# Patient Record
Sex: FEMALE | Race: BLACK OR AFRICAN AMERICAN | NOT HISPANIC OR LATINO | URBAN - METROPOLITAN AREA
[De-identification: names, ages, dates, MRNs, and addresses within clinical notes are randomized per-mention and may not be internally consistent; named-entity substitution may affect disease eponyms.]

---

## 2023-08-16 ENCOUNTER — EMERGENCY (EMERGENCY)
Facility: HOSPITAL | Age: 38
LOS: 0 days | Discharge: HOME | End: 2023-08-16
Attending: STUDENT IN AN ORGANIZED HEALTH CARE EDUCATION/TRAINING PROGRAM
Payer: COMMERCIAL

## 2023-08-16 VITALS
RESPIRATION RATE: 19 BRPM | SYSTOLIC BLOOD PRESSURE: 132 MMHG | DIASTOLIC BLOOD PRESSURE: 68 MMHG | HEART RATE: 55 BPM | TEMPERATURE: 98 F | OXYGEN SATURATION: 100 %

## 2023-08-16 VITALS
SYSTOLIC BLOOD PRESSURE: 136 MMHG | DIASTOLIC BLOOD PRESSURE: 76 MMHG | WEIGHT: 130.07 LBS | RESPIRATION RATE: 17 BRPM | OXYGEN SATURATION: 100 % | HEART RATE: 97 BPM | HEIGHT: 63 IN | TEMPERATURE: 100 F

## 2023-08-16 DIAGNOSIS — R10.9 UNSPECIFIED ABDOMINAL PAIN: ICD-10-CM

## 2023-08-16 DIAGNOSIS — F17.200 NICOTINE DEPENDENCE, UNSPECIFIED, UNCOMPLICATED: ICD-10-CM

## 2023-08-16 DIAGNOSIS — F32.A DEPRESSION, UNSPECIFIED: ICD-10-CM

## 2023-08-16 DIAGNOSIS — M54.9 DORSALGIA, UNSPECIFIED: ICD-10-CM

## 2023-08-16 DIAGNOSIS — R42 DIZZINESS AND GIDDINESS: ICD-10-CM

## 2023-08-16 DIAGNOSIS — R23.2 FLUSHING: ICD-10-CM

## 2023-08-16 DIAGNOSIS — R94.31 ABNORMAL ELECTROCARDIOGRAM [ECG] [EKG]: ICD-10-CM

## 2023-08-16 DIAGNOSIS — Z20.822 CONTACT WITH AND (SUSPECTED) EXPOSURE TO COVID-19: ICD-10-CM

## 2023-08-16 DIAGNOSIS — F29 UNSPECIFIED PSYCHOSIS NOT DUE TO A SUBSTANCE OR KNOWN PHYSIOLOGICAL CONDITION: ICD-10-CM

## 2023-08-16 LAB
ALBUMIN SERPL ELPH-MCNC: 3.5 G/DL — SIGNIFICANT CHANGE UP (ref 3.3–5)
ALP SERPL-CCNC: 57 U/L — SIGNIFICANT CHANGE UP (ref 40–120)
ALT FLD-CCNC: 25 U/L — SIGNIFICANT CHANGE UP (ref 12–78)
AMPHET UR-MCNC: NEGATIVE — SIGNIFICANT CHANGE UP
ANION GAP SERPL CALC-SCNC: 5 MMOL/L — SIGNIFICANT CHANGE UP (ref 5–17)
APAP SERPL-MCNC: <2 UG/ML — LOW (ref 10–30)
APPEARANCE UR: CLEAR — SIGNIFICANT CHANGE UP
AST SERPL-CCNC: 26 U/L — SIGNIFICANT CHANGE UP (ref 15–37)
BARBITURATES UR SCN-MCNC: NEGATIVE — SIGNIFICANT CHANGE UP
BASOPHILS # BLD AUTO: 0.05 K/UL — SIGNIFICANT CHANGE UP (ref 0–0.2)
BASOPHILS NFR BLD AUTO: 0.7 % — SIGNIFICANT CHANGE UP (ref 0–2)
BENZODIAZ UR-MCNC: NEGATIVE — SIGNIFICANT CHANGE UP
BILIRUB SERPL-MCNC: 0.5 MG/DL — SIGNIFICANT CHANGE UP (ref 0.2–1.2)
BILIRUB UR-MCNC: NEGATIVE — SIGNIFICANT CHANGE UP
BUN SERPL-MCNC: 7 MG/DL — SIGNIFICANT CHANGE UP (ref 7–23)
CALCIUM SERPL-MCNC: 8.3 MG/DL — LOW (ref 8.5–10.1)
CHLORIDE SERPL-SCNC: 109 MMOL/L — HIGH (ref 96–108)
CO2 SERPL-SCNC: 27 MMOL/L — SIGNIFICANT CHANGE UP (ref 22–31)
COCAINE METAB.OTHER UR-MCNC: NEGATIVE — SIGNIFICANT CHANGE UP
COLOR SPEC: YELLOW — SIGNIFICANT CHANGE UP
CREAT SERPL-MCNC: 0.71 MG/DL — SIGNIFICANT CHANGE UP (ref 0.5–1.3)
DIFF PNL FLD: NEGATIVE — SIGNIFICANT CHANGE UP
EGFR: 110 ML/MIN/1.73M2 — SIGNIFICANT CHANGE UP
EOSINOPHIL # BLD AUTO: 0.06 K/UL — SIGNIFICANT CHANGE UP (ref 0–0.5)
EOSINOPHIL NFR BLD AUTO: 0.9 % — SIGNIFICANT CHANGE UP (ref 0–6)
ETHANOL SERPL-MCNC: <10 MG/DL — SIGNIFICANT CHANGE UP (ref 0–10)
FLUAV AG NPH QL: SIGNIFICANT CHANGE UP
FLUBV AG NPH QL: SIGNIFICANT CHANGE UP
GLUCOSE SERPL-MCNC: 84 MG/DL — SIGNIFICANT CHANGE UP (ref 70–99)
GLUCOSE UR QL: NEGATIVE MG/DL — SIGNIFICANT CHANGE UP
HCG SERPL-ACNC: <1 MIU/ML — SIGNIFICANT CHANGE UP
HCT VFR BLD CALC: 27.6 % — LOW (ref 34.5–45)
HGB BLD-MCNC: 7.8 G/DL — LOW (ref 11.5–15.5)
IMM GRANULOCYTES NFR BLD AUTO: 0.3 % — SIGNIFICANT CHANGE UP (ref 0–0.9)
KETONES UR-MCNC: NEGATIVE — SIGNIFICANT CHANGE UP
LEUKOCYTE ESTERASE UR-ACNC: NEGATIVE — SIGNIFICANT CHANGE UP
LYMPHOCYTES # BLD AUTO: 1.64 K/UL — SIGNIFICANT CHANGE UP (ref 1–3.3)
LYMPHOCYTES # BLD AUTO: 23.9 % — SIGNIFICANT CHANGE UP (ref 13–44)
MCHC RBC-ENTMCNC: 17.2 PG — LOW (ref 27–34)
MCHC RBC-ENTMCNC: 28.3 G/DL — LOW (ref 32–36)
MCV RBC AUTO: 60.9 FL — LOW (ref 80–100)
METHADONE UR-MCNC: NEGATIVE — SIGNIFICANT CHANGE UP
MONOCYTES # BLD AUTO: 0.56 K/UL — SIGNIFICANT CHANGE UP (ref 0–0.9)
MONOCYTES NFR BLD AUTO: 8.2 % — SIGNIFICANT CHANGE UP (ref 2–14)
NEUTROPHILS # BLD AUTO: 4.52 K/UL — SIGNIFICANT CHANGE UP (ref 1.8–7.4)
NEUTROPHILS NFR BLD AUTO: 66 % — SIGNIFICANT CHANGE UP (ref 43–77)
NITRITE UR-MCNC: NEGATIVE — SIGNIFICANT CHANGE UP
NRBC # BLD: 0 /100 WBCS — SIGNIFICANT CHANGE UP (ref 0–0)
OPIATES UR-MCNC: NEGATIVE — SIGNIFICANT CHANGE UP
PCP SPEC-MCNC: SIGNIFICANT CHANGE UP
PCP UR-MCNC: NEGATIVE — SIGNIFICANT CHANGE UP
PH UR: 6 — SIGNIFICANT CHANGE UP (ref 5–8)
PLATELET # BLD AUTO: 242 K/UL — SIGNIFICANT CHANGE UP (ref 150–400)
POTASSIUM SERPL-MCNC: 3.8 MMOL/L — SIGNIFICANT CHANGE UP (ref 3.5–5.3)
POTASSIUM SERPL-SCNC: 3.8 MMOL/L — SIGNIFICANT CHANGE UP (ref 3.5–5.3)
PROT SERPL-MCNC: 8 GM/DL — SIGNIFICANT CHANGE UP (ref 6–8.3)
PROT UR-MCNC: NEGATIVE MG/DL — SIGNIFICANT CHANGE UP
RBC # BLD: 4.53 M/UL — SIGNIFICANT CHANGE UP (ref 3.8–5.2)
RBC # FLD: 21.1 % — HIGH (ref 10.3–14.5)
SALICYLATES SERPL-MCNC: <1.7 MG/DL — LOW (ref 2.8–20)
SARS-COV-2 RNA SPEC QL NAA+PROBE: SIGNIFICANT CHANGE UP
SODIUM SERPL-SCNC: 141 MMOL/L — SIGNIFICANT CHANGE UP (ref 135–145)
SP GR SPEC: 1.01 — SIGNIFICANT CHANGE UP (ref 1.01–1.02)
THC UR QL: NEGATIVE — SIGNIFICANT CHANGE UP
TSH SERPL-MCNC: 0.02 UIU/ML — LOW (ref 0.36–3.74)
UROBILINOGEN FLD QL: NEGATIVE MG/DL — SIGNIFICANT CHANGE UP
WBC # BLD: 6.85 K/UL — SIGNIFICANT CHANGE UP (ref 3.8–10.5)
WBC # FLD AUTO: 6.85 K/UL — SIGNIFICANT CHANGE UP (ref 3.8–10.5)

## 2023-08-16 PROCEDURE — 99285 EMERGENCY DEPT VISIT HI MDM: CPT

## 2023-08-16 PROCEDURE — 93010 ELECTROCARDIOGRAM REPORT: CPT

## 2023-08-16 PROCEDURE — 90792 PSYCH DIAG EVAL W/MED SRVCS: CPT | Mod: 95,GC

## 2023-08-16 NOTE — ED ADULT NURSE NOTE - NSHOSCREENINGQ1_ED_ALL_ED
[FreeTextEntry1] : Patient is a 28 year old female who presents with sinus pressure, sinus pain, nasal congestion, and foul odor in her left nare. Physical examination wnl, except green PND is seen \par Nasal endoscopy significant for left middle turbinate erythema, no other abnormalities but clinical history is highly consistent with chronic sinusitis, possible allergic fungal. \par \par Plan\par - rx for CT no contrast-- will call with results\par - d/c Ciprofloxacin\par - c/w [facility] sinus rinse with only saline\par - f/u TBD on CT scan results, possible abx/steroids etc vs surgery  No

## 2023-08-16 NOTE — ED PROVIDER NOTE - OBJECTIVE STATEMENT
38F w/o significant PMH presents for lightheadedness & emotional distress. Pt is in police custody. Pt tearful. Pt endorses occasional flushed sensation at night, occasional abd pain, occasional back pain. Denies F/C, h/a, vision change, CP, SOB, cough, N/V/D, UTI sx. Pt denies SI or HI, but state she feels very depressed, pt states she will often have sex to make herself feel less alone, pt states she is w/o friends or family, & has no place to live. Denies HI. Pt denies AH / VH, but states she will often stare at things that fascinate her.     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

## 2023-08-16 NOTE — ED PROVIDER NOTE - NSFOLLOWUPINSTRUCTIONS_ED_ALL_ED_FT
Rest, drink plenty of fluids  Advance activity as tolerated  Continue all previously prescribed medications as directed  Follow up with your PMD - bring copies of your results  Return to the ER for thoughts of self harm or harming others, hallucinations, chest pain, difficulty breathing, or other new or concerning symptoms   Follow up with your doctor for abnormal red blood cells and thyroid function

## 2023-08-16 NOTE — ED BEHAVIORAL HEALTH ASSESSMENT NOTE - LEGAL HISTORY
States has been in custodial once prior for "throwing away clothes" States has been in nursing home once prior for "throwing away clothes", currently under arrest and has open MD charge for [place] Mis:intent Damage Proprty per Lesleys

## 2023-08-16 NOTE — ED BEHAVIORAL HEALTH ASSESSMENT NOTE - NSBHMSEINSIGHT_PSY_A_CORE
Bactrim Counseling:  I discussed with the patient the risks of sulfa antibiotics including but not limited to GI upset, allergic reaction, drug rash, diarrhea, dizziness, photosensitivity, and yeast infections.  Rarely, more serious reactions can occur including but not limited to aplastic anemia, agranulocytosis, methemoglobinemia, blood dyscrasias, liver or kidney failure, lung infiltrates or desquamative/blistering drug rashes. Poor Fair

## 2023-08-16 NOTE — ED BEHAVIORAL HEALTH ASSESSMENT NOTE - NSCURPASTPSYDX_PSY_ALL_CORE
Psychotic disorder/Alcohol/Substance Use disorders mild paranoia/Mood disorder/Cluster B Personality disorder/traits

## 2023-08-16 NOTE — ED ADULT NURSE NOTE - NSFALLUNIVINTERV_ED_ALL_ED
Bed/Stretcher in lowest position, wheels locked, appropriate side rails in place/Call bell, personal items and telephone in reach/Instruct patient to call for assistance before getting out of bed/chair/stretcher/Non-slip footwear applied when patient is off stretcher/[place] to call system/Physically safe environment - no spills, clutter or unnecessary equipment/Purposeful proactive rounding/Room/bathroom lighting operational, light cord in reach

## 2023-08-16 NOTE — ED BEHAVIORAL HEALTH ASSESSMENT NOTE - DETAILS
unknown if true States recently at [facility] ED Aware see hpi Patient denies Unavailable No SI referent aware

## 2023-08-16 NOTE — ED ADULT TRIAGE NOTE - CHIEF COMPLAINT QUOTE
Patient under Police custody, came with a shackle an hand cuffs, came here for medical and psyche eval. patient reports she's feeling lightheadedness and also she needs emotional help.  denies any SI/HI

## 2023-08-16 NOTE — ED BEHAVIORAL HEALTH ASSESSMENT NOTE - SUMMARY
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    On eval patient calm, cooperative, linear but concrete TP, vague historian but TC + for possible paranoia, not appearing overtly intoxication, no IP/RIS. Patient stating she does not know why she is under arrest, denies any recent violence, any access to firearm. Denying AVH, any SI/HI. States she has chronic history of anxiety, sense of being followed in community and being in danger. States past psych care at [facility], but no MD or meds in many months. Vague about why she has been hospitalized in the past. At this time, DDx primary psychotic do, less likely substance induced psychosis given negative Utox. Patient denying SI/HI, denying AVH, with demonstrated ability to care for self at minimum level in community, not meeting criteria for involuntary psychiatric admission at this time. Will d/c to [facility] police custody, will provide referrals for local shelters and outpatient psychiatric resources. 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    On evaluation patient is not acutely manic, depressed or psychotic. She has some paranoid ideation, but she is not preoccupied by these thoughts. She reported she has some anxiety, and was evasive at times, but she impressed as psychiatrically stable. While in the ED she was calm without evidence of aggression or self harm behavior. Her thought process was largely linear. She denied history of suicide attempts. She denied suicidal ideation, intent, and plan. She denied homicidal ideation, intent, and plan and she denied access to weapons. She denies auditory/visual hallucinations and she was not responsive to internal stimuli. Pt would benefit from therapy and outpatient psychiatric treatment given reports of anxiety, history of psychiatric illness and psychosocial stressors; however she is not an acute risk of harm to self or others due to psychiatric illness and she does not meet legal criteria for involuntary hospitalization on a psychiatric unit at this time. Pt is agreeable to receive outpatient resources as she expressed that she wants to reestablish care at [facility]. Resources were provided to patient for shelter, [facility] crisis center, and [facility] outpatient services.

## 2023-08-16 NOTE — ED PROVIDER NOTE - CLINICAL SUMMARY MEDICAL DECISION MAKING FREE TEXT BOX
38F BIB police d/t lightheadedness, request for Psych evaluation. Afebrile, VSS. Pt tearful, but in NAD. Unremarkable PE. Pt w/o family or friend for collateral. Plan for medical clearance, psych evaluation. 38F BIB police d/t lightheadedness, request for Psych evaluation. Afebrile, VSS. Pt tearful, but in NAD. Unremarkable PE. Pt w/o family or friend for collateral. Plan for medical clearance, psych evaluation. Pt care signed out at change of shift, pending Psych recommendations.

## 2023-08-16 NOTE — ED BEHAVIORAL HEALTH ASSESSMENT NOTE - HPI (INCLUDE ILLNESS QUALITY, SEVERITY, DURATION, TIMING, CONTEXT, MODIFYING FACTORS, ASSOCIATED SIGNS AND SYMPTOMS)
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    On eval, patient calm, cooperative in NAD, linear but concrete TP, not appearing intoxicated or IP/RIS, vague historian. Stating she does not known why she is under arrest, that she was in her house when police came in, does not know who called 911. Denies having access to firearm. States she has been "anxious" in past weeks, mainly about sensation about people following her. Vague when asked who these people are, states she "knows their names" but won't say who they are, does not known why they are following her "I just want to be left alone". Denies trying to find and harm or kill them. Denies any SI. Denies AVH or manic sx. States she "used to drink", but states recently 1-2 beers infrequently, denies other substance use. States she has not seen psychiatrist "in awhile", does not know names of medications she used to take. States past care at [facility] "upstairs and in the clinic", that she knows how to access services there. Vague when asked why she has been in the hospital in the past, just referencing her anxiety. Requesting medication for anxiety and insomnia. States she is currently "between places" and receives food stamps, also panhandles for money. States she has family in area, but unable to provide any numbers for collateral. 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    Pt seen and chart reviewed. On evaluation, patient calm, cooperative in NAD, linear but concrete TP. She is not responsive to internal stimuli.  She is cooperative, but she is a vague historian. Stating she does not known why she is under arrest, that she was in someone's house getting her stuff when police came in, but she does not know who called 911. She denies having access to firearm. States she has been "anxious" in past weeks, mainly because she feels people are  following her. Vague when asked who these people are, states she "knows their names" but won't say who they are, does not known why they are following her "I just want to be left alone". Denies homicidal, violent, ideation intent or plan. She denies history of suicide attempts. She denies suicidal ideation, intent, and plan.  Denies AVH or manic sx. States she "used to drink", but states recently 1-2 beers infrequently, denies other substance use. States she has not seen psychiatrist "in awhile", does not know names of medications she used to take. States past care at [facility] "upstairs and in the clinic", that she knows how to access services there. Vague when asked why she has been in the hospital in the past, just referencing her anxiety. She stated that she was agreeable to restart outpatient treatment for medication for sleep and anxiety. Regarding where she lives, she states she is currently "between places" and receives food stamps, also panhandles for money. States she has family in area, but unable to provide any numbers for collateral.

## 2023-08-16 NOTE — ED ADULT NURSE NOTE - OBJECTIVE STATEMENT
Patient under Police custody,  came here for medical and psyche eval. patient reports she's feeling lightheadedness and also she needs emotional help, stating I need a new man and needs to have sex before she dies and feels as though she is dying every day. As per officer pt noted passing out while in custody and acting strange. Pt poor historian stating that sometimes she has allergies to meds and sometimes I don't. 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 denies any SI/HI

## 2023-08-16 NOTE — ED BEHAVIORAL HEALTH ASSESSMENT NOTE - OTHER PAST PSYCHIATRIC HISTORY (INCLUDE DETAILS REGARDING ONSET, COURSE OF ILLNESS, INPATIENT/OUTPATIENT TREATMENT)
Patient vague regarding PPH. Numerous diagnoses per psyckes (Unspecifed/Other Psychotic Disorders | Adjustment Disorder | Schizophrenia | PTSD | Unspecifed/Other Depressive Disorder | Delusional   Disorder | Other Mental Disorders). Per psyckes prescribed paliperidone/clonazepam >1yr ago. Numerous 1 day CPEP, last hospitalization listed for 6 days 3/2022 at [facility].

## 2023-08-16 NOTE — ED BEHAVIORAL HEALTH ASSESSMENT NOTE - RISK ASSESSMENT
Patient denying SI/HI, denying AVH, with demonstrated ability to care for self at minimum level in community, not meeting criteria for involuntary psychiatric admission at this time. Will d/c to [facility] police custody, will provide referrals for local shelters and outpatient psychiatric resources. Pt is at low imminent risk of harm to self or others at this time. She has chronic and some acute risk factors including past IPP, arrests, mild paranoia, currently being under arrest, history of psychiatric illness, not currently in treatment, reporting anxiety. However, patient has many protective factors that mitigate her risk factors. She is help seeking, she denies history of suicide attempts, she denies SI/HI, she is not acutely manic, depressed, or psychotic, she is caring for her ADLS, she is future oriented, she expressed interest in outpatient treatment, and she is currently in police custody which also decreases her risk of imminent violence/self harm as she is in a supervised setting.

## 2023-08-16 NOTE — ED PROVIDER NOTE - IV ALTEPLASE EXCL REL HIDDEN
Seen and examined agree with the above resident note, dxed with resident in details. See additional info as it applies.    Recently seen in the hospital presented to establish outpatient care  She is a group D unknown grade COPD we don't have PFTs on file  Diagnosed about 10 years ago   tobacco user quit right before her hospitalization about 20 days ago    She has close to normal CAT scan of the chest last week, normal coronary angiography, normal 2-D echocardiography with no signs of pulmonary hypertension  She is on triple inhalation therapy and doing well  She did not need supplemental oxygen    Continue inhalation management  Enroll in pulmonary rehabilitation  She has several sleep symptoms from insomnia to excessive daytime sleepiness to snoring, we would proceed with a comprehensive polysomnography  Need baseline pulmonary function study  Smoking counseling  Up-to-date on vaccination  See me after the testing  
show

## 2023-08-16 NOTE — ED BEHAVIORAL HEALTH ASSESSMENT NOTE - DIFFERENTIAL
Primary psychotic do, less likely substance induced psychosis axis 2 pathology, generalized anxiety disorder, primary psychotic disorder, adjustment disorder, primary mood disorder

## 2023-08-16 NOTE — ED BEHAVIORAL HEALTH NOTE - BEHAVIORAL HEALTH NOTE
===================     PRE-HOSPITAL COURSE     ===================     SOURCE: Secondhand documentation and ED RN Gail     DETAILS: Pt BIBEMS under police custody with chief complaint of lightheadedness and depression, seeking psychiatric evaluation.    ============     ED COURSE     ============     SOURCE:  Secondhand documentation and ED RN Gail    ARRIVAL MODE: ED RN reports that pt has been cooperative with all triage processes. Per ED RN, pt's hygiene and grooming are fair.     BELONGINGS: Nothing of note was discovered.     BEHAVIOR: ED RN newly had the pt and did not have too much to report but notes that pt has been calm and cooperative and has remained under behavioral and impulse control. Per ED RN, pt demonstrates a depressed mood with a tearful affect. Pt is currently denying SI/HI but endorses high-risk sexual behavior. Pt arrived in police custody but ED RN has not learned more about the reason for her arrest at this time.     TREATMENT: Pt has not required treatment thus far.     VISITORS: No visitors at bedside.

## 2023-08-16 NOTE — ED BEHAVIORAL HEALTH ASSESSMENT NOTE - REFERRAL / APPOINTMENT DETAILS
Provide outpatient information for clinic at [facility] Pt provided with resources for outpatient tx at [facility], shelter information, and crisis clinic information

## 2023-08-16 NOTE — ED PROVIDER NOTE - CARE PROVIDER_API CALL
Grayson Neville  Internal Medicine  [address] [place] [id number]  Phone: [phone]  Fax: [phone]  Follow Up Time:

## 2023-08-16 NOTE — ED BEHAVIORAL HEALTH ASSESSMENT NOTE - NSBHATTESTBILLING_PSY_A_CORE
99222-Initial OBS or IP - moderate complexity OR 55-74 mins 10532-[id number] diagnostic evaluation with medical services

## 2023-08-16 NOTE — ED BEHAVIORAL HEALTH ASSESSMENT NOTE - DESCRIPTION
On initial assessment HY 97, Temp 99.7, HgB 7.8. TSH .023, Utox wnl. 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 Patient cooperative in ED, no PRN needed for behavior. 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 States she takes ASA QD. States currently undomiciled, receives foodstamps Pt under arrest though calm and in good behavioral control     T(C): 36.9 (08-16-23 @ 23:49)  T(F): 98.4 (08-16-23 @ 23:49), Max: 99.7 (08-16-23 @ 13:27)  HR: 55 (08-16-23 @ 23:49) (55 - 97)  BP: 132/68 (08-16-23 @ 23:49) (124/69 - 145/74)  RR:  (16 - 19)  SpO2:  (97% - 100%)  Wt(kg): -- States currently unmichele receives SNAP benefits

## 2023-08-16 NOTE — ED ADULT NURSE REASSESSMENT NOTE - NS ED NURSE REASSESS COMMENT FT1
Pt resting in bed, PD still at bedside. PD restraints are in place, sites assessed. Pt has palpable pulses, full sensation in extremities. Telepsych consult completed, awaiting further orders for dispo. Plan of care ongoing.

## 2023-08-16 NOTE — ED PROVIDER NOTE - PROGRESS NOTE DETAILS
Pt presented to Tele Psych. Tucker: Cleared by telepsych.  Informed patient of anemia and abnormal thyroid studies.  No bleeding now.  Well appearing now.  Instructed to follow up with pmd on release.

## 2023-08-16 NOTE — ED PROVIDER NOTE - PATIENT PORTAL LINK FT
You can access the FollowMyHealth Patient Portal offered by [facility] by registering at the following website: [email or web address]. By joining [facility]’s FollowMyHealth portal, you will also be able to view your health information using other applications (apps) compatible with our system.

## 2023-08-16 NOTE — ED PROVIDER NOTE - PHYSICAL EXAMINATION
GEN: Awake, alert, interactive, tearful / anxious.   HEAD AND NECK: NC/AT. Airway patent. Neck supple.   EYES:  Clear b/l. EOMI. PERRL.   ENT: Moist mucus membranes.   CARDIAC: Regular rate, regular rhythm. No evident pedal edema.    RESP/CHEST: Normal respiratory effort with no use of accessory muscles or retractions. Clear throughout on auscultation.  ABD: Soft, non-distended, non-tender. No rebound, no guarding.   BACK: No midline spinal TTP. No CVAT.   EXTREMITIES: Moving all extremities with no apparent deformities.   SKIN: Warm, dry, intact normal color. No rash.   NEURO: AOx3, CN II-XII grossly intact, no focal deficits.   PSYCH: Tearful, anxious. Pt w/ tangential replies to questions.

## 2023-08-17 DIAGNOSIS — F43.20 ADJUSTMENT DISORDER, UNSPECIFIED: ICD-10-CM

## 2023-08-17 LAB
T3 SERPL-MCNC: 152 NG/DL — SIGNIFICANT CHANGE UP (ref 80–200)
T4 AB SER-ACNC: 9.2 UG/DL — SIGNIFICANT CHANGE UP (ref 4.6–12)
T4 FREE SERPL-MCNC: 1.9 NG/DL — HIGH (ref 0.9–1.8)

## 2024-01-31 NOTE — ED BEHAVIORAL HEALTH ASSESSMENT NOTE - NS ED BHA SUICIDALITY PRESENT CURRENT PASSIVE IDEATION
----- Message from Isatu Ceja sent at 1/31/2024  2:37 PM CST -----  Contact: KEON URIARTE [[id number]]  ..Type:  Patient Requesting Call    Who Called: KEON URIARTE [[id number]]  Does the patient know what this is regarding?: questions regarding A1C retest  Would the patient rather a call back or a response via [facility]? call  Best Call Back Number: .[phone] (home)   Additional Information:            No

## 2024-04-03 NOTE — ED ADULT NURSE NOTE - AS PAIN REST
[FreeTextEntry8] : New patient Acute care visit Has 6-8 bug bites over body after working in the backyard.  Areas are itchy, red, flat. She did not see any ticks. Her bedroom is connected to a greenhouse and she has house cats.  Denies fever, chills, body aches, rash 0 (no pain/absence of nonverbal indicators of pain)

## 2024-07-02 ENCOUNTER — INPATIENT (INPATIENT)
Facility: HOSPITAL | Age: 39
LOS: 9 days | Discharge: PSYCHIATRIC OR SUBSTANCE USE FACILITY | End: 2024-07-12
Attending: HOSPITALIST | Admitting: HOSPITALIST
Payer: MEDICAID

## 2024-07-02 VITALS
WEIGHT: 177.91 LBS | TEMPERATURE: 98 F | RESPIRATION RATE: 16 BRPM | SYSTOLIC BLOOD PRESSURE: 107 MMHG | HEART RATE: 70 BPM | DIASTOLIC BLOOD PRESSURE: 61 MMHG | HEIGHT: 65 IN | OXYGEN SATURATION: 100 %

## 2024-07-02 DIAGNOSIS — F43.10 POST-TRAUMATIC STRESS DISORDER, UNSPECIFIED: ICD-10-CM

## 2024-07-02 DIAGNOSIS — F32.A DEPRESSION, UNSPECIFIED: ICD-10-CM

## 2024-07-02 DIAGNOSIS — Z79.899 OTHER LONG TERM (CURRENT) DRUG THERAPY: ICD-10-CM

## 2024-07-02 DIAGNOSIS — R79.89 OTHER SPECIFIED ABNORMAL FINDINGS OF BLOOD CHEMISTRY: ICD-10-CM

## 2024-07-02 LAB
ADD ON TEST-SPECIMEN IN LAB: SIGNIFICANT CHANGE UP
ALBUMIN SERPL ELPH-MCNC: 3.7 G/DL — SIGNIFICANT CHANGE UP (ref 3.3–5)
ALP SERPL-CCNC: 91 U/L — SIGNIFICANT CHANGE UP (ref 40–120)
ALT FLD-CCNC: 22 U/L — SIGNIFICANT CHANGE UP (ref 4–33)
ANION GAP SERPL CALC-SCNC: 12 MMOL/L — SIGNIFICANT CHANGE UP (ref 7–14)
ANISOCYTOSIS BLD QL: SIGNIFICANT CHANGE UP
APAP SERPL-MCNC: <10 UG/ML — LOW (ref 15–25)
AST SERPL-CCNC: 30 U/L — SIGNIFICANT CHANGE UP (ref 4–32)
BASOPHILS # BLD AUTO: 0 K/UL — SIGNIFICANT CHANGE UP (ref 0–0.2)
BASOPHILS NFR BLD AUTO: 0 % — SIGNIFICANT CHANGE UP (ref 0–2)
BILIRUB SERPL-MCNC: 0.6 MG/DL — SIGNIFICANT CHANGE UP (ref 0.2–1.2)
BUN SERPL-MCNC: 7 MG/DL — SIGNIFICANT CHANGE UP (ref 7–23)
CALCIUM SERPL-MCNC: 8.9 MG/DL — SIGNIFICANT CHANGE UP (ref 8.4–10.5)
CHLORIDE SERPL-SCNC: 104 MMOL/L — SIGNIFICANT CHANGE UP (ref 98–107)
CO2 SERPL-SCNC: 19 MMOL/L — LOW (ref 22–31)
CREAT SERPL-MCNC: 0.7 MG/DL — SIGNIFICANT CHANGE UP (ref 0.5–1.3)
DACRYOCYTES BLD QL SMEAR: SLIGHT — SIGNIFICANT CHANGE UP
EGFR: 111 ML/MIN/1.73M2 — SIGNIFICANT CHANGE UP
EOSINOPHIL # BLD AUTO: 0 K/UL — SIGNIFICANT CHANGE UP (ref 0–0.5)
EOSINOPHIL NFR BLD AUTO: 0 % — SIGNIFICANT CHANGE UP (ref 0–6)
ETHANOL SERPL-MCNC: <10 MG/DL — SIGNIFICANT CHANGE UP
GIANT PLATELETS BLD QL SMEAR: PRESENT — SIGNIFICANT CHANGE UP
GLUCOSE SERPL-MCNC: 89 MG/DL — SIGNIFICANT CHANGE UP (ref 70–99)
HCG SERPL-ACNC: <1 MIU/ML — SIGNIFICANT CHANGE UP
HCT VFR BLD CALC: 30 % — LOW (ref 34.5–45)
HGB BLD-MCNC: 8.6 G/DL — LOW (ref 11.5–15.5)
HYPOCHROMIA BLD QL: SIGNIFICANT CHANGE UP
IANC: 7.12 K/UL — SIGNIFICANT CHANGE UP (ref 1.8–7.4)
LYMPHOCYTES # BLD AUTO: 0.82 K/UL — LOW (ref 1–3.3)
LYMPHOCYTES # BLD AUTO: 8.8 % — LOW (ref 13–44)
MANUAL SMEAR VERIFICATION: SIGNIFICANT CHANGE UP
MCHC RBC-ENTMCNC: 16.7 PG — LOW (ref 27–34)
MCHC RBC-ENTMCNC: 28.7 GM/DL — LOW (ref 32–36)
MCV RBC AUTO: 58.1 FL — LOW (ref 80–100)
MICROCYTES BLD QL: SLIGHT — SIGNIFICANT CHANGE UP
MONOCYTES # BLD AUTO: 0.33 K/UL — SIGNIFICANT CHANGE UP (ref 0–0.9)
MONOCYTES NFR BLD AUTO: 3.5 % — SIGNIFICANT CHANGE UP (ref 2–14)
NEUTROPHILS # BLD AUTO: 7.87 K/UL — HIGH (ref 1.8–7.4)
NEUTROPHILS NFR BLD AUTO: 84.2 % — HIGH (ref 43–77)
OVALOCYTES BLD QL SMEAR: SLIGHT — SIGNIFICANT CHANGE UP
PLAT MORPH BLD: NORMAL — SIGNIFICANT CHANGE UP
PLATELET # BLD AUTO: 186 K/UL — SIGNIFICANT CHANGE UP (ref 150–400)
PLATELET COUNT - ESTIMATE: NORMAL — SIGNIFICANT CHANGE UP
POIKILOCYTOSIS BLD QL AUTO: SLIGHT — SIGNIFICANT CHANGE UP
POLYCHROMASIA BLD QL SMEAR: SLIGHT — SIGNIFICANT CHANGE UP
POTASSIUM SERPL-MCNC: 4 MMOL/L — SIGNIFICANT CHANGE UP (ref 3.5–5.3)
POTASSIUM SERPL-SCNC: 4 MMOL/L — SIGNIFICANT CHANGE UP (ref 3.5–5.3)
PROT SERPL-MCNC: 7.5 G/DL — SIGNIFICANT CHANGE UP (ref 6–8.3)
RBC # BLD: 5.16 M/UL — SIGNIFICANT CHANGE UP (ref 3.8–5.2)
RBC # FLD: 22.4 % — HIGH (ref 10.3–14.5)
RBC BLD AUTO: ABNORMAL
SALICYLATES SERPL-MCNC: <0.3 MG/DL — LOW (ref 15–30)
SARS-COV-2 RNA SPEC QL NAA+PROBE: SIGNIFICANT CHANGE UP
SMUDGE CELLS # BLD: PRESENT — SIGNIFICANT CHANGE UP
SODIUM SERPL-SCNC: 135 MMOL/L — SIGNIFICANT CHANGE UP (ref 135–145)
TOXICOLOGY SCREEN, DRUGS OF ABUSE, SERUM RESULT: SIGNIFICANT CHANGE UP
TSH SERPL-MCNC: <0.1 UIU/ML — LOW (ref 0.27–4.2)
VARIANT LYMPHS # BLD: 3.5 % — SIGNIFICANT CHANGE UP (ref 0–6)
WBC # BLD: 9.35 K/UL — SIGNIFICANT CHANGE UP (ref 3.8–10.5)
WBC # FLD AUTO: 9.35 K/UL — SIGNIFICANT CHANGE UP (ref 3.8–10.5)

## 2024-07-02 RX ORDER — NICOTINE POLACRILEX 2 MG/1
1 LOZENGE ORAL DAILY
Refills: 0 | Status: DISCONTINUED | OUTPATIENT
Start: 2024-07-02 | End: 2024-07-12

## 2024-07-02 RX ORDER — RISPERIDONE 0.5 MG/1
1 TABLET ORAL ONCE
Refills: 0 | Status: COMPLETED | OUTPATIENT
Start: 2024-07-02 | End: 2024-07-02

## 2024-07-02 RX ORDER — ACETAMINOPHEN 325 MG
650 TABLET ORAL EVERY 6 HOURS
Refills: 0 | Status: DISCONTINUED | OUTPATIENT
Start: 2024-07-02 | End: 2024-07-12

## 2024-07-02 RX ORDER — ONDANSETRON HYDROCHLORIDE 2 MG/ML
4 INJECTION INTRAMUSCULAR; INTRAVENOUS EVERY 8 HOURS
Refills: 0 | Status: DISCONTINUED | OUTPATIENT
Start: 2024-07-02 | End: 2024-07-12

## 2024-07-02 RX ORDER — ACETAMINOPHEN 325 MG
650 TABLET ORAL ONCE
Refills: 0 | Status: COMPLETED | OUTPATIENT
Start: 2024-07-02 | End: 2024-07-02

## 2024-07-02 RX ORDER — ASPIRIN 325 MG/1
1 TABLET, FILM COATED ORAL
Refills: 0 | DISCHARGE

## 2024-07-02 RX ORDER — MAGNESIUM, ALUMINUM HYDROXIDE 400-400
30 TABLET,CHEWABLE ORAL EVERY 4 HOURS
Refills: 0 | Status: DISCONTINUED | OUTPATIENT
Start: 2024-07-02 | End: 2024-07-12

## 2024-07-02 RX ORDER — ASPIRIN 325 MG/1
81 TABLET, FILM COATED ORAL DAILY
Refills: 0 | Status: DISCONTINUED | OUTPATIENT
Start: 2024-07-02 | End: 2024-07-12

## 2024-07-02 RX ORDER — ENOXAPARIN SODIUM 100 MG/ML
40 INJECTION SUBCUTANEOUS EVERY 24 HOURS
Refills: 0 | Status: DISCONTINUED | OUTPATIENT
Start: 2024-07-03 | End: 2024-07-12

## 2024-07-02 RX ADMIN — RISPERIDONE 1 MILLIGRAM(S): 0.5 TABLET ORAL at 07:25

## 2024-07-02 RX ADMIN — NICOTINE POLACRILEX 1 PATCH: 2 LOZENGE ORAL at 23:08
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
## 2024-07-02 NOTE — PATIENT PROFILE ADULT - FALL HARM RISK - UNIVERSAL INTERVENTIONS
Bed in lowest position, wheels locked, appropriate side rails in place/Call bell, personal items and telephone in reach/Instruct patient to call for assistance before getting out of bed or chair/Non-slip footwear when patient is out of bed/[place] to call system/Physically safe environment - no spills, clutter or unnecessary equipment/Purposeful Proactive Rounding/Room/bathroom lighting operational, light cord in reach

## 2024-07-02 NOTE — ED BEHAVIORAL HEALTH ASSESSMENT NOTE - DESCRIPTION
n/a lives with 2 kids, currently  but does not live with , Calm, cooperative, tired and falling asleep occasionally but arousable. Given Risperdal 1mg po qhs for voices and bc she was stable on invega from 2022.   Vital Signs Last 24 Hrs  T(C): 36.6 (02 Jul 2024 04:33), Max: 36.6 (02 Jul 2024 04:33)  T(F): 97.9 (02 Jul 2024 04:33), Max: 97.9 (02 Jul 2024 04:33)  HR: 70 (02 Jul 2024 04:33) (70 - 70)  BP: 107/61 (02 Jul 2024 04:33) (107/61 - 107/61)  BP(mean): --  RR: 16 (02 Jul 2024 04:33) (16 - 16)  SpO2: 100% (02 Jul 2024 04:33) (100% - 100%)    Parameters below as of 02 Jul 2024 04:33  Patient On (Oxygen Delivery Method): room air

## 2024-07-02 NOTE — ED ADULT TRIAGE NOTE - CHIEF COMPLAINT QUOTE
Pt arrives to ED under arrest with [employer].  pt c/o anxiety and depression.  Pt has history of anxiety and depression.  Pt denies S/I, H/I.  Pt denies hallucinations but feels like she is being "stalked."  Pt denies ETOH or drug use.
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
## 2024-07-02 NOTE — ED BEHAVIORAL HEALTH ASSESSMENT NOTE - NS ED BHA ED COURSE PSYCHIATRIC MEDICATION GIVEN
Discussed results with patient. Patient states understanding. They had no futher questions at this time.   Oral Medication (indication, name, dose, time)

## 2024-07-02 NOTE — ED BEHAVIORAL HEALTH ASSESSMENT NOTE - NS ED BHA PLAN MSU SUBSTANCE ISSUES2 FT
CIWA / COWS not indicated at this time; psychoeducation and brief MI provided re destabilizing effects of cannabis
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
## 2024-07-02 NOTE — ED PROVIDER NOTE - NSICDXNOPASTSURGICALHX_GEN_ALL_ED
[facility] PHYSICAL THERAPY  [facility] [address], [address], [place], [address] [facility] - Phone: [phone]  Fax: [phone]  PROGRESS NOTE  Patient Name: Manpreet Cruz : 1993   Treatment/Medical Diagnosis: Lumbar spine pain [M54.5]   Referral Source: Zahira Zacarias MD     Date of Initial Visit: 12/15 Attended Visits: 10 Missed Visits: 3     SUMMARY OF TREATMENT  PT has consisted of initial evaluation, therapeutic exercises, HEP, manual therapy, patient education, and modalities. CURRENT STATUS  Pt currently reports 7/10 max pain. States his back feels very weak and wants to give out on him. Current FOTO = 54, GROC = +1. Pt has been provided HEP, demonstrates (I) with exercises, however does not report compliance. Minimal change in symptoms to date. Previous Goals:  1. Increase score on FOTO by > or = 22 to demo an increase in functional activity tolerance. 2. Pt will note < or = 2/10 pain with all mobility to improve comfort with ADLs. 3. Pt will demonstrates GROC score of >/= 4+ to allow increase in functional activity tolerance    Prior Level/Current Level:  1) Prior Level: 41   Current Level: 54   Goal Met? progressing  2) Prior Level: 7/10 max    Current Level: 7/10 max    Goal Met? no  3) Prior Level: na    Current Level: +1    Goal Met? progressing    New Goals to be achieved in __4__  weeks:  1. Increase score on FOTO by > or = 22 to demo an increase in functional activity tolerance. 2. Pt will note < or = 2/10 pain with all mobility to improve comfort with ADLs. 3. Pt will demonstrates GROC score of >/= 4+ to allow increase in functional activity tolerance   RECOMMENDATIONS  Pt making minimal progress toward goals to date. Recommend continuing PT for up to an additional 4 weeks, d/c to HEP if no additional progress is made at that time.   Thank you for this referral.   If you have any questions/comments please contact us directly at [phone]. Thank you for allowing us to assist in the care of your patient. Therapist Signature: Jesika Cedeño, PT, OCS, SCS, CSCS Date: 2/15/2017     Time: 12:25 PM   NOTE TO PHYSICIAN:  PLEASE COMPLETE THE ORDERS BELOW AND FAX TO   [facility]: [phone]  If you are unable to process this request in 24 hours please contact our office: [phone]    ___ I have read the above report and request that my patient continue as recommended.   ___ I have read the above report and request that my patient continue therapy with the following changes/special instructions:_________________________________________________________   ___ I have read the above report and request that my patient be discharged from therapy.      Physician Signature:        Date:       Time: <-- Click to add NO significant Past Surgical History
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
## 2024-07-02 NOTE — ED BEHAVIORAL HEALTH ASSESSMENT NOTE - NS ED BHA PLAN MSU PSYCHIATRIC ISSUES2 FT
defer to primary team patient as pt appears to be presenting more w/ depressive / PTSD rather than psychotic sx ; needs further clarification; see above for agitation recommendations

## 2024-07-02 NOTE — H&P ADULT - PROBLEM SELECTOR PLAN 1
Chronic uncontrolled as presenting with the above  Pt states she is on 2 antipsychotic medications and ASA 81mg daily but does not take any of her medications regularly    consulted: no 1:1 indicated at this time. For agitation please attempt verbal de-escalation and behavioral intervention first. If patient does not respond to above, may give haldol 5 mg po q6h prn, ativan 2 mg po q6h prn if no contraindications. If patient is refusing PO, remains an imminent danger to self or others and If Patient's  qtc <500, can escalate to IM formulation. - orders will be placed once EKG obtained

## 2024-07-02 NOTE — H&P ADULT - NSHPPHYSICALEXAM_GEN_ALL_CORE
PHYSICAL EXAM:  GENERAL: NAD, comfortable at bedside   HEAD:  Atraumatic, Normocephalic  EYES: EOMI, PERRL, conjunctiva and sclera clear  NECK: Supple, No JVD  CHEST/LUNG: Clear to auscultation bilaterally; No wheezes, rales or rhonchi  HEART: Regular rate and rhythm; + murmurs, no rubs, or gallops, (+)S1, S2  ABDOMEN: Soft, Nontender, Nondistended; Normal Bowel sounds   EXTREMITIES:  2+ Peripheral Pulses, No clubbing, cyanosis, or edema  PSYCH: denies SI/HI, endorses intermittent auditory hallucinations   NEUROLOGY: AAOx3, moving all extremities spontaneously   SKIN: No rashes or lesions

## 2024-07-02 NOTE — ED PROVIDER NOTE - PROGRESS NOTE DETAILS
DO Alexys, ED attending: Patient signed out to me pending a.m. reassessment by psychiatry received respite all overnight as she was stating that she may have had some hallucinations.  Patient complaining of some pain in her ribs, pending x-ray to eval for possible rib fractures.  Will order some Tylenol,  RN made aware.

## 2024-07-02 NOTE — ED PROVIDER NOTE - CARE PLAN
1 Principal Discharge DX:	PTSD (post-traumatic stress disorder)  Secondary Diagnosis:	Depression, unspecified depression type
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
## 2024-07-02 NOTE — ED BEHAVIORAL HEALTH ASSESSMENT NOTE - NSBHMSEAFFQUAL_PSY_A_CORE
Anticoagulation Clinic Progress Note    Anticoagulation Summary  As of 2021    INR goal:  2.0-3.0   TTR:  91.4 % (2.8 y)   INR used for dosin.30 (2021)   Warfarin maintenance plan:  4 mg every Tue, Thu; 2 mg all other days   Weekly warfarin total:  18 mg   No change documented:  Eulalia Reaves RPH   Plan last modified:  Eulalia Reaves RPH (2021)   Next INR check:  10/5/2021   Priority:  Maintenance   Target end date:      Indications    Atrial fibrillation with RVR (CMS/HCC) (Resolved) [I48.91]             Anticoagulation Episode Summary     INR check location:      Preferred lab:      Send INR reminders to:  [facility]  POOL    Comments:  [facility]   ([facility] clinic)      Anticoagulation Care Providers     Provider Role Specialty Phone number    Sly Saleh MD Referring Cardiology [phone]          Clinic Interview:  No pertinent clinical findings have been reported.    INR History:  Anticoagulation Monitoring 2021   INR 3.17 2.38 2.30   INR Date 2021   INR Goal 2.0-3.0 2.0-3.0 2.0-3.0   Trend Down Same Same   Last Week Total 20 mg 18 mg 18 mg   Next Week Total 18 mg 18 mg 18 mg   Sun 2 mg 2 mg 2 mg   Mon 2 mg 2 mg 2 mg   Tue 4 mg 4 mg 4 mg   Wed 2 mg 2 mg 2 mg   Thu 4 mg 4 mg 4 mg   Fri 2 mg 2 mg 2 mg   Sat 2 mg 2 mg 2 mg   Visit Report - - -   Some recent data might be hidden       Plan:  1. INR is therapeutic today- see above in Anticoagulation Summary.    Arelis FLORES Alex to continue their warfarin regimen- see above in Anticoagulation Summary.  2. Follow up in 2 weeks  3. They have been instructed to call if any changes in medications, doses, concerns, etc. Patient expresses understanding and has no further questions at this time.    Eulalia Reaves RPH   Depressed/Anxious
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
## 2024-07-02 NOTE — ED PROVIDER NOTE - PHYSICAL EXAMINATION
Const: Well-nourished, Well-developed, appearing stated age.  Eyes:  symmetrical lids.  HEENT: Head NCAT, no lesions. Atraumatic external nose and ears.   Neck: Symmetric, trachea midline.   CVS: +S1/S2, Radial and DP pulses 2+ b/l.   RESP: Unlabored respiratory effort.  GI: Nontender/Nondistended.   MSK: Normocephalic/Atraumatic.   Skin: Warm, dry and intact.   Neuro: Fluent Speech. Moving all extremities.   Psych: Awake, Alert, & Oriented (AAO) x3. +guarded affect.

## 2024-07-02 NOTE — H&P ADULT - NSHPREVIEWOFSYSTEMS_GEN_ALL_CORE
REVIEW OF SYSTEMS:    CONSTITUTIONAL: No weakness, fevers or chills  EYES/ENT: No visual changes;  No dysphagia; No sore throat; No rhinorrhea; No sinus pain/pressure  NECK: No pain or stiffness  RESPIRATORY: No cough, wheezing, hemoptysis; No shortness of breath  CARDIOVASCULAR: No chest pain or palpitations; No lower extremity edema  GASTROINTESTINAL: No abdominal or epigastric pain. No nausea, vomiting, or hematemesis; No diarrhea or constipation. No melena or hematochezia.  GENITOURINARY: No dysuria, frequency or hematuria  NEUROLOGICAL: No numbness or weakness  PSYCH: + auditory hallucinations,   MSK: ambulates without assistance + left axillary rib pain   SKIN: No itching, burning, rashes, or lesions   All other review of systems is negative unless indicated above.
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
## 2024-07-02 NOTE — H&P ADULT - NSHPLABSRESULTS_GEN_ALL_CORE
8.6    9.35  )-----------( 186      ( 02 Jul 2024 06:13 )             30.0     135  |  104  |  7   ----------------------------<  89     07-02  4.0   |  19<L>  |  0.70    Ca    8.9      02 Jul 2024 06:13    TPro  7.5  /  Alb  3.7  /  TBili  0.6  /  DBili  x   /  AST  30  /  ALT  22  /  AlkPhos  91  07-02    CXR interpreted by radiology: The lungs are clear.  The heart is borderline enlarged but there are no effusions or congestion to suggest CHF.  No pneumothorax.

## 2024-07-02 NOTE — ED BEHAVIORAL HEALTH ASSESSMENT NOTE - OTHER PAST PSYCHIATRIC HISTORY (INCLUDE DETAILS REGARDING ONSET, COURSE OF ILLNESS, INPATIENT/OUTPATIENT TREATMENT)
As per sarah - pt with multiple CPEP visits to [facility] - last one being 09/19/22  Had been in tx in their clinic in 2022
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
## 2024-07-03 ENCOUNTER — TRANSCRIPTION ENCOUNTER (OUTPATIENT)
Age: 39
End: 2024-07-03

## 2024-07-03 DIAGNOSIS — R94.6 ABNORMAL RESULTS OF THYROID FUNCTION STUDIES: ICD-10-CM

## 2024-07-03 DIAGNOSIS — F29 UNSPECIFIED PSYCHOSIS NOT DUE TO A SUBSTANCE OR KNOWN PHYSIOLOGICAL CONDITION: ICD-10-CM

## 2024-07-03 DIAGNOSIS — R63.4 ABNORMAL WEIGHT LOSS: ICD-10-CM

## 2024-07-03 DIAGNOSIS — E05.90 THYROTOXICOSIS, UNSPECIFIED WITHOUT THYROTOXIC CRISIS OR STORM: ICD-10-CM

## 2024-07-03 LAB
A1C WITH ESTIMATED AVERAGE GLUCOSE RESULT: 5 % — SIGNIFICANT CHANGE UP (ref 4–5.6)
ADD ON TEST-SPECIMEN IN LAB: SIGNIFICANT CHANGE UP
ANION GAP SERPL CALC-SCNC: 12 MMOL/L — SIGNIFICANT CHANGE UP (ref 7–14)
APPEARANCE UR: CLEAR — SIGNIFICANT CHANGE UP
BACTERIA # UR AUTO: NEGATIVE /HPF — SIGNIFICANT CHANGE UP
BASOPHILS # BLD AUTO: 0.05 K/UL — SIGNIFICANT CHANGE UP (ref 0–0.2)
BASOPHILS NFR BLD AUTO: 1 % — SIGNIFICANT CHANGE UP (ref 0–2)
BILIRUB UR-MCNC: NEGATIVE — SIGNIFICANT CHANGE UP
BUN SERPL-MCNC: 15 MG/DL — SIGNIFICANT CHANGE UP (ref 7–23)
CALCIUM SERPL-MCNC: 8.9 MG/DL — SIGNIFICANT CHANGE UP (ref 8.4–10.5)
CAST: 0 /LPF — SIGNIFICANT CHANGE UP (ref 0–4)
CHLORIDE SERPL-SCNC: 108 MMOL/L — HIGH (ref 98–107)
CHOLEST SERPL-MCNC: 113 MG/DL — SIGNIFICANT CHANGE UP
CO2 SERPL-SCNC: 20 MMOL/L — LOW (ref 22–31)
COLOR SPEC: YELLOW — SIGNIFICANT CHANGE UP
CREAT SERPL-MCNC: 0.62 MG/DL — SIGNIFICANT CHANGE UP (ref 0.5–1.3)
DIFF PNL FLD: NEGATIVE — SIGNIFICANT CHANGE UP
EGFR: 115 ML/MIN/1.73M2 — SIGNIFICANT CHANGE UP
EOSINOPHIL # BLD AUTO: 0.07 K/UL — SIGNIFICANT CHANGE UP (ref 0–0.5)
EOSINOPHIL NFR BLD AUTO: 1.4 % — SIGNIFICANT CHANGE UP (ref 0–6)
ESTIMATED AVERAGE GLUCOSE: 97 — SIGNIFICANT CHANGE UP
FERRITIN SERPL-MCNC: 10 NG/ML — LOW (ref 15–150)
FOLATE SERPL-MCNC: 8.5 NG/ML — SIGNIFICANT CHANGE UP (ref 3.1–17.5)
GLUCOSE SERPL-MCNC: 90 MG/DL — SIGNIFICANT CHANGE UP (ref 70–99)
GLUCOSE UR QL: NEGATIVE MG/DL — SIGNIFICANT CHANGE UP
HCT VFR BLD CALC: 31.2 % — LOW (ref 34.5–45)
HDLC SERPL-MCNC: 51 MG/DL — SIGNIFICANT CHANGE UP
HGB BLD-MCNC: 8.7 G/DL — LOW (ref 11.5–15.5)
IANC: 2.31 K/UL — SIGNIFICANT CHANGE UP (ref 1.8–7.4)
IMM GRANULOCYTES NFR BLD AUTO: 0.2 % — SIGNIFICANT CHANGE UP (ref 0–0.9)
IRON SATN MFR SERPL: 21 UG/DL — LOW (ref 30–160)
IRON SATN MFR SERPL: 7 % — LOW (ref 14–50)
KETONES UR-MCNC: NEGATIVE MG/DL — SIGNIFICANT CHANGE UP
LEUKOCYTE ESTERASE UR-ACNC: NEGATIVE — SIGNIFICANT CHANGE UP
LIPID PNL WITH DIRECT LDL SERPL: 54 MG/DL — SIGNIFICANT CHANGE UP
LYMPHOCYTES # BLD AUTO: 2.05 K/UL — SIGNIFICANT CHANGE UP (ref 1–3.3)
LYMPHOCYTES # BLD AUTO: 41.2 % — SIGNIFICANT CHANGE UP (ref 13–44)
MAGNESIUM SERPL-MCNC: 1.9 MG/DL — SIGNIFICANT CHANGE UP (ref 1.6–2.6)
MCHC RBC-ENTMCNC: 16.4 PG — LOW (ref 27–34)
MCHC RBC-ENTMCNC: 27.9 GM/DL — LOW (ref 32–36)
MCV RBC AUTO: 59 FL — LOW (ref 80–100)
MONOCYTES # BLD AUTO: 0.49 K/UL — SIGNIFICANT CHANGE UP (ref 0–0.9)
MONOCYTES NFR BLD AUTO: 9.8 % — SIGNIFICANT CHANGE UP (ref 2–14)
NEUTROPHILS # BLD AUTO: 2.31 K/UL — SIGNIFICANT CHANGE UP (ref 1.8–7.4)
NEUTROPHILS NFR BLD AUTO: 46.4 % — SIGNIFICANT CHANGE UP (ref 43–77)
NITRITE UR-MCNC: NEGATIVE — SIGNIFICANT CHANGE UP
NON HDL CHOLESTEROL: 62 MG/DL — SIGNIFICANT CHANGE UP
NRBC # BLD: 0 /100 WBCS — SIGNIFICANT CHANGE UP (ref 0–0)
NRBC # FLD: 0 K/UL — SIGNIFICANT CHANGE UP (ref 0–0)
PH UR: 6.5 — SIGNIFICANT CHANGE UP (ref 5–8)
PHOSPHATE SERPL-MCNC: 4 MG/DL — SIGNIFICANT CHANGE UP (ref 2.5–4.5)
PLATELET # BLD AUTO: 173 K/UL — SIGNIFICANT CHANGE UP (ref 150–400)
POTASSIUM SERPL-MCNC: 4.1 MMOL/L — SIGNIFICANT CHANGE UP (ref 3.5–5.3)
POTASSIUM SERPL-SCNC: 4.1 MMOL/L — SIGNIFICANT CHANGE UP (ref 3.5–5.3)
PROT UR-MCNC: NEGATIVE MG/DL — SIGNIFICANT CHANGE UP
RBC # BLD: 5.29 M/UL — HIGH (ref 3.8–5.2)
RBC # FLD: 22.1 % — HIGH (ref 10.3–14.5)
RBC CASTS # UR COMP ASSIST: 0 /HPF — SIGNIFICANT CHANGE UP (ref 0–4)
SODIUM SERPL-SCNC: 140 MMOL/L — SIGNIFICANT CHANGE UP (ref 135–145)
SP GR SPEC: 1.01 — SIGNIFICANT CHANGE UP (ref 1–1.03)
SQUAMOUS # UR AUTO: 1 /HPF — SIGNIFICANT CHANGE UP (ref 0–5)
T4 FREE SERPL-MCNC: 2.2 NG/DL — HIGH (ref 0.9–1.8)
TIBC SERPL-MCNC: 315 UG/DL — SIGNIFICANT CHANGE UP (ref 220–430)
TRIGL SERPL-MCNC: 40 MG/DL — SIGNIFICANT CHANGE UP
TSH SERPL-MCNC: <0.1 UIU/ML — LOW (ref 0.27–4.2)
UIBC SERPL-MCNC: 294 UG/DL — SIGNIFICANT CHANGE UP (ref 110–370)
UROBILINOGEN FLD QL: 0.2 MG/DL — SIGNIFICANT CHANGE UP (ref 0.2–1)
VIT B12 SERPL-MCNC: 404 PG/ML — SIGNIFICANT CHANGE UP (ref 200–900)
WBC # BLD: 4.98 K/UL — SIGNIFICANT CHANGE UP (ref 3.8–10.5)
WBC # FLD AUTO: 4.98 K/UL — SIGNIFICANT CHANGE UP (ref 3.8–10.5)
WBC UR QL: 0 /HPF — SIGNIFICANT CHANGE UP (ref 0–5)

## 2024-07-03 RX ORDER — OLANZAPINE 2.5 MG/1
5 TABLET, FILM COATED ORAL
Refills: 0 | Status: DISCONTINUED | OUTPATIENT
Start: 2024-07-03 | End: 2024-07-09

## 2024-07-03 RX ORDER — FERROUS SULFATE 325(65) MG
325 TABLET ORAL DAILY
Refills: 0 | Status: DISCONTINUED | OUTPATIENT
Start: 2024-07-03 | End: 2024-07-12

## 2024-07-03 RX ORDER — OLANZAPINE 2.5 MG/1
2.5 TABLET, FILM COATED ORAL EVERY 6 HOURS
Refills: 0 | Status: DISCONTINUED | OUTPATIENT
Start: 2024-07-03 | End: 2024-07-12

## 2024-07-03 RX ADMIN — Medication 325 MILLIGRAM(S): at 12:41

## 2024-07-03 RX ADMIN — NICOTINE POLACRILEX 1 PATCH: 2 LOZENGE ORAL at 07:21

## 2024-07-03 RX ADMIN — OLANZAPINE 5 MILLIGRAM(S): 2.5 TABLET, FILM COATED ORAL at 20:00

## 2024-07-03 RX ADMIN — ASPIRIN 81 MILLIGRAM(S): 325 TABLET, FILM COATED ORAL at 12:40

## 2024-07-03 RX ADMIN — NICOTINE POLACRILEX 1 PATCH: 2 LOZENGE ORAL at 23:15

## 2024-07-03 RX ADMIN — NICOTINE POLACRILEX 1 PATCH: 2 LOZENGE ORAL at 17:21

## 2024-07-03 RX ADMIN — NICOTINE POLACRILEX 1 PATCH: 2 LOZENGE ORAL at 12:39

## 2024-07-03 NOTE — PROGRESS NOTE ADULT - PROBLEM SELECTOR PLAN 1
Chronic uncontrolled as presenting with the above  Pt states she is on 2 antipsychotic medications and ASA 81mg daily but does not take any of her medications regularly    consulted: no 1:1 indicated at this time. For agitation please attempt verbal de-escalation and behavioral intervention first. If patient does not respond to above, may give haldol 5 mg po q6h prn, ativan 2 mg po q6h prn if no contraindications. If patient is refusing PO, remains an imminent danger to self or others and If Patient's  qtc <500, can escalate to IM formulation.

## 2024-07-03 NOTE — DISCHARGE NOTE PROVIDER - NSFOLLOWUPCLINICS_GEN_ALL_ED_FT
[facility]  Endocrinology  [address] [place] [address]  Phone: [phone]  Fax: [phone]

## 2024-07-03 NOTE — DISCHARGE NOTE PROVIDER - DETAILS OF MALNUTRITION DIAGNOSIS/DIAGNOSES
This patient has been assessed with a concern for Malnutrition and was treated during this hospitalization for the following Nutrition diagnosis/diagnoses:     -  07/09/2024: Moderate protein-calorie malnutrition
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
## 2024-07-03 NOTE — DISCHARGE NOTE PROVIDER - NSDCCPTREATMENT_GEN_ALL_CORE_FT
PRINCIPAL PROCEDURE  Procedure: Thyroid US  Findings and Treatment: Right Lobe: 5.0 cm x 1.8 cm x 1.8 cm. Mild heterogeneous parenchyma.  Left Lobe: 4.2 cm x 1.4 cm x 2.0 cm. Mild heterogeneous parenchyma.  A lower pole isoechoic mixed cystic and solid nodule with smooth margin   without calcification measuring 1.0 cm (TR 2).  Isthmus: 0.5 cm.  Cervical Lymph Nodes: No enlarged or abnormal morphology cervical nodes.  IMPRESSION:  Mild heterogeneous thyroid parenchyma. No hyperemia.  A left thyroid TR 2 nodule.  TI-RAD 2: Not suspicious (No FNA)  __________________________________  ACR Thyroid Imaging, Reporting and Data System (TI-RADS): White Paper of   the ACR TI-RADS Committee. J Am An Radiol 2017;14:587-595.  TI-RAD 1: Benign (No FNA)  TI-RAD 2: Not suspicious (No FNA)  TI-RAD 3: Mildly suspicious (FNA if > 2.5 cm, Follow if >1.5 cm)  TI-RAD 4: Moderately suspicious (FNA if > 1.5 cm, Follow if > 1 cm)  TI-RAD 5: Highly suspicious (FNA if > 1 cm, Follow if > 0.5 cm)< from: US Thyroid (07.03.24 @ 15:26) >
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
## 2024-07-03 NOTE — PHARMACOTHERAPY INTERVENTION NOTE - COMMENTS
Medication history is incomplete. Unable to verify patient's medication list. Please call spectra [phone] if you have any questions.

## 2024-07-03 NOTE — DISCHARGE NOTE PROVIDER - CARE PROVIDER_API CALL
Khoa Lopez  Interventional Cardiology  [address] [place] [id number]  Phone: [phone]  Fax: [phone]  Follow Up Time:
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
## 2024-07-03 NOTE — PROGRESS NOTE ADULT - SUBJECTIVE AND OBJECTIVE BOX
Rogerio Howard MD  Division of Hospitalist Medicine  Pager [phone]  Available on Teams    CC: Patient is a 39y old  Female who presents with a chief complaint of MDD, low TSH (02 Jul 2024 21:19)        SUBJECTIVE / INTERVAL HPI: Patient seen and examined at bedside. Pt grossly denies any new complaints.     ROS: negative unless otherwise stated above.    VITAL SIGNS:  Vital Signs Last 24 Hrs  T(C): 36.8 (03 Jul 2024 05:00), Max: 37.1 (02 Jul 2024 12:32)  T(F): 98.2 (03 Jul 2024 05:00), Max: 98.8 (02 Jul 2024 12:32)  HR: 69 (03 Jul 2024 05:00) (63 - 72)  BP: 122/70 (03 Jul 2024 05:00) (106/62 - 122/70)  BP(mean): --  RR: 20 (03 Jul 2024 05:00) (17 - 20)  SpO2: 98% (03 Jul 2024 05:00) (95% - 100%)    Parameters below as of 03 Jul 2024 05:00  Patient On (Oxygen Delivery Method): room air            PHYSICAL EXAM:    General: NAD  HEENT: MMM  Neck: supple  Cardiovascular: +S1/S2; RRR  Respiratory: CTA B/L; no W/R/R  Gastrointestinal: soft, NT/ND  Extremities: WWP; no edema, clubbing or cyanosis  Neurological: awake and alert; communicating and able to follow commands without appreciated focal neurologic deficits    MEDICATIONS:  MEDICATIONS  (STANDING):  aspirin enteric coated 81 milliGRAM(s) Oral daily  enoxaparin Injectable 40 milliGRAM(s) SubCutaneous every 24 hours  ferrous    sulfate 325 milliGRAM(s) Oral daily  nicotine -   7 mG/24Hr(s) Patch 1 Patch Transdermal daily    MEDICATIONS  (PRN):  acetaminophen     Tablet .. 650 milliGRAM(s) Oral every 6 hours PRN Temp greater or equal to 38C (100.4F), Mild Pain (1 - 3)  aluminum hydroxide/magnesium hydroxide/simethicone Suspension 30 milliLiter(s) Oral every 4 hours PRN Dyspepsia  melatonin 3 milliGRAM(s) Oral at bedtime PRN Insomnia  ondansetron Injectable 4 milliGRAM(s) IV Push every 8 hours PRN Nausea and/or Vomiting      ALLERGIES:  Allergies    No Known Allergies    Intolerances        LABS:                        8.7    4.98  )-----------( 173      ( 03 Jul 2024 06:45 )             31.2     07-03    140  |  108<H>  |  15  ----------------------------<  90  4.1   |  20<L>  |  0.62    Ca    8.9      03 Jul 2024 06:45    TPro  7.5  /  Alb  3.7  /  TBili  0.6  /  DBili  x   /  AST  30  /  ALT  22  /  AlkPhos  91  07-02      Urinalysis Basic - ( 03 Jul 2024 06:45 )    Color: x / Appearance: x / SG: x / pH: x  Gluc: 90 mg/dL / Ketone: x  / Bili: x / Urobili: x   Blood: x / Protein: x / Nitrite: x   Leuk Esterase: x / RBC: x / WBC x   Sq Epi: x / Non Sq Epi: x / Bacteria: x      CAPILLARY BLOOD GLUCOSE          RADIOLOGY & ADDITIONAL TESTS: Reviewed.

## 2024-07-03 NOTE — DISCHARGE NOTE PROVIDER - NSDCMRMEDTOKEN_GEN_ALL_CORE_FT
aspirin 81 mg oral capsule: 1 cap(s) orally once a day   acetaminophen 325 mg oral tablet: 2 tab(s) orally every 6 hours As needed Temp greater or equal to 38C (100.4F), Mild Pain (1 - 3)  aspirin 81 mg oral capsule: 1 cap(s) orally once a day  ferrous sulfate 325 mg (65 mg elemental iron) oral tablet: 1 tab(s) orally once a day  melatonin 3 mg oral tablet: 1 tab(s) orally once a day (at bedtime) As needed Insomnia  methIMAzole 5 mg oral tablet: 1 tab(s) orally once a day  Multiple Vitamins oral tablet: 1 tab(s) orally once a day  nicotine 7 mg/24 hr transdermal film, extended release: transdermal once a day  OLANZapine 10 mg oral tablet: 1 tab(s) orally once a day (at bedtime) at 2000

## 2024-07-03 NOTE — DISCHARGE NOTE PROVIDER - NSDCCPCAREPLAN_GEN_ALL_CORE_FT
PRINCIPAL DISCHARGE DIAGNOSIS  Diagnosis: Psychotic disorder  Assessment and Plan of Treatment: You are being transferred to [facility] for further psychiatric care.      SECONDARY DISCHARGE DIAGNOSES  Diagnosis: Graves disease  Assessment and Plan of Treatment: TSH <.1, TSH receptor antibody 2.03 elevated, thyroid stimulating immunoglobulin (TSI) 1.01 elevated  Thyroid US showed heterogenous echotexture and left lower pole 1cm solid/cystic nodule.  This is indicative of Graves disease diagnosis.  Started on methimazole.        PRINCIPAL DISCHARGE DIAGNOSIS  Diagnosis: Psychotic disorder  Assessment and Plan of Treatment: You are being transferred to [facility] for further psychiatric care.      SECONDARY DISCHARGE DIAGNOSES  Diagnosis: Graves disease  Assessment and Plan of Treatment: TSH <.1, TSH receptor antibody 2.03 elevated, thyroid stimulating immunoglobulin (TSI) 1.01 elevated  Thyroid US showed heterogenous echotexture and left lower pole 1cm solid/cystic nodule.  This is indicative of Graves disease diagnosis.  Started on methimazole.       Diagnosis: Aortic stenosis  Assessment and Plan of Treatment: Echo shows moderate to severe AS. Avoid hypotension. Patient to follow up with Cardiology as an outpatient.  Patient reports history of heart surgery at 7 years old at [facility] for VSD repair.     PRINCIPAL DISCHARGE DIAGNOSIS  Diagnosis: Psychotic disorder  Assessment and Plan of Treatment: You are being transferred to [facility] for further psychiatric care.      SECONDARY DISCHARGE DIAGNOSES  Diagnosis: Graves disease  Assessment and Plan of Treatment: TSH <.1, TSH receptor antibody 2.03 elevated, thyroid stimulating immunoglobulin (TSI) 1.01 elevated  Thyroid US showed heterogenous echotexture and left lower pole 1cm solid/cystic nodule.  This is indicative of Graves disease diagnosis.  Started on methimazole.   Repeat TSH <.1, FT4 1.8, Total T3 145, Endo recommends continue current dose methimazole, ***repeat TSH/FT4/Total T3 in 4 weeks***      Diagnosis: Aortic stenosis  Assessment and Plan of Treatment: Echo shows moderate to severe AS. Avoid hypotension. Patient to follow up with Cardiology as an outpatient.  Patient reports history of heart surgery at 7 years old at [facility] for VSD repair.     PRINCIPAL DISCHARGE DIAGNOSIS  Diagnosis: Psychotic disorder  Assessment and Plan of Treatment: You are being transferred to [facility] for further psychiatric care.      SECONDARY DISCHARGE DIAGNOSES  Diagnosis: Graves disease  Assessment and Plan of Treatment: TSH <.1, TSH receptor antibody 2.03 elevated, thyroid stimulating immunoglobulin (TSI) 1.01 elevated  Thyroid US showed heterogenous echotexture and left lower pole 1cm solid/cystic nodule.  This is indicative of Graves disease diagnosis.  Started on methimazole.   Repeat TSH <.1, FT4 1.8, Total T3 145, Endo recommends continue current dose methimazole, ***repeat TSH/FT4/Total T3 in 4 weeks***      Diagnosis: Aortic stenosis  Assessment and Plan of Treatment: Echo shows moderate to severe AS. Avoid hypotension. Patient to follow up with Cardiology as an outpatient.  Patient reports history of heart surgery at 7 years old at [facility] for VSD repair. Continue with ASA as long as can tolerate.    Diagnosis: Iron deficiency anemia  Assessment and Plan of Treatment: Ferritin low at 10. Started on oral iron.   Follow up as outpatient.

## 2024-07-03 NOTE — CONSULT NOTE ADULT - ATTENDING COMMENTS
Dheeraj Ordonez was seen and treated in our emergency department on 8/30/2023. Diagnosis:     Zain Hernandez  may return to work on return date, may return to school on return date. He may return on this date: 08/31/2023         If you have any questions or concerns, please don't hesitate to call.       Liv Figueroa, DO    ______________________________           _______________          _______________  Hospital Representative                              Date                                Time Please note, pt attempted to be seen twice by me, off the unit.  unable to be seen  plan d/w fellow as outlined above  await  thyroid US and labs to help determine next steps in management and treatment   monitor HR

## 2024-07-04 LAB
24R-OH-CALCIDIOL SERPL-MCNC: 24.6 NG/ML — LOW (ref 30–80)
ANION GAP SERPL CALC-SCNC: 13 MMOL/L — SIGNIFICANT CHANGE UP (ref 7–14)
BASOPHILS # BLD AUTO: 0.04 K/UL — SIGNIFICANT CHANGE UP (ref 0–0.2)
BASOPHILS NFR BLD AUTO: 0.7 % — SIGNIFICANT CHANGE UP (ref 0–2)
BUN SERPL-MCNC: 11 MG/DL — SIGNIFICANT CHANGE UP (ref 7–23)
CALCIUM SERPL-MCNC: 9 MG/DL — SIGNIFICANT CHANGE UP (ref 8.4–10.5)
CHLORIDE SERPL-SCNC: 107 MMOL/L — SIGNIFICANT CHANGE UP (ref 98–107)
CO2 SERPL-SCNC: 20 MMOL/L — LOW (ref 22–31)
CREAT SERPL-MCNC: 0.55 MG/DL — SIGNIFICANT CHANGE UP (ref 0.5–1.3)
EGFR: 118 ML/MIN/1.73M2 — SIGNIFICANT CHANGE UP
EOSINOPHIL # BLD AUTO: 0.08 K/UL — SIGNIFICANT CHANGE UP (ref 0–0.5)
EOSINOPHIL NFR BLD AUTO: 1.5 % — SIGNIFICANT CHANGE UP (ref 0–6)
GLUCOSE SERPL-MCNC: 90 MG/DL — SIGNIFICANT CHANGE UP (ref 70–99)
HCT VFR BLD CALC: 30.6 % — LOW (ref 34.5–45)
HGB BLD-MCNC: 8.9 G/DL — LOW (ref 11.5–15.5)
IANC: 2.56 K/UL — SIGNIFICANT CHANGE UP (ref 1.8–7.4)
IMM GRANULOCYTES NFR BLD AUTO: 0.2 % — SIGNIFICANT CHANGE UP (ref 0–0.9)
LYMPHOCYTES # BLD AUTO: 2.16 K/UL — SIGNIFICANT CHANGE UP (ref 1–3.3)
LYMPHOCYTES # BLD AUTO: 40.3 % — SIGNIFICANT CHANGE UP (ref 13–44)
MAGNESIUM SERPL-MCNC: 1.7 MG/DL — SIGNIFICANT CHANGE UP (ref 1.6–2.6)
MCHC RBC-ENTMCNC: 16.8 PG — LOW (ref 27–34)
MCHC RBC-ENTMCNC: 29.1 GM/DL — LOW (ref 32–36)
MCV RBC AUTO: 57.6 FL — LOW (ref 80–100)
MONOCYTES # BLD AUTO: 0.51 K/UL — SIGNIFICANT CHANGE UP (ref 0–0.9)
MONOCYTES NFR BLD AUTO: 9.5 % — SIGNIFICANT CHANGE UP (ref 2–14)
NEUTROPHILS # BLD AUTO: 2.56 K/UL — SIGNIFICANT CHANGE UP (ref 1.8–7.4)
NEUTROPHILS NFR BLD AUTO: 47.8 % — SIGNIFICANT CHANGE UP (ref 43–77)
NRBC # BLD: 0 /100 WBCS — SIGNIFICANT CHANGE UP (ref 0–0)
NRBC # FLD: 0 K/UL — SIGNIFICANT CHANGE UP (ref 0–0)
PHOSPHATE SERPL-MCNC: 3.6 MG/DL — SIGNIFICANT CHANGE UP (ref 2.5–4.5)
PLATELET # BLD AUTO: 187 K/UL — SIGNIFICANT CHANGE UP (ref 150–400)
POTASSIUM SERPL-MCNC: 3.7 MMOL/L — SIGNIFICANT CHANGE UP (ref 3.5–5.3)
POTASSIUM SERPL-SCNC: 3.7 MMOL/L — SIGNIFICANT CHANGE UP (ref 3.5–5.3)
RBC # BLD: 5.31 M/UL — HIGH (ref 3.8–5.2)
RBC # FLD: 22.1 % — HIGH (ref 10.3–14.5)
SODIUM SERPL-SCNC: 140 MMOL/L — SIGNIFICANT CHANGE UP (ref 135–145)
TSH RECEP AB FLD-ACNC: 2.03 IU/L — HIGH
WBC # BLD: 5.36 K/UL — SIGNIFICANT CHANGE UP (ref 3.8–10.5)
WBC # FLD AUTO: 5.36 K/UL — SIGNIFICANT CHANGE UP (ref 3.8–10.5)

## 2024-07-04 RX ADMIN — NICOTINE POLACRILEX 1 PATCH: 2 LOZENGE ORAL at 13:49

## 2024-07-04 RX ADMIN — OLANZAPINE 5 MILLIGRAM(S): 2.5 TABLET, FILM COATED ORAL at 20:25

## 2024-07-04 RX ADMIN — NICOTINE POLACRILEX 1 PATCH: 2 LOZENGE ORAL at 11:18

## 2024-07-04 RX ADMIN — NICOTINE POLACRILEX 1 PATCH: 2 LOZENGE ORAL at 09:57

## 2024-07-04 RX ADMIN — NICOTINE POLACRILEX 1 PATCH: 2 LOZENGE ORAL at 19:45

## 2024-07-04 RX ADMIN — ASPIRIN 81 MILLIGRAM(S): 325 TABLET, FILM COATED ORAL at 12:27

## 2024-07-04 RX ADMIN — Medication 325 MILLIGRAM(S): at 12:27

## 2024-07-04 NOTE — BH CONSULTATION LIAISON PROGRESS NOTE - ATTENDING COMMENTS
agree with above, patient not yet cleared for DC but will reassess tomorrow, generally presents as quite stable in this controlled setting. psych will follow

## 2024-07-04 NOTE — PROGRESS NOTE ADULT - SUBJECTIVE AND OBJECTIVE BOX
Patient is a 39y old  Female who presents with a chief complaint of MDD, low TSH (03 Jul 2024 11:58)      SUBJECTIVE / OVERNIGHT EVENTS: no events - nypd at bedside     ROS:  No HA/DZ  No Vision changes   No CP, SOB  No N/V/D  No Edema  No Rash  NO weakness, numbness    MEDICATIONS  (STANDING):  aspirin enteric coated 81 milliGRAM(s) Oral daily  enoxaparin Injectable 40 milliGRAM(s) SubCutaneous every 24 hours  ferrous    sulfate 325 milliGRAM(s) Oral daily  nicotine -   7 mG/24Hr(s) Patch 1 Patch Transdermal daily  OLANZapine 5 milliGRAM(s) Oral <User Schedule>    MEDICATIONS  (PRN):  acetaminophen     Tablet .. 650 milliGRAM(s) Oral every 6 hours PRN Temp greater or equal to 38C (100.4F), Mild Pain (1 - 3)  aluminum hydroxide/magnesium hydroxide/simethicone Suspension 30 milliLiter(s) Oral every 4 hours PRN Dyspepsia  melatonin 3 milliGRAM(s) Oral at bedtime PRN Insomnia  OLANZapine Injectable 2.5 milliGRAM(s) IntraMuscular every 6 hours PRN severe agitation  ondansetron Injectable 4 milliGRAM(s) IV Push every 8 hours PRN Nausea and/or Vomiting      T(C): 36.6 (07-04-24 @ 05:00)  HR: 81 (07-04-24 @ 05:00)  BP: 127/70 (07-04-24 @ 05:00)  RR: 19 (07-04-24 @ 05:00)  SpO2: 100% (07-04-24 @ 05:00)  CAPILLARY BLOOD GLUCOSE        I&O's Summary      PHYSICAL EXAM:  GENERAL: NAD  NECK: Supple, No JVD  CHEST/LUNG: Clear to auscultation bilaterally  HEART: Regular rate and rhythm; No murmurs, rubs, or gallops, No Edema  ABDOMEN: Soft, Nontender, Nondistended; Bowel sounds present  EXTREMITIES:  2+ Peripheral Pulses, No clubbing, cyanosis      LABS:                        8.9    5.36  )-----------( 187      ( 04 Jul 2024 04:45 )             30.6     07-04    140  |  107  |  11  ----------------------------<  90  3.7   |  20<L>  |  0.55    Ca    9.0      04 Jul 2024 04:45  Phos  3.6     07-04  Mg     1.70     07-04            Urinalysis Basic - ( 04 Jul 2024 04:45 )    Color: x / Appearance: x / SG: x / pH: x  Gluc: 90 mg/dL / Ketone: x  / Bili: x / Urobili: x   Blood: x / Protein: x / Nitrite: x   Leuk Esterase: x / RBC: x / WBC x   Sq Epi: x / Non Sq Epi: x / Bacteria: x            RADIOLOGY & ADDITIONAL TESTS:    Imaging Personally Reviewed:    Consultant(s) Notes Reviewed:      Care Discussed with Consultants/Other Providers:

## 2024-07-04 NOTE — PROGRESS NOTE ADULT - PROBLEM SELECTOR PLAN 2
New  TSH <0.1  fT4 2.2  Endocrinology rec joy - thyroid sono w nodule and mild heterogenous appearance  check tsh rec ab  check tsi

## 2024-07-04 NOTE — PROGRESS NOTE ADULT - PROBLEM SELECTOR PLAN 1
Chronic uncontrolled as presenting with the above  Pt states she is on 2 antipsychotic medications and ASA 81mg daily but does not take any of her medications regularly    consulted: zyperxa per BH

## 2024-07-04 NOTE — BH CONSULTATION LIAISON PROGRESS NOTE - NSBHCONSULTFOLLOWAFTERCARE_PSY_A_CORE FT
[facility] (M-F 9am-7pm): [phone]  [phone] [place] [address], [facility], [place] [address]  Offers crisis psychotherapy and short-term medication management should patient have a delay in seeing outpatient psychiatrist or need to be evaluated by a mental health provider. Walk-in appointments available. If patient's family feels that the patient is at high risk of harm to self or others they should take the patient to the ED or call 911. Please provide patient this information upon discharge. [email or web address]
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
## 2024-07-05 LAB
ANION GAP SERPL CALC-SCNC: 12 MMOL/L — SIGNIFICANT CHANGE UP (ref 7–14)
BASOPHILS # BLD AUTO: 0.05 K/UL — SIGNIFICANT CHANGE UP (ref 0–0.2)
BASOPHILS NFR BLD AUTO: 0.9 % — SIGNIFICANT CHANGE UP (ref 0–2)
BUN SERPL-MCNC: 9 MG/DL — SIGNIFICANT CHANGE UP (ref 7–23)
CALCIUM SERPL-MCNC: 9 MG/DL — SIGNIFICANT CHANGE UP (ref 8.4–10.5)
CHLORIDE SERPL-SCNC: 107 MMOL/L — SIGNIFICANT CHANGE UP (ref 98–107)
CO2 SERPL-SCNC: 22 MMOL/L — SIGNIFICANT CHANGE UP (ref 22–31)
CREAT SERPL-MCNC: 0.58 MG/DL — SIGNIFICANT CHANGE UP (ref 0.5–1.3)
EGFR: 117 ML/MIN/1.73M2 — SIGNIFICANT CHANGE UP
EOSINOPHIL # BLD AUTO: 0.08 K/UL — SIGNIFICANT CHANGE UP (ref 0–0.5)
EOSINOPHIL NFR BLD AUTO: 1.5 % — SIGNIFICANT CHANGE UP (ref 0–6)
GLUCOSE SERPL-MCNC: 82 MG/DL — SIGNIFICANT CHANGE UP (ref 70–99)
HCT VFR BLD CALC: 29 % — LOW (ref 34.5–45)
HGB BLD-MCNC: 8.4 G/DL — LOW (ref 11.5–15.5)
IANC: 2.31 K/UL — SIGNIFICANT CHANGE UP (ref 1.8–7.4)
IMM GRANULOCYTES NFR BLD AUTO: 0 % — SIGNIFICANT CHANGE UP (ref 0–0.9)
LYMPHOCYTES # BLD AUTO: 2.22 K/UL — SIGNIFICANT CHANGE UP (ref 1–3.3)
LYMPHOCYTES # BLD AUTO: 41.5 % — SIGNIFICANT CHANGE UP (ref 13–44)
MAGNESIUM SERPL-MCNC: 1.8 MG/DL — SIGNIFICANT CHANGE UP (ref 1.6–2.6)
MCHC RBC-ENTMCNC: 16.7 PG — LOW (ref 27–34)
MCHC RBC-ENTMCNC: 29 GM/DL — LOW (ref 32–36)
MCV RBC AUTO: 57.5 FL — LOW (ref 80–100)
MONOCYTES # BLD AUTO: 0.69 K/UL — SIGNIFICANT CHANGE UP (ref 0–0.9)
MONOCYTES NFR BLD AUTO: 12.9 % — SIGNIFICANT CHANGE UP (ref 2–14)
NEUTROPHILS # BLD AUTO: 2.31 K/UL — SIGNIFICANT CHANGE UP (ref 1.8–7.4)
NEUTROPHILS NFR BLD AUTO: 43.2 % — SIGNIFICANT CHANGE UP (ref 43–77)
NRBC # BLD: 0 /100 WBCS — SIGNIFICANT CHANGE UP (ref 0–0)
NRBC # FLD: 0 K/UL — SIGNIFICANT CHANGE UP (ref 0–0)
PHOSPHATE SERPL-MCNC: 3.6 MG/DL — SIGNIFICANT CHANGE UP (ref 2.5–4.5)
PLATELET # BLD AUTO: 178 K/UL — SIGNIFICANT CHANGE UP (ref 150–400)
POTASSIUM SERPL-MCNC: 4.1 MMOL/L — SIGNIFICANT CHANGE UP (ref 3.5–5.3)
POTASSIUM SERPL-SCNC: 4.1 MMOL/L — SIGNIFICANT CHANGE UP (ref 3.5–5.3)
RBC # BLD: 5.04 M/UL — SIGNIFICANT CHANGE UP (ref 3.8–5.2)
RBC # FLD: 22.2 % — HIGH (ref 10.3–14.5)
SODIUM SERPL-SCNC: 141 MMOL/L — SIGNIFICANT CHANGE UP (ref 135–145)
WBC # BLD: 5.35 K/UL — SIGNIFICANT CHANGE UP (ref 3.8–10.5)
WBC # FLD AUTO: 5.35 K/UL — SIGNIFICANT CHANGE UP (ref 3.8–10.5)

## 2024-07-05 RX ADMIN — ENOXAPARIN SODIUM 40 MILLIGRAM(S): 100 INJECTION SUBCUTANEOUS at 22:13

## 2024-07-05 RX ADMIN — ASPIRIN 81 MILLIGRAM(S): 325 TABLET, FILM COATED ORAL at 12:37

## 2024-07-05 RX ADMIN — NICOTINE POLACRILEX 1 PATCH: 2 LOZENGE ORAL at 07:20

## 2024-07-05 RX ADMIN — OLANZAPINE 5 MILLIGRAM(S): 2.5 TABLET, FILM COATED ORAL at 22:03

## 2024-07-05 RX ADMIN — Medication 325 MILLIGRAM(S): at 12:37

## 2024-07-05 NOTE — PROGRESS NOTE ADULT - PROBLEM SELECTOR PLAN 2
New  TSH <0.1  fT4 2.2  tsh rec ab 2.03 (elevated)  thyroid sono w nodule (TR 2 - no FNA) and mild heterogenous appearance  Plan:  - Endocrinology rec joy  - f/u tsi
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
## 2024-07-05 NOTE — BH CONSULTATION LIAISON PROGRESS NOTE - NSBHFUPINTERVALHXFT_PSY_A_CORE
Met with the patient. Rather bizarre in her interaction today. At times illogical, mildly labile. States- ' The people in [place] was feeding off me. I was lying on road trying to kill myself, but I did not die, but they '. Irritable, tearful  Denies any si or hi when asked.   Tolerating Zyprexa, with some daytime sleepiness.
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
## 2024-07-05 NOTE — PROGRESS NOTE ADULT - SUBJECTIVE AND OBJECTIVE BOX
Rogerio Howard MD  Division of Hospitalist Medicine  Pager [phone]  Available on Teams    CC: Patient is a 39y old  Female who presents with a chief complaint of MDD, low TSH (04 Jul 2024 08:55)        SUBJECTIVE / INTERVAL HPI: Patient seen and examined at bedside. Pt grossly denies any new complaints.     ROS: negative unless otherwise stated above.    VITAL SIGNS:  Vital Signs Last 24 Hrs  T(C): 36.4 (05 Jul 2024 04:52), Max: 36.9 (04 Jul 2024 13:05)  T(F): 97.6 (05 Jul 2024 04:52), Max: 98.5 (04 Jul 2024 13:05)  HR: 65 (05 Jul 2024 04:52) (65 - 70)  BP: 138/- (05 Jul 2024 11:40) (113/65 - 138/-)  BP(mean): --  RR: 18 (05 Jul 2024 04:52) (18 - 20)  SpO2: 100% (05 Jul 2024 04:52) (100% - 100%)    Parameters below as of 05 Jul 2024 04:52  Patient On (Oxygen Delivery Method): room air            PHYSICAL EXAM:    General: NAD  HEENT: MMM  Neck: supple  Cardiovascular: +S1/S2; RRR  Respiratory: CTA B/L; no W/R/R  Gastrointestinal: soft, NT/ND  Extremities: WWP; no edema, clubbing or cyanosis  Neurological: awake and alert; communicating and able to follow commands without appreciated focal neurologic deficits    MEDICATIONS:  MEDICATIONS  (STANDING):  aspirin enteric coated 81 milliGRAM(s) Oral daily  enoxaparin Injectable 40 milliGRAM(s) SubCutaneous every 24 hours  ferrous    sulfate 325 milliGRAM(s) Oral daily  nicotine -   7 mG/24Hr(s) Patch 1 Patch Transdermal daily  OLANZapine 5 milliGRAM(s) Oral <User Schedule>    MEDICATIONS  (PRN):  acetaminophen     Tablet .. 650 milliGRAM(s) Oral every 6 hours PRN Temp greater or equal to 38C (100.4F), Mild Pain (1 - 3)  aluminum hydroxide/magnesium hydroxide/simethicone Suspension 30 milliLiter(s) Oral every 4 hours PRN Dyspepsia  melatonin 3 milliGRAM(s) Oral at bedtime PRN Insomnia  OLANZapine Injectable 2.5 milliGRAM(s) IntraMuscular every 6 hours PRN severe agitation  ondansetron Injectable 4 milliGRAM(s) IV Push every 8 hours PRN Nausea and/or Vomiting      ALLERGIES:  Allergies    No Known Allergies    Intolerances        LABS:                        8.4    5.35  )-----------( 178      ( 05 Jul 2024 04:45 )             29.0     07-05    141  |  107  |  9   ----------------------------<  82  4.1   |  22  |  0.58    Ca    9.0      05 Jul 2024 04:45  Phos  3.6     07-05  Mg     1.80     07-05        Urinalysis Basic - ( 05 Jul 2024 04:45 )    Color: x / Appearance: x / SG: x / pH: x  Gluc: 82 mg/dL / Ketone: x  / Bili: x / Urobili: x   Blood: x / Protein: x / Nitrite: x   Leuk Esterase: x / RBC: x / WBC x   Sq Epi: x / Non Sq Epi: x / Bacteria: x      CAPILLARY BLOOD GLUCOSE          RADIOLOGY & ADDITIONAL TESTS: Reviewed.
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
## 2024-07-05 NOTE — PROGRESS NOTE ADULT - SUBJECTIVE AND OBJECTIVE BOX
Chief Complaint: Hyperthyroidism    History: [employer] at bedside  Patient eating lunch  denies new complaints    MEDICATIONS  (STANDING):  aspirin enteric coated 81 milliGRAM(s) Oral daily  enoxaparin Injectable 40 milliGRAM(s) SubCutaneous every 24 hours  ferrous    sulfate 325 milliGRAM(s) Oral daily  nicotine -   7 mG/24Hr(s) Patch 1 Patch Transdermal daily  OLANZapine 5 milliGRAM(s) Oral <User Schedule>    MEDICATIONS  (PRN):  acetaminophen     Tablet .. 650 milliGRAM(s) Oral every 6 hours PRN Temp greater or equal to 38C (100.4F), Mild Pain (1 - 3)  aluminum hydroxide/magnesium hydroxide/simethicone Suspension 30 milliLiter(s) Oral every 4 hours PRN Dyspepsia  melatonin 3 milliGRAM(s) Oral at bedtime PRN Insomnia  OLANZapine Injectable 2.5 milliGRAM(s) IntraMuscular every 6 hours PRN severe agitation  ondansetron Injectable 4 milliGRAM(s) IV Push every 8 hours PRN Nausea and/or Vomiting      Allergies    No Known Allergies    Intolerances      Review of Systems:    ALL OTHER SYSTEMS REVIEWED AND NEGATIVE        PHYSICAL EXAM:  VITALS: T(C): 36.8 (07-05-24 @ 11:40)  T(F): 98.3 (07-05-24 @ 11:40), Max: 98.5 (07-04-24 @ 13:05)  HR: 85 (07-05-24 @ 11:40) (65 - 85)  BP: 138/70 (07-05-24 @ 11:40) (113/65 - 138/70)  RR:  (18 - 20)  SpO2:  (100% - 100%)  Wt(kg): --  GENERAL: NAD, well-groomed, well-developed  EYES: No proptosis, no lid lag, anicteric  HEENT:  Atraumatic, Normocephalic, moist mucous membranes  THYROID: diffusely enlarged goiter  RESPIRATORY: nonlabored respirations  PSYCH: Alert and oriented x 3, normal affect, normal mood    CAPILLARY BLOOD GLUCOSE          07-05    141  |  107  |  9   ----------------------------<  82  4.1   |  22  |  0.58    eGFR: 117    Ca    9.0      07-05  Mg     1.80     07-05  Phos  3.6     07-05        A1C with Estimated Average Glucose Result: 5.0 % (07-03-24 @ 06:45)      Thyroid Function Tests:  07-03 @ 06:45 TSH <0.10 FreeT4 2.2 T3 -- Anti TPO -- Anti Thyroglobulin Ab -- TSI --  07-02 @ 06:13 TSH <0.10 FreeT4 -- T3 163 Anti TPO -- Anti Thyroglobulin Ab -- TSI --

## 2024-07-06 LAB — TSI ACT/NOR SER: 1.01 IU/L — HIGH (ref 0–0.55)

## 2024-07-06 RX ADMIN — ASPIRIN 81 MILLIGRAM(S): 325 TABLET, FILM COATED ORAL at 11:58

## 2024-07-06 RX ADMIN — Medication 325 MILLIGRAM(S): at 11:58

## 2024-07-06 RX ADMIN — NICOTINE POLACRILEX 1 PATCH: 2 LOZENGE ORAL at 11:59

## 2024-07-06 RX ADMIN — OLANZAPINE 5 MILLIGRAM(S): 2.5 TABLET, FILM COATED ORAL at 20:58

## 2024-07-06 RX ADMIN — NICOTINE POLACRILEX 1 PATCH: 2 LOZENGE ORAL at 21:52

## 2024-07-06 RX ADMIN — ENOXAPARIN SODIUM 40 MILLIGRAM(S): 100 INJECTION SUBCUTANEOUS at 20:58

## 2024-07-06 NOTE — PROGRESS NOTE ADULT - PROBLEM SELECTOR PLAN 2
New  TSH <0.1  fT4 2.2  tsh rec ab 2.03 (elevated)  thyroid sono w nodule (TR 2 - no FNA) and mild heterogenous appearance  Plan:  - Endocrinology rec joy  - started methimazole 5mg po daily

## 2024-07-06 NOTE — PROGRESS NOTE ADULT - SUBJECTIVE AND OBJECTIVE BOX
[facility] Division of Hospital Medicine  Josh AbadDO  Available via MS Teams  In house pager [phone]    SUBJECTIVE / OVERNIGHT EVENTS:  No acute events overnight. Patient seen and examined at bedside this morning.  This morning acutely distressed, says she is being turned into a monster.    Seen sitting with PCA at bedside, menstrual contents on floor in front of her.    ADDITIONAL REVIEW OF SYSTEMS:    MEDICATIONS  (STANDING):  aspirin enteric coated 81 milliGRAM(s) Oral daily  enoxaparin Injectable 40 milliGRAM(s) SubCutaneous every 24 hours  ferrous    sulfate 325 milliGRAM(s) Oral daily  methimazole 5 milliGRAM(s) Oral daily  nicotine -   7 mG/24Hr(s) Patch 1 Patch Transdermal daily  OLANZapine 5 milliGRAM(s) Oral <User Schedule>    MEDICATIONS  (PRN):  acetaminophen     Tablet .. 650 milliGRAM(s) Oral every 6 hours PRN Temp greater or equal to 38C (100.4F), Mild Pain (1 - 3)  aluminum hydroxide/magnesium hydroxide/simethicone Suspension 30 milliLiter(s) Oral every 4 hours PRN Dyspepsia  melatonin 3 milliGRAM(s) Oral at bedtime PRN Insomnia  OLANZapine Injectable 2.5 milliGRAM(s) IntraMuscular every 6 hours PRN severe agitation  ondansetron Injectable 4 milliGRAM(s) IV Push every 8 hours PRN Nausea and/or Vomiting      I&O's Summary      PHYSICAL EXAM:  Vital Signs Last 24 Hrs  T(C): 37.1 (06 Jul 2024 12:48), Max: 37.1 (06 Jul 2024 12:48)  T(F): 98.7 (06 Jul 2024 12:48), Max: 98.7 (06 Jul 2024 12:48)  HR: 85 (06 Jul 2024 12:48) (60 - 85)  BP: 138/79 (06 Jul 2024 12:48) (123/72 - 138/79)  BP(mean): --  RR: 17 (06 Jul 2024 12:48) (17 - 18)  SpO2: 100% (06 Jul 2024 12:48) (100% - 100%)    Parameters below as of 06 Jul 2024 12:48  Patient On (Oxygen Delivery Method): room air      CONSTITUTIONAL: NAD, well-groomed  EYES: PERRLA; conjunctiva and sclera clear  ENMT: Moist oral mucosa, no pharyngeal injection or exudates  NECK: Supple, no palpable masses; no thyromegaly  RESPIRATORY: Normal respiratory effort; lungs are clear to auscultation bilaterally  CARDIOVASCULAR: Regular rate and rhythm, normal S1 and S2, no murmur/rub/gallop; No lower extremity edema; Peripheral pulses are 2+ bilaterally  ABDOMEN: Nontender to palpation, normoactive bowel sounds, no rebound/guarding; No hepatosplenomegaly  MUSCULOSKELETAL:  no clubbing or cyanosis of digits; no joint swelling or tenderness to palpation  PSYCH: A+O to person, place, and time; affect distressed  NEUROLOGY: decline neurologic exam  SKIN: No rashes; no palpable lesions    LABS:                        8.4    5.35  )-----------( 178      ( 05 Jul 2024 04:45 )             29.0     07-05    141  |  107  |  9   ----------------------------<  82  4.1   |  22  |  0.58    Ca    9.0      05 Jul 2024 04:45  Phos  3.6     07-05  Mg     1.80     07-05            Urinalysis Basic - ( 05 Jul 2024 04:45 )    Color: x / Appearance: x / SG: x / pH: x  Gluc: 82 mg/dL / Ketone: x  / Bili: x / Urobili: x   Blood: x / Protein: x / Nitrite: x   Leuk Esterase: x / RBC: x / WBC x   Sq Epi: x / Non Sq Epi: x / Bacteria: x        COVID-19 PCR: Radhatelawrence (02 Jul 2024 06:13)

## 2024-07-07 RX ADMIN — Medication 325 MILLIGRAM(S): at 12:16

## 2024-07-07 RX ADMIN — NICOTINE POLACRILEX 1 PATCH: 2 LOZENGE ORAL at 12:16

## 2024-07-07 RX ADMIN — NICOTINE POLACRILEX 1 PATCH: 2 LOZENGE ORAL at 12:21

## 2024-07-07 RX ADMIN — OLANZAPINE 5 MILLIGRAM(S): 2.5 TABLET, FILM COATED ORAL at 21:27

## 2024-07-07 RX ADMIN — ASPIRIN 81 MILLIGRAM(S): 325 TABLET, FILM COATED ORAL at 12:16

## 2024-07-07 RX ADMIN — Medication 3 MILLIGRAM(S): at 21:27

## 2024-07-07 RX ADMIN — ENOXAPARIN SODIUM 40 MILLIGRAM(S): 100 INJECTION SUBCUTANEOUS at 21:30

## 2024-07-07 NOTE — PROGRESS NOTE ADULT - SUBJECTIVE AND OBJECTIVE BOX
[facility] Division of Hospital Medicine  Oswaldo Leger MD  Pager [phone]    Patient is a 39y old  Female who presents with a chief complaint of MDD, low TSH (06 Jul 2024 13:13)      SUBJECTIVE / OVERNIGHT EVENTS: no events      MEDICATIONS  (STANDING):  aspirin enteric coated 81 milliGRAM(s) Oral daily  enoxaparin Injectable 40 milliGRAM(s) SubCutaneous every 24 hours  ferrous    sulfate 325 milliGRAM(s) Oral daily  methimazole 5 milliGRAM(s) Oral daily  nicotine -   7 mG/24Hr(s) Patch 1 Patch Transdermal daily  OLANZapine 5 milliGRAM(s) Oral <User Schedule>    MEDICATIONS  (PRN):  acetaminophen     Tablet .. 650 milliGRAM(s) Oral every 6 hours PRN Temp greater or equal to 38C (100.4F), Mild Pain (1 - 3)  aluminum hydroxide/magnesium hydroxide/simethicone Suspension 30 milliLiter(s) Oral every 4 hours PRN Dyspepsia  melatonin 3 milliGRAM(s) Oral at bedtime PRN Insomnia  OLANZapine Injectable 2.5 milliGRAM(s) IntraMuscular every 6 hours PRN severe agitation  ondansetron Injectable 4 milliGRAM(s) IV Push every 8 hours PRN Nausea and/or Vomiting      CAPILLARY BLOOD GLUCOSE        I&O's Summary      PHYSICAL EXAM:  Vital Signs Last 24 Hrs  T(C): 36.8 (07 Jul 2024 04:28), Max: 36.9 (06 Jul 2024 20:28)  T(F): 98.2 (07 Jul 2024 04:28), Max: 98.4 (06 Jul 2024 20:28)  HR: 78 (07 Jul 2024 04:28) (73 - 78)  BP: 126/63 (07 Jul 2024 04:28) (126/63 - 136/70)  BP(mean): --  RR: 18 (07 Jul 2024 04:28) (18 - 18)  SpO2: 100% (07 Jul 2024 04:28) (100% - 100%)    Parameters below as of 07 Jul 2024 04:28  Patient On (Oxygen Delivery Method): room air      CONSTITUTIONAL: NAD  EYES: EOMI, conjunctiva and sclera clear  ENMT: Moist oral mucosa  NECK: Supple  RESPIRATORY: Breathing unlabored, CTAB  CARDIOVASCULAR: S1S2 no MRG  ABDOMEN: Nontender to palpation, normoactive bowel sounds, no rebound/guarding  MUSCULOSKELETAL: no clubbing or cyanosis of digits  NEUROLOGY: No focal deficits   SKIN: No rashes or lesions

## 2024-07-08 DIAGNOSIS — R01.1 CARDIAC MURMUR, UNSPECIFIED: ICD-10-CM

## 2024-07-08 RX ADMIN — ENOXAPARIN SODIUM 40 MILLIGRAM(S): 100 INJECTION SUBCUTANEOUS at 23:01

## 2024-07-08 RX ADMIN — Medication 650 MILLIGRAM(S): at 12:12

## 2024-07-08 RX ADMIN — ASPIRIN 81 MILLIGRAM(S): 325 TABLET, FILM COATED ORAL at 12:13

## 2024-07-08 RX ADMIN — NICOTINE POLACRILEX 1 PATCH: 2 LOZENGE ORAL at 07:42

## 2024-07-08 RX ADMIN — NICOTINE POLACRILEX 1 PATCH: 2 LOZENGE ORAL at 19:41

## 2024-07-08 RX ADMIN — NICOTINE POLACRILEX 1 PATCH: 2 LOZENGE ORAL at 12:17

## 2024-07-08 RX ADMIN — NICOTINE POLACRILEX 1 PATCH: 2 LOZENGE ORAL at 12:14

## 2024-07-08 RX ADMIN — Medication 325 MILLIGRAM(S): at 12:13

## 2024-07-08 RX ADMIN — OLANZAPINE 5 MILLIGRAM(S): 2.5 TABLET, FILM COATED ORAL at 23:00

## 2024-07-08 RX ADMIN — Medication 650 MILLIGRAM(S): at 12:17

## 2024-07-08 RX ADMIN — Medication 3 MILLIGRAM(S): at 23:28

## 2024-07-08 NOTE — PROGRESS NOTE ADULT - PROBLEM SELECTOR PLAN 3
Noted to have loud hear murmur. 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 Sometimes she gets feet swelling but not now. Some ACHARYA.  - check echo
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
## 2024-07-08 NOTE — PROGRESS NOTE ADULT - PROBLEM SELECTOR PLAN 2
New  TSH <0.1  fT4 2.2  tsh rec ab 2.03 (elevated)  thyroid sono w nodule (TR 2 - no FNA) and mild heterogenous appearance  Plan:  - Endocrinology rec joy  - started methimazole 5mg po daily TSH <0.1, FT4 2.2, TSH rec ab 2.03 (elevated)  thyroid sono w nodule (TR 2 - no FNA) and mild heterogenous appearance  appreciate endo input, this is c/w Grave[facility]   - started methimazole 5mg po daily  - consider beta blocker
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
## 2024-07-09 ENCOUNTER — RESULT REVIEW (OUTPATIENT)
Age: 39
End: 2024-07-09

## 2024-07-09 RX ORDER — OLANZAPINE 2.5 MG/1
10 TABLET, FILM COATED ORAL
Refills: 0 | Status: DISCONTINUED | OUTPATIENT
Start: 2024-07-09 | End: 2024-07-12

## 2024-07-09 RX ADMIN — ASPIRIN 81 MILLIGRAM(S): 325 TABLET, FILM COATED ORAL at 11:57

## 2024-07-09 RX ADMIN — NICOTINE POLACRILEX 1 PATCH: 2 LOZENGE ORAL at 08:36

## 2024-07-09 RX ADMIN — NICOTINE POLACRILEX 1 PATCH: 2 LOZENGE ORAL at 11:59

## 2024-07-09 RX ADMIN — OLANZAPINE 10 MILLIGRAM(S): 2.5 TABLET, FILM COATED ORAL at 22:08

## 2024-07-09 RX ADMIN — NICOTINE POLACRILEX 1 PATCH: 2 LOZENGE ORAL at 13:45

## 2024-07-09 RX ADMIN — Medication 325 MILLIGRAM(S): at 11:58

## 2024-07-09 RX ADMIN — NICOTINE POLACRILEX 1 PATCH: 2 LOZENGE ORAL at 22:55

## 2024-07-09 NOTE — DIETITIAN INITIAL EVALUATION ADULT - ADD RECOMMEND
Recommend daily multivitamin supplement  Monitor weights, labs, BM's, skin integrity, p.o. intake.   Please monitor % PO intake on flowsheets  Honor food preferences as able within therapeutic diet order.

## 2024-07-09 NOTE — PROGRESS NOTE ADULT - PROBLEM SELECTOR PLAN 3
Noted to have loud hear murmur. Pt says she had heart surgery at 6yo in [place] and the "hole in my heart came back a little."  Says not seen cards since before COVID. Sometimes she gets feet swelling but not now. Some ACHARYA.  echo - as noted above LVH with mod-sev AS, will ask cards to see in am....

## 2024-07-09 NOTE — BH CONSULTATION LIAISON PROGRESS NOTE - NSBHFUPINTERVALHXFT_PSY_A_CORE
Met with the patient. Remains vague, paranoid. States- ' I wanted to kill myself so that the world will get back at them'. Stops mid sentence by stating- ' I have already said too much. They are listening'. Denies any ah when asked. Denies any SI when asked.   Tolerating zyprexa, does not have daytime sedation as before

## 2024-07-09 NOTE — DIETITIAN NUTRITION RISK NOTIFICATION - ADDITIONAL COMMENTS/DIETITIAN RECOMMENDATIONS
Please see Dietitian Initial Assessment for complete recommendations.  Mariah Patel MS, RD, CDN, CNSC (pg #[phone])
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
## 2024-07-09 NOTE — PROGRESS NOTE ADULT - SUBJECTIVE AND OBJECTIVE BOX
Chief Complaint: Hyperthyroidism/ Graves' disease    History: Patient remains handcuffed  [employer] at bedside  reports mild palpitations and restlessness    MEDICATIONS  (STANDING):  aspirin enteric coated 81 milliGRAM(s) Oral daily  enoxaparin Injectable 40 milliGRAM(s) SubCutaneous every 24 hours  ferrous    sulfate 325 milliGRAM(s) Oral daily  methimazole 5 milliGRAM(s) Oral daily  nicotine -   7 mG/24Hr(s) Patch 1 Patch Transdermal daily  OLANZapine 5 milliGRAM(s) Oral <User Schedule>    MEDICATIONS  (PRN):  acetaminophen     Tablet .. 650 milliGRAM(s) Oral every 6 hours PRN Temp greater or equal to 38C (100.4F), Mild Pain (1 - 3)  aluminum hydroxide/magnesium hydroxide/simethicone Suspension 30 milliLiter(s) Oral every 4 hours PRN Dyspepsia  melatonin 3 milliGRAM(s) Oral at bedtime PRN Insomnia  OLANZapine Injectable 2.5 milliGRAM(s) IntraMuscular every 6 hours PRN severe agitation  ondansetron Injectable 4 milliGRAM(s) IV Push every 8 hours PRN Nausea and/or Vomiting      Allergies    No Known Allergies    Intolerances      Review of Systems:    ALL OTHER SYSTEMS REVIEWED AND NEGATIVE        PHYSICAL EXAM:  VITALS: T(C): 36.8 (07-09-24 @ 11:59)  T(F): 98.3 (07-09-24 @ 11:59), Max: 98.3 (07-08-24 @ 19:44)  HR: 79 (07-09-24 @ 11:59) (67 - 86)  BP: 133/80 (07-09-24 @ 11:59) (118/60 - 133/80)  RR:  (18 - 18)  SpO2:  (100% - 100%)  Wt(kg): --  GENERAL: NAD, well-groomed, well-developed  EYES: No proptosis, no lid lag, anicteric  HEENT:  Atraumatic, Normocephalic, moist mucous membranes  THYROID: + goiter  RESPIRATORY: nonlabored respirations  NEURO: extraocular movements intact, no tremor  PSYCH: Alert and oriented x 3, normal affect, normal mood    CAPILLARY BLOOD GLUCOSE                  A1C with Estimated Average Glucose Result: 5.0 % (07-03-24 @ 06:45)      Thyroid Function Tests:  07-04 @ 04:45 TSH -- FreeT4 -- T3 -- Anti TPO -- Anti Thyroglobulin Ab -- TSI 1.01  07-03 @ 06:45 TSH <0.10 FreeT4 2.2 T3 -- Anti TPO -- Anti Thyroglobulin Ab -- TSI --

## 2024-07-09 NOTE — DIETITIAN INITIAL EVALUATION ADULT - ETIOLOGY
in context of social and environmental circumstances in context of chronic illness/condition (anxiety and depression, hyperthyroidism)

## 2024-07-09 NOTE — DIETITIAN INITIAL EVALUATION ADULT - ORAL INTAKE PTA/DIET HISTORY
Pt reported variable appetite and intake. Stated she was often not able to have regular meals due to not having any money, stated she was not employed due to having occasional aggressive tendency and personal issues however interested in getting a job. Pt reported he is unable to cook and did not have a set residence. Usually had foods from outside. Denied food allergies or chewing or swallowing difficulties. Endorsed concerns for weightloss. reported being around 150-170lbs a few years back, (per endocrinology consult note pt reported 50lb unintentional weight loss x1 year). Unable to specify recent weights . Recent HIE in aug 2023   130 lbs/59 kg current chart weight noted as 80.70 kg (176 lbs; seems highly questionable as pt appears closer to 130lbs) Unable to obtain bedscale wt as pt OOB at this time. will continue to follow.
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
## 2024-07-09 NOTE — DIETITIAN INITIAL EVALUATION ADULT - PERTINENT MEDS FT
MEDICATIONS  (STANDING):  aspirin enteric coated 81 milliGRAM(s) Oral daily  enoxaparin Injectable 40 milliGRAM(s) SubCutaneous every 24 hours  ferrous    sulfate 325 milliGRAM(s) Oral daily  methimazole 5 milliGRAM(s) Oral daily  nicotine -   7 mG/24Hr(s) Patch 1 Patch Transdermal daily  OLANZapine 5 milliGRAM(s) Oral <User Schedule>    MEDICATIONS  (PRN):  acetaminophen     Tablet .. 650 milliGRAM(s) Oral every 6 hours PRN Temp greater or equal to 38C (100.4F), Mild Pain (1 - 3)  aluminum hydroxide/magnesium hydroxide/simethicone Suspension 30 milliLiter(s) Oral every 4 hours PRN Dyspepsia  melatonin 3 milliGRAM(s) Oral at bedtime PRN Insomnia  OLANZapine Injectable 2.5 milliGRAM(s) IntraMuscular every 6 hours PRN severe agitation  ondansetron Injectable 4 milliGRAM(s) IV Push every 8 hours PRN Nausea and/or Vomiting

## 2024-07-09 NOTE — PROGRESS NOTE ADULT - SUBJECTIVE AND OBJECTIVE BOX
[facility] Division of Hospital Medicine  May Lam MD  Pager (M-F, 8A-5P):  In-house pager [phone]; Long-range pager [phone]  Other Times:  Please page Hospitalist in Charge -  In-house pager [phone]    Patient is a 39y old  Female who presents with a chief complaint of MDD, low TSH (08 Jul 2024 15:57)    SUBJECTIVE / OVERNIGHT EVENTS:  No problems reported over night.    ADDITIONAL REVIEW OF SYSTEMS:    MEDICATIONS  (STANDING):  aspirin enteric coated 81 milliGRAM(s) Oral daily  enoxaparin Injectable 40 milliGRAM(s) SubCutaneous every 24 hours  ferrous    sulfate 325 milliGRAM(s) Oral daily  methimazole 5 milliGRAM(s) Oral daily  nicotine -   7 mG/24Hr(s) Patch 1 Patch Transdermal daily  OLANZapine 5 milliGRAM(s) Oral <User Schedule>    MEDICATIONS  (PRN):  acetaminophen     Tablet .. 650 milliGRAM(s) Oral every 6 hours PRN Temp greater or equal to 38C (100.4F), Mild Pain (1 - 3)  aluminum hydroxide/magnesium hydroxide/simethicone Suspension 30 milliLiter(s) Oral every 4 hours PRN Dyspepsia  melatonin 3 milliGRAM(s) Oral at bedtime PRN Insomnia  OLANZapine Injectable 2.5 milliGRAM(s) IntraMuscular every 6 hours PRN severe agitation  ondansetron Injectable 4 milliGRAM(s) IV Push every 8 hours PRN Nausea and/or Vomiting    PHYSICAL EXAM:  Vital Signs Last 24 Hrs  T(C): 36.4 (09 Jul 2024 05:17), Max: 36.8 (08 Jul 2024 19:44)  T(F): 97.5 (09 Jul 2024 05:17), Max: 98.3 (08 Jul 2024 19:44)  HR: 67 (09 Jul 2024 05:17) (67 - 86)  BP: 118/60 (09 Jul 2024 05:17) (118/60 - 135/56)  BP(mean): --  RR: 18 (09 Jul 2024 05:17) (18 - 18)  SpO2: 100% (09 Jul 2024 05:17) (97% - 100%)    Parameters below as of 09 Jul 2024 05:17  Patient On (Oxygen Delivery Method): room air    CONSTITUTIONAL: thin BF, handcuffed to bed railing, standing at bedside, bit restless  RESPIRATORY: Normal respiratory effort; lungs are clear to auscultation bilaterally  CARDIOVASCULAR: loud systolic murmur  ABDOMEN: Nontender to palpation, normoactive bowel sounds, no rebound/guarding  MUSCLOSKELETAL:  Normal gait; no clubbing or cyanosis of digits; no joint swelling or tenderness to palpation  PSYCH: bit restless, oriented [facility], July 6th or 7th, 2024, [place]  NEUROLOGY: nonfocal  SKIN: No rashes; no palpable lesions    LABS:    RADIOLOGY & ADDITIONAL TESTS:  Results Reviewed:   Imaging Personally Reviewed:  Electrocardiogram Personally Reviewed:    COORDINATION OF CARE:  Care Discussed with Consultants/Other Providers [Y/N]: RN, SW, CM, ACP, Psych re overall care   Prior or Outpatient Records Reviewed [Y/N]:   [facility] Division of Hospital Medicine  May Lam MD  Pager (M-F, 8A-5P):  In-house pager [phone]; Long-range pager [phone]  Other Times:  Please page Hospitalist in Charge -  In-house pager [phone]    Patient is a 39y old  Female who presents with a chief complaint of MDD, low TSH (08 Jul 2024 15:57)    SUBJECTIVE / OVERNIGHT EVENTS:  No problems reported over night.  Pt seen sitting at bedside, handcuffed. Doing ok. Some palpitations.   ADDITIONAL REVIEW OF SYSTEMS:    MEDICATIONS  (STANDING):  aspirin enteric coated 81 milliGRAM(s) Oral daily  enoxaparin Injectable 40 milliGRAM(s) SubCutaneous every 24 hours  ferrous    sulfate 325 milliGRAM(s) Oral daily  methimazole 5 milliGRAM(s) Oral daily  nicotine -   7 mG/24Hr(s) Patch 1 Patch Transdermal daily  OLANZapine 5 milliGRAM(s) Oral <User Schedule>    MEDICATIONS  (PRN):  acetaminophen     Tablet .. 650 milliGRAM(s) Oral every 6 hours PRN Temp greater or equal to 38C (100.4F), Mild Pain (1 - 3)  aluminum hydroxide/magnesium hydroxide/simethicone Suspension 30 milliLiter(s) Oral every 4 hours PRN Dyspepsia  melatonin 3 milliGRAM(s) Oral at bedtime PRN Insomnia  OLANZapine Injectable 2.5 milliGRAM(s) IntraMuscular every 6 hours PRN severe agitation  ondansetron Injectable 4 milliGRAM(s) IV Push every 8 hours PRN Nausea and/or Vomiting    PHYSICAL EXAM:  Vital Signs Last 24 Hrs  T(C): 36.4 (09 Jul 2024 05:17), Max: 36.8 (08 Jul 2024 19:44)  T(F): 97.5 (09 Jul 2024 05:17), Max: 98.3 (08 Jul 2024 19:44)  HR: 67 (09 Jul 2024 05:17) (67 - 86)  BP: 118/60 (09 Jul 2024 05:17) (118/60 - 135/56)  BP(mean): --  RR: 18 (09 Jul 2024 05:17) (18 - 18)  SpO2: 100% (09 Jul 2024 05:17) (97% - 100%)    Parameters below as of 09 Jul 2024 05:17  Patient On (Oxygen Delivery Method): room air    CONSTITUTIONAL: thin BF, handcuffed to bed railing, standing at bedside, bit restless  RESPIRATORY: Normal respiratory effort; lungs are clear to auscultation bilaterally  CARDIOVASCULAR: loud systolic murmur  ABDOMEN: Nontender to palpation, normoactive bowel sounds, no rebound/guarding  MUSCLOSKELETAL:  Normal gait; no clubbing or cyanosis of digits; no joint swelling or tenderness to palpation  PSYCH: bit restless  NEUROLOGY: nonfocal  SKIN: No rashes; no palpable lesions    LABS:  RADIOLOGY & ADDITIONAL TESTS:  < from: TTE W or WO Ultrasound Enhancing Agent (07.09.24 @ 16:06) >   1. The left ventricular cavity is normal in size. Left ventricular wall thickness is severely increased. Left ventricular systolic function is hyperdynamic with an ejection fraction visually estimated at >75%. There are no regional wall motion abnormalities seen. Severe left ventricular hypertrophy. There is increased LV mass and concentric hypertrophy.   2. Normal right ventricular cavity size and normal right ventricular systolic function.   3. Structurally normal mitral valve with normal leaflet excursion. There is trace mitral regurgitation.   4. The aortic valve anatomy cannot be determined. Cannot exclude a bicuspid aortic valve. Marked systolic doming of the aortic valve leaflets noted. There is moderate to severeaortic stenosis. The peak transaortic velocity is 4.69 m/s, peak transaortic gradient is 88.0 mmHg and mean transaortic gradient is 48.0 mmHg with an LVOT/aortic valve VTI ratio of 0.38. The aortic valve area is estimated at 0.97 cm² by the continuity equation. There is mild aortic regurgitation.    Results Reviewed:   Imaging Personally Reviewed:  Electrocardiogram Personally Reviewed:    COORDINATION OF CARE:  Care Discussed with Consultants/Other Providers [Y/N]: RN, SW, CM, ACP, Psych re overall care   Prior or Outpatient Records Reviewed [Y/N]:
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
## 2024-07-10 RX ADMIN — OLANZAPINE 10 MILLIGRAM(S): 2.5 TABLET, FILM COATED ORAL at 20:55

## 2024-07-10 RX ADMIN — Medication 1 TABLET(S): at 13:04

## 2024-07-10 RX ADMIN — NICOTINE POLACRILEX 1 PATCH: 2 LOZENGE ORAL at 11:09

## 2024-07-10 RX ADMIN — ENOXAPARIN SODIUM 40 MILLIGRAM(S): 100 INJECTION SUBCUTANEOUS at 00:36

## 2024-07-10 RX ADMIN — ENOXAPARIN SODIUM 40 MILLIGRAM(S): 100 INJECTION SUBCUTANEOUS at 21:14

## 2024-07-10 RX ADMIN — NICOTINE POLACRILEX 1 PATCH: 2 LOZENGE ORAL at 07:09

## 2024-07-10 RX ADMIN — NICOTINE POLACRILEX 1 PATCH: 2 LOZENGE ORAL at 20:42

## 2024-07-10 RX ADMIN — NICOTINE POLACRILEX 1 PATCH: 2 LOZENGE ORAL at 13:04

## 2024-07-10 RX ADMIN — ASPIRIN 81 MILLIGRAM(S): 325 TABLET, FILM COATED ORAL at 13:02

## 2024-07-10 RX ADMIN — Medication 325 MILLIGRAM(S): at 13:02
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
## 2024-07-10 NOTE — PROGRESS NOTE ADULT - PROBLEM SELECTOR PLAN 3
Noted to have loud hear murmur. Pt says she had heart surgery at 8yo in [place] and the "hole in my heart came back a little."  Says not seen cards since before COVID. Sometimes she gets feet swelling but not now. Some ACHARYA.  echo - as noted above LVH with mod-sev AS, will ask cards to see in am.... Noted to have loud hear murmur. Pt says she had heart surgery at 8yo in [place] and the "hole in my heart came back a little."  Says not seen cards since before COVID. Sometimes she gets feet swelling but not now. Some ACHARYA.  echo - as noted above LVH with mod-sev AS, f/u cards input...

## 2024-07-10 NOTE — CONSULT NOTE ADULT - ASSESSMENT
EKG SR RBBB    < from: TTE W or WO Ultrasound Enhancing Agent (07.09.24 @ 16:06) >   1. The left ventricular cavity is normal in size. Left ventricular wall thickness is severely increased. Left ventricular systolic function is hyperdynamic with an ejection fraction visually estimated at >75%. There are no regional wall motion abnormalities seen. Severe left ventricular hypertrophy. There is increased LV mass and concentric hypertrophy.   2. Normal right ventricular cavity size and normal right ventricular systolic function.   3. Structurally normal mitral valve with normal leaflet excursion. There is trace mitral regurgitation.   4. The aortic valve anatomy cannot be determined. Cannot exclude a bicuspid aortic valve. Marked systolic doming of the aortic valve leaflets noted. There is moderate to severeaortic stenosis. The peak transaortic velocity is 4.69 m/s, peak transaortic gradient is 88.0 mmHg and mean transaortic gradient is 48.0 mmHg with an LVOT/aortic valve VTI ratio of 0.38. The aortic valve area is estimated at 0.97 cm² by the continuity equation. There is mild aortic regurgitation.   5. No prior echocardiogram is available for comparison.    < end of copied text >    Assessment and Plan     1) Moderate to Sever AS:   - currently euvolemic on exam , denies CP SOB , monitor for now , ok to use Beta blockers avoid hypotension      2) Graves ds : treatement per endo     3) DVT PPX lovenox
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
## 2024-07-10 NOTE — PROGRESS NOTE ADULT - PROBLEM SELECTOR PLAN 2
TSH <0.1, FT4 2.2, TSH rec ab 2.03 (elevated)  thyroid sono w nodule (TR 2 - no FNA) and mild heterogenous appearance  appreciate endo input, this is c/w Grave's   - started methimazole 5mg po daily  - consider beta blocker

## 2024-07-11 DIAGNOSIS — F32.A DEPRESSION, UNSPECIFIED: ICD-10-CM

## 2024-07-11 LAB
ALBUMIN SERPL ELPH-MCNC: 3.8 G/DL — SIGNIFICANT CHANGE UP (ref 3.3–5)
ALP SERPL-CCNC: 81 U/L — SIGNIFICANT CHANGE UP (ref 40–120)
ALT FLD-CCNC: 30 U/L — SIGNIFICANT CHANGE UP (ref 4–33)
ANION GAP SERPL CALC-SCNC: 14 MMOL/L — SIGNIFICANT CHANGE UP (ref 7–14)
AST SERPL-CCNC: 37 U/L — HIGH (ref 4–32)
BILIRUB SERPL-MCNC: 0.2 MG/DL — SIGNIFICANT CHANGE UP (ref 0.2–1.2)
BUN SERPL-MCNC: 20 MG/DL — SIGNIFICANT CHANGE UP (ref 7–23)
CALCIUM SERPL-MCNC: 9.3 MG/DL — SIGNIFICANT CHANGE UP (ref 8.4–10.5)
CHLORIDE SERPL-SCNC: 102 MMOL/L — SIGNIFICANT CHANGE UP (ref 98–107)
CO2 SERPL-SCNC: 22 MMOL/L — SIGNIFICANT CHANGE UP (ref 22–31)
CREAT SERPL-MCNC: 0.56 MG/DL — SIGNIFICANT CHANGE UP (ref 0.5–1.3)
EGFR: 118 ML/MIN/1.73M2 — SIGNIFICANT CHANGE UP
FLUAV AG NPH QL: SIGNIFICANT CHANGE UP
FLUBV AG NPH QL: SIGNIFICANT CHANGE UP
GLUCOSE SERPL-MCNC: 88 MG/DL — SIGNIFICANT CHANGE UP (ref 70–99)
POTASSIUM SERPL-MCNC: 4.2 MMOL/L — SIGNIFICANT CHANGE UP (ref 3.5–5.3)
POTASSIUM SERPL-SCNC: 4.2 MMOL/L — SIGNIFICANT CHANGE UP (ref 3.5–5.3)
PROT SERPL-MCNC: 7.7 G/DL — SIGNIFICANT CHANGE UP (ref 6–8.3)
RSV RNA NPH QL NAA+NON-PROBE: SIGNIFICANT CHANGE UP
SARS-COV-2 RNA SPEC QL NAA+PROBE: SIGNIFICANT CHANGE UP
SODIUM SERPL-SCNC: 138 MMOL/L — SIGNIFICANT CHANGE UP (ref 135–145)
T3 SERPL-MCNC: 145 NG/DL — SIGNIFICANT CHANGE UP (ref 80–200)
T4 FREE SERPL-MCNC: 1.8 NG/DL — SIGNIFICANT CHANGE UP (ref 0.9–1.8)
TSH SERPL-MCNC: <0.1 UIU/ML — LOW (ref 0.27–4.2)

## 2024-07-11 RX ADMIN — NICOTINE POLACRILEX 1 PATCH: 2 LOZENGE ORAL at 19:08

## 2024-07-11 RX ADMIN — Medication 325 MILLIGRAM(S): at 12:20

## 2024-07-11 RX ADMIN — OLANZAPINE 10 MILLIGRAM(S): 2.5 TABLET, FILM COATED ORAL at 19:55

## 2024-07-11 RX ADMIN — ASPIRIN 81 MILLIGRAM(S): 325 TABLET, FILM COATED ORAL at 12:19

## 2024-07-11 RX ADMIN — ENOXAPARIN SODIUM 40 MILLIGRAM(S): 100 INJECTION SUBCUTANEOUS at 22:30

## 2024-07-11 RX ADMIN — NICOTINE POLACRILEX 1 PATCH: 2 LOZENGE ORAL at 12:20

## 2024-07-11 RX ADMIN — Medication 1 TABLET(S): at 12:19
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
## 2024-07-11 NOTE — PROGRESS NOTE ADULT - SUBJECTIVE AND OBJECTIVE BOX
[place] Division of Hospital Medicine  May Lam MD  Pager (M-F, 8A-5P):  In-house pager [phone]; Long-range pager [phone]  Other Times:  Please page Hospitalist in Charge -  In-house pager [phone]    Patient is a 39y old  Female who presents with a chief complaint of MDD, low TSH (10 Jul 2024 15:57)    SUBJECTIVE / OVERNIGHT EVENTS:  No problems reported over night.    ADDITIONAL REVIEW OF SYSTEMS:    MEDICATIONS  (STANDING):  aspirin enteric coated 81 milliGRAM(s) Oral daily  enoxaparin Injectable 40 milliGRAM(s) SubCutaneous every 24 hours  ferrous    sulfate 325 milliGRAM(s) Oral daily  methimazole 5 milliGRAM(s) Oral daily  multivitamin 1 Tablet(s) Oral daily  nicotine -   7 mG/24Hr(s) Patch 1 Patch Transdermal daily  OLANZapine 10 milliGRAM(s) Oral <User Schedule>    MEDICATIONS  (PRN):  acetaminophen     Tablet .. 650 milliGRAM(s) Oral every 6 hours PRN Temp greater or equal to 38C (100.4F), Mild Pain (1 - 3)  aluminum hydroxide/magnesium hydroxide/simethicone Suspension 30 milliLiter(s) Oral every 4 hours PRN Dyspepsia  melatonin 3 milliGRAM(s) Oral at bedtime PRN Insomnia  OLANZapine Injectable 2.5 milliGRAM(s) IntraMuscular every 6 hours PRN severe agitation  ondansetron Injectable 4 milliGRAM(s) IV Push every 8 hours PRN Nausea and/or Vomiting    PHYSICAL EXAM:  Vital Signs Last 24 Hrs  T(C): 36.6 (11 Jul 2024 04:43), Max: 37.1 (10 Jul 2024 20:08)  T(F): 97.8 (11 Jul 2024 04:43), Max: 98.8 (10 Jul 2024 20:08)  HR: 66 (11 Jul 2024 04:43) (66 - 88)  BP: 135/64 (11 Jul 2024 04:43) (130/62 - 135/64)  BP(mean): --  RR: 18 (11 Jul 2024 04:43) (18 - 18)  SpO2: 100% (11 Jul 2024 04:43) (100% - 100%)    Parameters below as of 11 Jul 2024 04:43  Patient On (Oxygen Delivery Method): room air    CONSTITUTIONAL: thin BF, handcuffed to bed railing, standing at bedside, bit restless  RESPIRATORY: Normal respiratory effort; lungs are clear to auscultation bilaterally  CARDIOVASCULAR: loud systolic murmur  ABDOMEN: Nontender to palpation, normoactive bowel sounds, no rebound/guarding  MUSCLOSKELETAL:  Normal gait; no clubbing or cyanosis of digits; no joint swelling or tenderness to palpation  PSYCH: bit restless  NEUROLOGY: nonfocal  SKIN: No rashes; no palpable lesions    LABS:    RADIOLOGY & ADDITIONAL TESTS:  Results Reviewed:   Imaging Personally Reviewed:  Electrocardiogram Personally Reviewed:    COORDINATION OF CARE:  Care Discussed with Consultants/Other Providers [Y/N]: RN, SW, CM, ACP, Psych re overall care   Prior or Outpatient Records Reviewed [Y/N]:   [facility] Division of Hospital Medicine  May Lam MD  Pager (M-F, 8A-5P):  In-house pager [phone]; Long-range pager [phone]  Other Times:  Please page Hospitalist in Charge -  In-house pager [phone]    Patient is a 39y old  Female who presents with a chief complaint of MDD, low TSH (10 Jul 2024 15:57)    SUBJECTIVE / OVERNIGHT EVENTS:  No problems reported over night.  Pt seen standing at sink, cleaning up. Says she was released. Is very sorry. Hopeful for future.   ADDITIONAL REVIEW OF SYSTEMS:    MEDICATIONS  (STANDING):  aspirin enteric coated 81 milliGRAM(s) Oral daily  enoxaparin Injectable 40 milliGRAM(s) SubCutaneous every 24 hours  ferrous    sulfate 325 milliGRAM(s) Oral daily  methimazole 5 milliGRAM(s) Oral daily  multivitamin 1 Tablet(s) Oral daily  nicotine -   7 mG/24Hr(s) Patch 1 Patch Transdermal daily  OLANZapine 10 milliGRAM(s) Oral <User Schedule>    MEDICATIONS  (PRN):  acetaminophen     Tablet .. 650 milliGRAM(s) Oral every 6 hours PRN Temp greater or equal to 38C (100.4F), Mild Pain (1 - 3)  aluminum hydroxide/magnesium hydroxide/simethicone Suspension 30 milliLiter(s) Oral every 4 hours PRN Dyspepsia  melatonin 3 milliGRAM(s) Oral at bedtime PRN Insomnia  OLANZapine Injectable 2.5 milliGRAM(s) IntraMuscular every 6 hours PRN severe agitation  ondansetron Injectable 4 milliGRAM(s) IV Push every 8 hours PRN Nausea and/or Vomiting    PHYSICAL EXAM:  Vital Signs Last 24 Hrs  T(C): 36.6 (11 Jul 2024 04:43), Max: 37.1 (10 Jul 2024 20:08)  T(F): 97.8 (11 Jul 2024 04:43), Max: 98.8 (10 Jul 2024 20:08)  HR: 66 (11 Jul 2024 04:43) (66 - 88)  BP: 135/64 (11 Jul 2024 04:43) (130/62 - 135/64)  BP(mean): --  RR: 18 (11 Jul 2024 04:43) (18 - 18)  SpO2: 100% (11 Jul 2024 04:43) (100% - 100%)    Parameters below as of 11 Jul 2024 04:43  Patient On (Oxygen Delivery Method): room air    CONSTITUTIONAL: thin BF, standing at sink, calm  RESPIRATORY: Normal respiratory effort; lungs are clear to auscultation bilaterally  CARDIOVASCULAR: loud systolic murmur  ABDOMEN: Nontender to palpation, normoactive bowel sounds, no rebound/guarding  MUSCLOSKELETAL:  Normal gait; no clubbing or cyanosis of digits; no joint swelling or tenderness to palpation  PSYCH: calm  NEUROLOGY: nonfocal  SKIN: No rashes; no palpable lesions    LABS:    RADIOLOGY & ADDITIONAL TESTS:  Results Reviewed:   Imaging Personally Reviewed:  Electrocardiogram Personally Reviewed:    COORDINATION OF CARE:  Care Discussed with Consultants/Other Providers [Y/N]: RN, SW, CM, ACP, Psych re overall care   Prior or Outpatient Records Reviewed [Y/N]:
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
## 2024-07-11 NOTE — PROGRESS NOTE ADULT - SUBJECTIVE AND OBJECTIVE BOX
Khoa Lopez MD  Interventional Cardiology / Endovascular Specialist  [place] Office : [address] [place] [address] [place]. [address]  Tel:   [place] Office : [address] [facility] [place] [address]  Tel: [phone]    Pt is lying in bed in NAD  	  MEDICATIONS:  aspirin enteric coated 81 milliGRAM(s) Oral daily  enoxaparin Injectable 40 milliGRAM(s) SubCutaneous every 24 hours        acetaminophen     Tablet .. 650 milliGRAM(s) Oral every 6 hours PRN  melatonin 3 milliGRAM(s) Oral at bedtime PRN  OLANZapine 10 milliGRAM(s) Oral <User Schedule>  OLANZapine Injectable 2.5 milliGRAM(s) IntraMuscular every 6 hours PRN  ondansetron Injectable 4 milliGRAM(s) IV Push every 8 hours PRN    aluminum hydroxide/magnesium hydroxide/simethicone Suspension 30 milliLiter(s) Oral every 4 hours PRN    methimazole 5 milliGRAM(s) Oral daily    ferrous    sulfate 325 milliGRAM(s) Oral daily  multivitamin 1 Tablet(s) Oral daily      PAST MEDICAL/SURGICAL HISTORY  PAST MEDICAL & SURGICAL HISTORY:  Anxiety and depression      Murmur      No significant past surgical history          SOCIAL HISTORY: Substance Use (street drugs): ( x ) never used  (  ) other:    FAMILY HISTORY:  No pertinent family history in first degree relatives      PHYSICAL EXAM:  T(C): 37.4 (07-11-24 @ 11:40), Max: 37.4 (07-11-24 @ 11:40)  HR: 78 (07-11-24 @ 11:40) (66 - 88)  BP: 146/64 (07-11-24 @ 11:40) (132/69 - 146/64)  RR: 18 (07-11-24 @ 11:40) (18 - 18)  SpO2: 100% (07-11-24 @ 11:40) (100% - 100%)  Wt(kg): --  I&O's Summary        GENERAL: NAD  EYES: conjunctiva and sclera clear  ENMT: No tonsillar erythema, exudates, or enlargement  Cardiovascular: Normal S1 S2, No JVD, systolic murmur ++  Respiratory: Lungs clear to auscultation	  Gastrointestinal:  Soft, Non-tender, + BS	  Extremities: No edema            proBNP:   Lipid Profile:   HgA1c:   TSH:     Consultant(s) Notes Reviewed:  [x ] YES  [ ] NO    Care Discussed with Consultants/Other Providers [ x] YES  [ ] NO    Imaging Personally Reviewed independently:  [x] YES  [ ] NO    All labs, radiologic studies, vitals, orders and medications list reviewed. Patient is seen and examined at bedside. Case discussed with medical team.

## 2024-07-11 NOTE — PROGRESS NOTE ADULT - PROBLEM SELECTOR PLAN 3
Noted to have loud hear murmur. Pt says she had heart surgery at 8yo in [place] and the "hole in my heart came back a little."  Says not seen cards since before COVID. Sometimes she gets feet swelling but not now. Some ACHARYA.  echo - as noted above LVH with mod-sev AS, f/u cards input... Noted to have loud hear murmur. Pt says she had heart surgery at 8yo in [place] and the "hole in my heart came back a little."  Says not seen cards since before COVID. Sometimes she gets feet swelling but not now. Some ACHARYA.  echo - as noted above LVH with mod-sev AS, appreciate cards input, avoid hypotension
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
## 2024-07-11 NOTE — PROGRESS NOTE ADULT - NSPROGADDITIONALINFOA_GEN_ALL_CORE
under arrest, has been fingerprinted, await bedside arraignment.... under arrest, has been fingerprinted, s/p bedside arraignment, ROR. Now await inpt psych bed

## 2024-07-11 NOTE — PROGRESS NOTE ADULT - PROBLEM SELECTOR PLAN 2
TSH <0.1, FT4 2.2, TSH rec ab 2.03 (elevated)  thyroid sono w nodule (TR 2 - no FNA) and mild heterogenous appearance  appreciate endo input, this is c/w Grave's   - started methimazole 5mg po daily  - consider beta blocker TSH <0.1, FT4 2.2, TSH rec ab 2.03 (elevated)  thyroid sono w nodule (TR 2 - no FNA) and mild heterogenous appearance  appreciate endo input, this is c/w Grave's   - started methimazole 5mg po daily  --> f/u repeat TFTs; will hold on starting beta blocker for now

## 2024-07-11 NOTE — PROGRESS NOTE ADULT - SUBJECTIVE AND OBJECTIVE BOX
Chief Complaint: Hyperthyroidism    History: No longer arrested  tolerating po intake  reports did not experience palpitations today thus far    MEDICATIONS  (STANDING):  aspirin enteric coated 81 milliGRAM(s) Oral daily  enoxaparin Injectable 40 milliGRAM(s) SubCutaneous every 24 hours  ferrous    sulfate 325 milliGRAM(s) Oral daily  methimazole 5 milliGRAM(s) Oral daily  multivitamin 1 Tablet(s) Oral daily  nicotine -   7 mG/24Hr(s) Patch 1 Patch Transdermal daily  OLANZapine 10 milliGRAM(s) Oral <User Schedule>    MEDICATIONS  (PRN):  acetaminophen     Tablet .. 650 milliGRAM(s) Oral every 6 hours PRN Temp greater or equal to 38C (100.4F), Mild Pain (1 - 3)  aluminum hydroxide/magnesium hydroxide/simethicone Suspension 30 milliLiter(s) Oral every 4 hours PRN Dyspepsia  melatonin 3 milliGRAM(s) Oral at bedtime PRN Insomnia  OLANZapine Injectable 2.5 milliGRAM(s) IntraMuscular every 6 hours PRN severe agitation  ondansetron Injectable 4 milliGRAM(s) IV Push every 8 hours PRN Nausea and/or Vomiting      Allergies    No Known Allergies    Intolerances      Review of Systems:    ALL OTHER SYSTEMS REVIEWED AND NEGATIVE      PHYSICAL EXAM:  VITALS: T(C): 37.4 (07-11-24 @ 11:40)  T(F): 99.4 (07-11-24 @ 11:40), Max: 99.4 (07-11-24 @ 11:40)  HR: 78 (07-11-24 @ 11:40) (66 - 88)  BP: 146/64 (07-11-24 @ 11:40) (132/69 - 146/64)  RR:  (18 - 18)  SpO2:  (100% - 100%)  Wt(kg): --  GENERAL: NAD, well-groomed, well-developed  EYES: No proptosis, no lid lag, anicteric  HEENT:  Atraumatic, Normocephalic, moist mucous membranes  RESPIRATORY: nonlabored respirations  PSYCH: Alert and oriented x 3, normal affect, normal mood    CAPILLARY BLOOD GLUCOSE                  A1C with Estimated Average Glucose Result: 5.0 % (07-03-24 @ 06:45)      Thyroid Function Tests:  07-04 @ 04:45 TSH -- FreeT4 -- T3 -- Anti TPO -- Anti Thyroglobulin Ab -- TSI 1.01  07-03 @ 06:45 TSH <0.10 FreeT4 2.2 T3 -- Anti TPO -- Anti Thyroglobulin Ab -- TSI --

## 2024-07-11 NOTE — PROGRESS NOTE ADULT - PROBLEM SELECTOR PROBLEM 1
Psychotic disorder Adbry Pregnancy And Lactation Text: It is unknown if this medication will adversely affect pregnancy or breast feeding.

## 2024-07-12 ENCOUNTER — INPATIENT (INPATIENT)
Facility: HOSPITAL | Age: 39
LOS: 26 days | Discharge: HOME | End: 2024-08-08
Attending: PSYCHIATRY & NEUROLOGY | Admitting: PSYCHIATRY & NEUROLOGY
Payer: MEDICAID

## 2024-07-12 ENCOUNTER — TRANSCRIPTION ENCOUNTER (OUTPATIENT)
Age: 39
End: 2024-07-12

## 2024-07-12 VITALS — WEIGHT: 126.1 LBS | RESPIRATION RATE: 18 BRPM | TEMPERATURE: 98 F | HEIGHT: 63 IN

## 2024-07-12 VITALS
HEART RATE: 79 BPM | DIASTOLIC BLOOD PRESSURE: 65 MMHG | SYSTOLIC BLOOD PRESSURE: 124 MMHG | RESPIRATION RATE: 18 BRPM | OXYGEN SATURATION: 100 % | TEMPERATURE: 98 F

## 2024-07-12 DIAGNOSIS — Z87.898 PERSONAL HISTORY OF OTHER SPECIFIED CONDITIONS: ICD-10-CM

## 2024-07-12 DIAGNOSIS — F29 UNSPECIFIED PSYCHOSIS NOT DUE TO A SUBSTANCE OR KNOWN PHYSIOLOGICAL CONDITION: ICD-10-CM

## 2024-07-12 DIAGNOSIS — R01.1 CARDIAC MURMUR, UNSPECIFIED: ICD-10-CM

## 2024-07-12 DIAGNOSIS — F12.10 CANNABIS ABUSE, UNCOMPLICATED: ICD-10-CM

## 2024-07-12 DIAGNOSIS — D50.9 IRON DEFICIENCY ANEMIA, UNSPECIFIED: ICD-10-CM

## 2024-07-12 DIAGNOSIS — D64.9 ANEMIA, UNSPECIFIED: ICD-10-CM

## 2024-07-12 DIAGNOSIS — E05.90 THYROTOXICOSIS, UNSPECIFIED WITHOUT THYROTOXIC CRISIS OR STORM: ICD-10-CM

## 2024-07-12 RX ORDER — NICOTINE POLACRILEX 2 MG/1
0 LOZENGE ORAL
Qty: 0 | Refills: 0 | DISCHARGE
Start: 2024-07-12

## 2024-07-12 RX ORDER — ACETAMINOPHEN 325 MG
2 TABLET ORAL
Qty: 0 | Refills: 0 | DISCHARGE
Start: 2024-07-12

## 2024-07-12 RX ORDER — ENOXAPARIN SODIUM 120 MG/.8ML
40 INJECTION SUBCUTANEOUS
Refills: 0 | Status: DISCONTINUED | OUTPATIENT
Start: 2024-07-12 | End: 2024-07-12

## 2024-07-12 RX ORDER — NICOTINE 21 MG/24HR
1 PATCH, TRANSDERMAL 24 HOURS TRANSDERMAL DAILY
Refills: 0 | Status: DISCONTINUED | OUTPATIENT
Start: 2024-07-12 | End: 2024-08-08

## 2024-07-12 RX ORDER — NICOTINE 21 MG/24HR
2 PATCH, TRANSDERMAL 24 HOURS TRANSDERMAL
Refills: 0 | Status: DISCONTINUED | OUTPATIENT
Start: 2024-07-12 | End: 2024-08-08

## 2024-07-12 RX ORDER — HYDROXYZINE HCL 50 MG/ML
50 VIAL (ML) INTRAMUSCULAR EVERY 6 HOURS
Refills: 0 | Status: DISCONTINUED | OUTPATIENT
Start: 2024-07-12 | End: 2024-08-08

## 2024-07-12 RX ORDER — FERROUS SULFATE 325(65) MG
1 TABLET ORAL
Qty: 0 | Refills: 0 | DISCHARGE
Start: 2024-07-12

## 2024-07-12 RX ORDER — DIPHENHYDRAMINE HCL 25 MG
50 CAPSULE ORAL ONCE
Refills: 0 | Status: DISCONTINUED | OUTPATIENT
Start: 2024-07-12 | End: 2024-08-08

## 2024-07-12 RX ORDER — METHIMAZOLE 10 MG
1 TABLET ORAL
Qty: 0 | Refills: 0 | DISCHARGE
Start: 2024-07-12

## 2024-07-12 RX ORDER — ASPIRIN 500 MG
81 TABLET ORAL DAILY
Refills: 0 | Status: DISCONTINUED | OUTPATIENT
Start: 2024-07-12 | End: 2024-08-08

## 2024-07-12 RX ORDER — ACETAMINOPHEN 500 MG
650 TABLET ORAL EVERY 6 HOURS
Refills: 0 | Status: DISCONTINUED | OUTPATIENT
Start: 2024-07-12 | End: 2024-08-08

## 2024-07-12 RX ORDER — CYANOCOBALAMIN/FOLIC AC/VIT B6 2-2.5-25MG
1 TABLET ORAL DAILY
Refills: 0 | Status: DISCONTINUED | OUTPATIENT
Start: 2024-07-12 | End: 2024-08-08

## 2024-07-12 RX ORDER — OLANZAPINE 2.5 MG/1
1 TABLET, FILM COATED ORAL
Qty: 0 | Refills: 0 | DISCHARGE
Start: 2024-07-12

## 2024-07-12 RX ORDER — NICOTINE POLACRILEX 2 MG/1
2 LOZENGE ORAL
Refills: 0 | Status: DISCONTINUED | OUTPATIENT
Start: 2024-07-12 | End: 2024-07-12

## 2024-07-12 RX ORDER — DIPHENHYDRAMINE HCL 25 MG
50 CAPSULE ORAL EVERY 6 HOURS
Refills: 0 | Status: DISCONTINUED | OUTPATIENT
Start: 2024-07-12 | End: 2024-08-08

## 2024-07-12 RX ORDER — MELATONIN 3 MG
3 TABLET ORAL AT BEDTIME
Refills: 0 | Status: DISCONTINUED | OUTPATIENT
Start: 2024-07-12 | End: 2024-08-08

## 2024-07-12 RX ORDER — FERROUS SULFATE 325(65) MG
325 TABLET ORAL DAILY
Refills: 0 | Status: DISCONTINUED | OUTPATIENT
Start: 2024-07-12 | End: 2024-08-08

## 2024-07-12 RX ADMIN — Medication 2 MILLIGRAM(S): at 17:49

## 2024-07-12 RX ADMIN — Medication 2 MILLIGRAM(S): at 20:54

## 2024-07-12 RX ADMIN — Medication 1 TABLET(S): at 12:24

## 2024-07-12 RX ADMIN — Medication 10 MILLIGRAM(S): at 20:53

## 2024-07-12 RX ADMIN — Medication 3 MILLIGRAM(S): at 20:53

## 2024-07-12 RX ADMIN — Medication 650 MILLIGRAM(S): at 18:38

## 2024-07-12 RX ADMIN — Medication 325 MILLIGRAM(S): at 12:24

## 2024-07-12 RX ADMIN — Medication 1 PATCH: at 17:53

## 2024-07-12 RX ADMIN — ASPIRIN 81 MILLIGRAM(S): 325 TABLET, FILM COATED ORAL at 12:24

## 2024-07-12 RX ADMIN — Medication 650 MILLIGRAM(S): at 17:48

## 2024-07-12 NOTE — BH CONSULTATION LIAISON PROGRESS NOTE - NSBHMSETHTPROC_PSY_A_CORE
Overinclusive/Circumstantial
Perseverative/Illogical
Perseverative/Illogical
Illogical
Perseverative/Illogical
Overinclusive/Perseverative

## 2024-07-12 NOTE — PROGRESS NOTE ADULT - REASON FOR ADMISSION
MDD, low TSH

## 2024-07-12 NOTE — PROGRESS NOTE ADULT - PROBLEM SELECTOR PROBLEM 2
Abnormal thyroid function test
Low TSH level
Abnormal thyroid function test
Low TSH level
Abnormal thyroid function test
Low TSH level

## 2024-07-12 NOTE — BH CONSULTATION LIAISON PROGRESS NOTE - NSBHMSEPERCEPT_PSY_A_CORE
Auditory hallucinations
No abnormalities
Unable to assess
denying any further whispers/No abnormalities
No abnormalities/Other

## 2024-07-12 NOTE — PROGRESS NOTE ADULT - NSPROGADDITIONALINFOA_GEN_ALL_CORE
under arrest, has been fingerprinted, s/p bedside arraignment, ROR. Now await inpt psych bed D/w Hospitalist at [facility] re overall care.  Spent 45 minutes counseling and coordinating discharge care. D/w CM/SW, pt may have been homeless PTA  D/w Hospitalist at [facility] re overall care.  Spent 60 minutes counseling and coordinating discharge care.

## 2024-07-12 NOTE — PROGRESS NOTE ADULT - NUTRITIONAL ASSESSMENT
This patient has been assessed with a concern for Malnutrition and has been determined to have a diagnosis/diagnoses of Moderate protein-calorie malnutrition.    This patient is being managed with:   Diet Regular-  Supplement Feeding Modality:  Oral  Ensure Plus High Protein Cans or Servings Per Day:  1       Frequency:  Three Times a day  Entered: Jul 9 2024  4:37PM  

## 2024-07-12 NOTE — BH PATIENT PROFILE - NSVRISKLVLREC_PSY_ALL_CORE
Podiatric Surgery Plan of Care Note  Fe Jerez      Discussed surgical intervention with patient and patient's wife at bedside, along with the patient's daughter via telephone. All potential risks, benefits, and complications were discussed. No guarantees or promises were made to what the outcomes will be. Also discussed code status. The patient's family expressed they would like to change the patient's current code status from Full code to DNR. Patient's RN contacted the medicine team to discuss code status with the patient and patient family. I discussed with the patient's family that certain elements of the DNR cannot be suspended and are necessary for the success of anesthesia and the proposed surgical procedure. We also discussed limited DNR vs suspended the DNR during the perioperative period. The patient's family wishes to suspend the DNR during the perioperative period. The consent was not signed at this time, however will be signed tomorrow AM (along with the addendum to the surgical consent for patient with a DNR order) prior to surgery with the anesthesiologist, the surgeon, and family present (or via telephone).      Discussed with Dr. Mateusz Leggett DPM.    Simran Chappell DPM  Podiatric Resident, PGY-3  Pager #: [phone] or Perfect Serve Moderate-High (Score 7-10)
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
## 2024-07-12 NOTE — BH CONSULTATION LIAISON PROGRESS NOTE - NSBHMSETHTCONTENT_PSY_A_CORE
Delusions
ruminating on relationship conflicts/Ruminations
Delusions
Delusions/Hopelessness
Delusions
Unremarkable

## 2024-07-12 NOTE — BH CONSULTATION LIAISON PROGRESS NOTE - NSBHPTASSESSDT_PSY_A_CORE
12-Jul-2024 13:44
03-Jul-2024 15:12
09-Jul-2024 14:18
11-Jul-2024 11:57
05-Jul-2024 15:11
04-Jul-2024 10:44

## 2024-07-12 NOTE — BH INPATIENT PSYCHIATRY ASSESSMENT NOTE - CURRENT MEDICATION
MEDICATIONS  (STANDING):  aspirin enteric coated 81 milliGRAM(s) Oral daily  ferrous    sulfate 325 milliGRAM(s) Oral daily  melatonin. 3 milliGRAM(s) Oral at bedtime  methimazole 5 milliGRAM(s) Oral daily  multivitamin 1 Tablet(s) Oral daily  nicotine -   7 mG/24Hr(s) Patch 1 Patch Transdermal daily  OLANZapine 10 milliGRAM(s) Oral at bedtime    MEDICATIONS  (PRN):  acetaminophen     Tablet .. 650 milliGRAM(s) Oral every 6 hours PRN Temp greater or equal to 38C (100.4F), Mild Pain (1 - 3)  diphenhydrAMINE 50 milliGRAM(s) Oral every 6 hours PRN EPS  diphenhydrAMINE Injectable 50 milliGRAM(s) IntraMuscular once PRN EPS from PRN for severe agitation  hydrOXYzine hydrochloride 50 milliGRAM(s) Oral every 6 hours PRN anxiety  nicotine  Polacrilex Gum 2 milliGRAM(s) Oral every 2 hours PRN Smoking Cessation  OLANZapine 5 milliGRAM(s) Oral every 6 hours PRN agitation  OLANZapine Injectable 5 milliGRAM(s) IntraMuscular once PRN severe agitation

## 2024-07-12 NOTE — BH INPATIENT PSYCHIATRY ASSESSMENT NOTE - ATTENDING COMMENTS
Care was discussed and reviewed in the interdisciplinary treatment team.  I, Amanda Rizzo MD, have reviewed and verified the documentation.  I independently performed the documented medical decision making.     Patient was seen and evaluated today with NP, nurse and this writer present during the assessment. Patient did reported having serious marital problems, she has been suicidal recently, using excessive amounts of alcohol and THC. Is unclear if he is following with any provider. She is thong for safety here in the unit and in agreement with taking medications.

## 2024-07-12 NOTE — BH INPATIENT PSYCHIATRY ASSESSMENT NOTE - NSBHATTESTAPPBILLTIME_PSY_A_CORE
I attest my time as SHERIF is greater than 50% of the total combined time spent on qualifying patient care activities. I have reviewed and verified the documentation.

## 2024-07-12 NOTE — PROGRESS NOTE ADULT - SUBJECTIVE AND OBJECTIVE BOX
Chief Complaint: Hyperthyroidism/ Graves disease    History: obtained bed for transfer to [facility]  no acute events   vitals stable  labs repeated    MEDICATIONS  (STANDING):  aspirin enteric coated 81 milliGRAM(s) Oral daily  enoxaparin Injectable 40 milliGRAM(s) SubCutaneous every 24 hours  ferrous    sulfate 325 milliGRAM(s) Oral daily  methimazole 5 milliGRAM(s) Oral daily  multivitamin 1 Tablet(s) Oral daily  nicotine -   7 mG/24Hr(s) Patch 1 Patch Transdermal daily  OLANZapine 10 milliGRAM(s) Oral <User Schedule>    MEDICATIONS  (PRN):  acetaminophen     Tablet .. 650 milliGRAM(s) Oral every 6 hours PRN Temp greater or equal to 38C (100.4F), Mild Pain (1 - 3)  aluminum hydroxide/magnesium hydroxide/simethicone Suspension 30 milliLiter(s) Oral every 4 hours PRN Dyspepsia  melatonin 3 milliGRAM(s) Oral at bedtime PRN Insomnia  OLANZapine Injectable 2.5 milliGRAM(s) IntraMuscular every 6 hours PRN severe agitation  ondansetron Injectable 4 milliGRAM(s) IV Push every 8 hours PRN Nausea and/or Vomiting      Allergies    No Known Allergies    Intolerances      Review of Systems:  ALL OTHER SYSTEMS REVIEWED AND NEGATIVE        PHYSICAL EXAM:  VITALS: T(C): 36.7 (07-12-24 @ 11:42)  T(F): 98.1 (07-12-24 @ 11:42), Max: 98.8 (07-11-24 @ 22:09)  HR: 73 (07-12-24 @ 11:42) (73 - 78)  BP: 145/61 (07-12-24 @ 11:42) (111/51 - 145/61)  RR:  (17 - 18)  SpO2:  (99% - 100%)  Wt(kg): --  GENERAL: NAD, well-groomed, well-developed  EYES: No proptosis, no lid lag, anicteric  HEENT:  Atraumatic, Normocephalic, moist mucous membranes  RESPIRATORY: nonlabored respirations, no wheezing  PSYCH: Alert and oriented    CAPILLARY BLOOD GLUCOSE          07-11    138  |  102  |  20  ----------------------------<  88  4.2   |  22  |  0.56    eGFR: 118    Ca    9.3      07-11    TPro  7.7  /  Alb  3.8  /  TBili  0.2  /  DBili  x   /  AST  37<H>  /  ALT  30  /  AlkPhos  81  07-11      A1C with Estimated Average Glucose Result: 5.0 % (07-03-24 @ 06:45)      Thyroid Function Tests:  07-11 @ 13:54 TSH <0.10 FreeT4 1.8 T3 145 Anti TPO -- Anti Thyroglobulin Ab -- TSI --  07-04 @ 04:45 TSH -- FreeT4 -- T3 -- Anti TPO -- Anti Thyroglobulin Ab -- TSI 1.01

## 2024-07-12 NOTE — BH CONSULTATION LIAISON PROGRESS NOTE - NSBHCHARTREVIEWVS_PSY_A_CORE FT
Vital Signs Last 24 Hrs  T(C): 36.6 (04 Jul 2024 05:00), Max: 36.8 (03 Jul 2024 11:25)  T(F): 97.9 (04 Jul 2024 05:00), Max: 98.3 (03 Jul 2024 11:25)  HR: 81 (04 Jul 2024 05:00) (64 - 81)  BP: 127/70 (04 Jul 2024 05:00) (106/61 - 127/70)  BP(mean): --  RR: 19 (04 Jul 2024 05:00) (18 - 19)  SpO2: 100% (04 Jul 2024 05:00) (100% - 100%)    Parameters below as of 04 Jul 2024 05:00  Patient On (Oxygen Delivery Method): room air    
Vital Signs Last 24 Hrs  T(C): 36.8 (03 Jul 2024 11:25), Max: 36.9 (02 Jul 2024 15:44)  T(F): 98.3 (03 Jul 2024 11:25), Max: 98.4 (02 Jul 2024 15:44)  HR: 75 (03 Jul 2024 11:25) (69 - 75)  BP: 106/61 (03 Jul 2024 11:25) (106/61 - 122/70)  BP(mean): --  RR: 18 (03 Jul 2024 11:25) (17 - 20)  SpO2: 100% (03 Jul 2024 11:25) (98% - 100%)    Parameters below as of 03 Jul 2024 11:25  Patient On (Oxygen Delivery Method): room air    
Vital Signs Last 24 Hrs  T(C): 36.7 (12 Jul 2024 11:42), Max: 37.1 (11 Jul 2024 22:09)  T(F): 98.1 (12 Jul 2024 11:42), Max: 98.8 (11 Jul 2024 22:09)  HR: 73 (12 Jul 2024 11:42) (73 - 78)  BP: 145/61 (12 Jul 2024 11:42) (111/51 - 145/61)  BP(mean): --  RR: 18 (12 Jul 2024 11:42) (17 - 18)  SpO2: 99% (12 Jul 2024 11:42) (99% - 100%)    Parameters below as of 12 Jul 2024 11:42  Patient On (Oxygen Delivery Method): room air    
Vital Signs Last 24 Hrs  T(C): 36.8 (05 Jul 2024 11:40), Max: 36.8 (05 Jul 2024 11:40)  T(F): 98.3 (05 Jul 2024 11:40), Max: 98.3 (05 Jul 2024 11:40)  HR: 85 (05 Jul 2024 11:40) (65 - 85)  BP: 138/70 (05 Jul 2024 11:40) (120/62 - 138/70)  BP(mean): --  RR: 18 (05 Jul 2024 11:40) (18 - 18)  SpO2: 100% (05 Jul 2024 11:40) (100% - 100%)    Parameters below as of 05 Jul 2024 11:40  Patient On (Oxygen Delivery Method): room air    
Vital Signs Last 24 Hrs  T(C): 37.4 (11 Jul 2024 11:40), Max: 37.4 (11 Jul 2024 11:40)  T(F): 99.4 (11 Jul 2024 11:40), Max: 99.4 (11 Jul 2024 11:40)  HR: 78 (11 Jul 2024 11:40) (66 - 88)  BP: 146/64 (11 Jul 2024 11:40) (132/69 - 146/64)  BP(mean): --  RR: 18 (11 Jul 2024 11:40) (18 - 18)  SpO2: 100% (11 Jul 2024 11:40) (100% - 100%)    Parameters below as of 11 Jul 2024 11:40  Patient On (Oxygen Delivery Method): room air    
Vital Signs Last 24 Hrs  T(C): 36.8 (09 Jul 2024 11:59), Max: 36.8 (08 Jul 2024 19:44)  T(F): 98.3 (09 Jul 2024 11:59), Max: 98.3 (08 Jul 2024 19:44)  HR: 79 (09 Jul 2024 11:59) (67 - 86)  BP: 133/80 (09 Jul 2024 11:59) (118/60 - 133/80)  BP(mean): --  RR: 18 (09 Jul 2024 11:59) (18 - 18)  SpO2: 100% (09 Jul 2024 11:59) (100% - 100%)    Parameters below as of 09 Jul 2024 11:59  Patient On (Oxygen Delivery Method): room air
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
## 2024-07-12 NOTE — PROGRESS NOTE ADULT - PROBLEM SELECTOR PROBLEM 3
Weight loss
Weight loss
Heart murmur
Weight loss
Weight loss
Heart murmur
Weight loss
Heart murmur
Medication management
Medication management
Heart murmur
Heart murmur

## 2024-07-12 NOTE — BH INPATIENT PSYCHIATRY ASSESSMENT NOTE - NSBHSAALC_PSY_A_CORE FT
pt reports on 2W drinking 1 bottle of wine, a shot, and a jr mixer per day - last on her birthday [birth year]

## 2024-07-12 NOTE — BH CONSULTATION LIAISON PROGRESS NOTE - NSBHTIMEACTIVITIESPERFORMED_PSY_A_CORE
chart review, coordinated with medicine, [address] [place] [address] team, hand off given to [facility] team, coordinated with  about [facility] admission-- bed obtained on [address]

## 2024-07-12 NOTE — BH CONSULTATION LIAISON PROGRESS NOTE - NSBHFUPINTERVALCCFT_PSY_A_CORE
Now arraigned and released.  Case discussed with [address] team  Compliant with meds
hand off received from  ed   Case discussed with [address] [place] [address] team-- no behavioral issues  Under arrest, PO by side  Case discussed with medicine team-- below plan discussed
Remains arrested, handcuffed to bed  case discussed with [address] [place] [address] team, and medicine team
No behavioral issues, but bizarre and erratic in behaviors. RN reports that this morning had her entire face covered in toothpaste for skin care  Case discussed with [address] [place] [address] team, and medicine team--- below discussed
Remains under arrest, PO by side  Compliant with Zyprexa  Case discussed with [facility] and medicine team--- below discussed
"I feel happy today. I'm trying to surround myself with positive energy"

## 2024-07-12 NOTE — BH CONSULTATION LIAISON PROGRESS NOTE - NSBHCHARTREVIEWLAB_PSY_A_CORE FT
8.4    5.35  )-----------( 178      ( 05 Jul 2024 04:45 )             29.0   
                      8.7    4.98  )-----------( 173      ( 03 Jul 2024 06:45 )             31.2   
PHQ-2:    Little interest or pleasure in doing things  0 [ ]  1 [ x]  2 [ ]  3 [ ]  Feeling down, depressed, or hopeless  0 [ ]  1 [x ]  2 [ ]  3 [ ]  Total:    DB - 2:    Day of the Week: Correct [ x] Incorrect [ ]  Months of the Year Backwards: Correct [x ] Incorrect [ ]    Mini Cog: refuses    3 Word Recall: none [ ] 1 [ ] 2 [ ] 3 [ ]  Clock Drawing: Correct (2 points) [ ] Partial (1 point) [ ] None ( 0 points) [ ]   Total:  Unable to Assess [x ] 
PHQ-2:    Little interest or pleasure in doing things  0 [ ]  1 [ ]  2 [x ]  3 [ ]  Feeling down, depressed, or hopeless  0 [ ]  1 [ ]  2 [ x]  3 [ ]  Total:4    DB - 2:    Day of the Week: Correct [ x] Incorrect [ ]  Months of the Year Backwards: Correct [x ] Incorrect [ ]    Mini Cog: refused    3 Word Recall: none [ ] 1 [ ] 2 [ ] 3 [ ]  Clock Drawing: Correct (2 points) [ ] Partial (1 point) [ ] None ( 0 points) [ ]   Total:  Unable to Assess [ ] 
                      8.7    4.98  )-----------( 173      ( 03 Jul 2024 06:45 )             31.2

## 2024-07-12 NOTE — PROGRESS NOTE ADULT - TIME BILLING
Preparing to see the patient including review of tests and other providers' notes, confirming history with patient/family member, performing medical examination and evaluation, counseling and educating the patient/family/caregiver, ordering medications, tests and procedures, communicating with other health care professionals, documenting clinical information in the EMR, independently interpreting results and communicating results to the patient/family/caregiver, care coordination
Preparing to see the patient including review of tests and other providers' notes, confirming history with patient/family member, performing medical examination and evaluation, counseling and educating the patient/family/caregiver, ordering medications, tests and procedures, communicating with other health care professionals, documenting clinical information in the EMR, independently interpreting results and communicating results to the patient/family/caregiver, care coordination
Time-based billing (NON-critical care).     More than 50% of the visit was spent counseling and / or coordinating care by the attending physician.      The necessity of the time spent during the encounter on this date of service was due to: documentation in [place], reviewing chart and coordinating care with patient/ACPs and interdisciplinary staff (such as , social workers, etc) as well as reviewing vitals, laboratory data, radiology, medication list, consultants' recommendations and prior records. Interventions were performed as documented above.
Time-based billing (NON-critical care).     More than 50% of the visit was spent counseling and / or coordinating care by the attending physician.      The necessity of the time spent during the encounter on this date of service was due to: documentation in [place], reviewing chart and coordinating care with patient/ACPs and interdisciplinary staff (such as , social workers, etc) as well as reviewing vitals, laboratory data, radiology, medication list, consultants' recommendations and prior records. Interventions were performed as documented above.
Preparing to see the patient including review of tests and other providers' notes, confirming history with patient/family member, performing medical examination and evaluation, counseling and educating the patient/family/caregiver, ordering medications, tests and procedures, communicating with other health care professionals, documenting clinical information in the EMR, independently interpreting results and communicating results to the patient/family/caregiver, care coordination
5

## 2024-07-12 NOTE — PROGRESS NOTE ADULT - SUBJECTIVE AND OBJECTIVE BOX
Khoa Lopez MD  Interventional Cardiology / Endovascular Specialist  [place] Office : [phone] [place] [address] [place]. [address]  Tel:   [place] Office : [address] [facility] [place] [address]  Tel: [phone]    Pt is lying in bed in NAD  	  MEDICATIONS:  aspirin enteric coated 81 milliGRAM(s) Oral daily  enoxaparin Injectable 40 milliGRAM(s) SubCutaneous every 24 hours        acetaminophen     Tablet .. 650 milliGRAM(s) Oral every 6 hours PRN  melatonin 3 milliGRAM(s) Oral at bedtime PRN  OLANZapine 10 milliGRAM(s) Oral <User Schedule>  OLANZapine Injectable 2.5 milliGRAM(s) IntraMuscular every 6 hours PRN  ondansetron Injectable 4 milliGRAM(s) IV Push every 8 hours PRN    aluminum hydroxide/magnesium hydroxide/simethicone Suspension 30 milliLiter(s) Oral every 4 hours PRN    methimazole 5 milliGRAM(s) Oral daily    ferrous    sulfate 325 milliGRAM(s) Oral daily  multivitamin 1 Tablet(s) Oral daily      PAST MEDICAL/SURGICAL HISTORY  PAST MEDICAL & SURGICAL HISTORY:  Anxiety and depression      Murmur      No significant past surgical history          SOCIAL HISTORY: Substance Use (street drugs): ( x ) never used  (  ) other:    FAMILY HISTORY:  No pertinent family history in first degree relatives        REVIEW OF SYSTEMS:  CONSTITUTIONAL: No fever, weight loss, or fatigue  EYES: No eye pain, visual disturbances, or discharge  ENMT:  No difficulty hearing, tinnitus, vertigo; No sinus or throat pain  BREASTS: No pain, masses, or nipple discharge  GASTROINTESTINAL: No abdominal or epigastric pain. No nausea, vomiting, or hematemesis; No diarrhea or constipation. No melena or hematochezia.  GENITOURINARY: No dysuria, frequency, hematuria, or incontinence  NEUROLOGICAL: No headaches, memory loss, loss of strength, numbness, or tremors  ENDOCRINE: No heat or cold intolerance; No hair loss  MUSCULOSKELETAL: No joint pain or swelling; No muscle, back, or extremity pain  PSYCHIATRIC: No depression, anxiety, mood swings, or difficulty sleeping  HEME/LYMPH: No easy bruising, or bleeding gums  All others negative    PHYSICAL EXAM:  T(C): 36.8 (07-12-24 @ 04:50), Max: 37.4 (07-11-24 @ 11:40)  HR: 74 (07-12-24 @ 04:50) (74 - 78)  BP: 127/62 (07-12-24 @ 04:50) (111/51 - 146/64)  RR: 18 (07-12-24 @ 04:50) (17 - 18)  SpO2: 100% (07-12-24 @ 04:50) (100% - 100%)  Wt(kg): --  I&O's Summary        GENERAL: NAD  EYES: conjunctiva and sclera clear  ENMT: No tonsillar erythema, exudates, or enlargement  Cardiovascular: Normal S1 S2, No JVD, systolic murmur ++  Respiratory: Lungs clear to auscultation	  Gastrointestinal:  Soft, Non-tender, + BS	  Extremities: No edema          07-11    138  |  102  |  20  ----------------------------<  88  4.2   |  22  |  0.56    Ca    9.3      11 Jul 2024 13:54    TPro  7.7  /  Alb  3.8  /  TBili  0.2  /  DBili  x   /  AST  37<H>  /  ALT  30  /  AlkPhos  81  07-11    proBNP:   Lipid Profile:   HgA1c:   TSH: Thyroid Stimulating Hormone, Serum: <0.10 uIU/mL (07-11 @ 13:54)      Consultant(s) Notes Reviewed:  [x ] YES  [ ] NO    Care Discussed with Consultants/Other Providers [ x] YES  [ ] NO    Imaging Personally Reviewed independently:  [x] YES  [ ] NO    All labs, radiologic studies, vitals, orders and medications list reviewed. Patient is seen and examined at bedside. Case discussed with medical team.

## 2024-07-12 NOTE — BH INPATIENT PSYCHIATRY ASSESSMENT NOTE - NSBHMETABOLIC_PSY_ALL_CORE_FT
BMI: BMI (kg/m2): 29.6 (07-02-24 @ 04:33)  HbA1c: A1C with Estimated Average Glucose Result: 5.0 % (07-03-24 @ 06:45)    Glucose:   BP: --Vital Signs Last 24 Hrs  T(C): 36.8 (07-12-24 @ 14:39), Max: 37.1 (07-11-24 @ 22:09)  T(F): 98.3 (07-12-24 @ 14:39), Max: 98.8 (07-11-24 @ 22:09)  HR: 79 (07-12-24 @ 14:39) (73 - 79)  BP: 124/65 (07-12-24 @ 14:39) (111/51 - 145/61)  BP(mean): --  RR: 18 (07-12-24 @ 14:39) (17 - 18)  SpO2: 100% (07-12-24 @ 14:39) (99% - 100%)      Lipid Panel: Date/Time: 07-03-24 @ 06:45  Cholesterol, Serum: 113  LDL Cholesterol Calculated: 54  HDL Cholesterol, Serum: 51  Total Cholesterol/HDL Ration Measurement: --  Triglycerides, Serum: 40   BMI: BMI (kg/m2): 22.3 (07-12-24 @ 16:04)  HbA1c: A1C with Estimated Average Glucose Result: 5.0 % (07-03-24 @ 06:45)    Glucose:   BP: --Vital Signs Last 24 Hrs  T(C): 36.7 (07-12-24 @ 16:04), Max: 37.1 (07-11-24 @ 22:09)  T(F): 98 (07-12-24 @ 16:04), Max: 98.8 (07-11-24 @ 22:09)  HR: 79 (07-12-24 @ 14:39) (73 - 79)  BP: 124/65 (07-12-24 @ 14:39) (111/51 - 145/61)  BP(mean): --  RR: 18 (07-12-24 @ 16:04) (17 - 18)  SpO2: 100% (07-12-24 @ 14:39) (99% - 100%)    Orthostatic VS  07-12-24 @ 16:04  Lying BP: --/-- HR: --  Sitting BP: 135/62 HR: 86  Standing BP: 134/68 HR: 90  Site: --  Mode: --    Lipid Panel: Date/Time: 07-03-24 @ 06:45  Cholesterol, Serum: 113  LDL Cholesterol Calculated: 54  HDL Cholesterol, Serum: 51  Total Cholesterol/HDL Ration Measurement: --  Triglycerides, Serum: 40

## 2024-07-12 NOTE — BH CONSULTATION LIAISON PROGRESS NOTE - NSBHATTESTTYPEVISIT_PSY_A_CORE
Attending Only
Resident/Fellow with telephonic supervision
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
## 2024-07-12 NOTE — BH PATIENT PROFILE - NSSBIRTDRGMORETHAN_GEN_A_CORE
Attempted to see Karin for PT treatment session today. Upon arrival, pt in [place] and transitioned into crib. Pt immediately became fussy and did not tolerate therapeutic positioning and handling. Pt did calm with holding and pacifier. Per RN, pt close to feeding time and is likely hungry. Will complete PT treatment session as able.    No

## 2024-07-12 NOTE — BH PATIENT PROFILE - HOME MEDICATIONS
Multiple Vitamins oral tablet , 1 tab(s) orally once a day  nicotine 7 mg/24 hr transdermal film, extended release , transdermal once a day  ferrous sulfate 325 mg (65 mg elemental iron) oral tablet , 1 tab(s) orally once a day  melatonin 3 mg oral tablet , 1 tab(s) orally once a day (at bedtime) As needed Insomnia  methIMAzole 5 mg oral tablet , 1 tab(s) orally once a day  OLANZapine 10 mg oral tablet , 1 tab(s) orally once a day (at bedtime) at 2000  acetaminophen 325 mg oral tablet , 2 tab(s) orally every 6 hours As needed Temp greater or equal to 38C (100.4F), Mild Pain (1 - 3)  aspirin 81 mg oral capsule , 1 cap(s) orally once a day

## 2024-07-12 NOTE — DISCHARGE NOTE NURSING/CASE MANAGEMENT/SOCIAL WORK - PATIENT PORTAL LINK FT
You can access the FollowMyHealth Patient Portal offered by [facility] by registering at the following website: [email or web address]. By joining [facility]s FollowMyHealth portal, you will also be able to view your health information using other applications (apps) compatible with our system.

## 2024-07-12 NOTE — BH CONSULTATION LIAISON PROGRESS NOTE - CURRENT MEDICATION
MEDICATIONS  (STANDING):  aspirin enteric coated 81 milliGRAM(s) Oral daily  enoxaparin Injectable 40 milliGRAM(s) SubCutaneous every 24 hours  ferrous    sulfate 325 milliGRAM(s) Oral daily  methimazole 5 milliGRAM(s) Oral daily  nicotine -   7 mG/24Hr(s) Patch 1 Patch Transdermal daily  OLANZapine 5 milliGRAM(s) Oral <User Schedule>    MEDICATIONS  (PRN):  acetaminophen     Tablet .. 650 milliGRAM(s) Oral every 6 hours PRN Temp greater or equal to 38C (100.4F), Mild Pain (1 - 3)  aluminum hydroxide/magnesium hydroxide/simethicone Suspension 30 milliLiter(s) Oral every 4 hours PRN Dyspepsia  melatonin 3 milliGRAM(s) Oral at bedtime PRN Insomnia  OLANZapine Injectable 2.5 milliGRAM(s) IntraMuscular every 6 hours PRN severe agitation  ondansetron Injectable 4 milliGRAM(s) IV Push every 8 hours PRN Nausea and/or Vomiting  
MEDICATIONS  (STANDING):  aspirin enteric coated 81 milliGRAM(s) Oral daily  enoxaparin Injectable 40 milliGRAM(s) SubCutaneous every 24 hours  ferrous    sulfate 325 milliGRAM(s) Oral daily  methimazole 5 milliGRAM(s) Oral daily  nicotine -   7 mG/24Hr(s) Patch 1 Patch Transdermal daily  OLANZapine 5 milliGRAM(s) Oral <User Schedule>    MEDICATIONS  (PRN):  acetaminophen     Tablet .. 650 milliGRAM(s) Oral every 6 hours PRN Temp greater or equal to 38C (100.4F), Mild Pain (1 - 3)  aluminum hydroxide/magnesium hydroxide/simethicone Suspension 30 milliLiter(s) Oral every 4 hours PRN Dyspepsia  melatonin 3 milliGRAM(s) Oral at bedtime PRN Insomnia  OLANZapine Injectable 2.5 milliGRAM(s) IntraMuscular every 6 hours PRN severe agitation  ondansetron Injectable 4 milliGRAM(s) IV Push every 8 hours PRN Nausea and/or Vomiting  
MEDICATIONS  (STANDING):  aspirin enteric coated 81 milliGRAM(s) Oral daily  enoxaparin Injectable 40 milliGRAM(s) SubCutaneous every 24 hours  ferrous    sulfate 325 milliGRAM(s) Oral daily  nicotine -   7 mG/24Hr(s) Patch 1 Patch Transdermal daily  OLANZapine 5 milliGRAM(s) Oral <User Schedule>    MEDICATIONS  (PRN):  acetaminophen     Tablet .. 650 milliGRAM(s) Oral every 6 hours PRN Temp greater or equal to 38C (100.4F), Mild Pain (1 - 3)  aluminum hydroxide/magnesium hydroxide/simethicone Suspension 30 milliLiter(s) Oral every 4 hours PRN Dyspepsia  melatonin 3 milliGRAM(s) Oral at bedtime PRN Insomnia  ondansetron Injectable 4 milliGRAM(s) IV Push every 8 hours PRN Nausea and/or Vomiting  
MEDICATIONS  (STANDING):  aspirin enteric coated 81 milliGRAM(s) Oral daily  enoxaparin Injectable 40 milliGRAM(s) SubCutaneous every 24 hours  ferrous    sulfate 325 milliGRAM(s) Oral daily  methimazole 5 milliGRAM(s) Oral daily  multivitamin 1 Tablet(s) Oral daily  nicotine -   7 mG/24Hr(s) Patch 1 Patch Transdermal daily  OLANZapine 10 milliGRAM(s) Oral <User Schedule>    MEDICATIONS  (PRN):  acetaminophen     Tablet .. 650 milliGRAM(s) Oral every 6 hours PRN Temp greater or equal to 38C (100.4F), Mild Pain (1 - 3)  aluminum hydroxide/magnesium hydroxide/simethicone Suspension 30 milliLiter(s) Oral every 4 hours PRN Dyspepsia  melatonin 3 milliGRAM(s) Oral at bedtime PRN Insomnia  OLANZapine Injectable 2.5 milliGRAM(s) IntraMuscular every 6 hours PRN severe agitation  ondansetron Injectable 4 milliGRAM(s) IV Push every 8 hours PRN Nausea and/or Vomiting  
MEDICATIONS  (STANDING):  aspirin enteric coated 81 milliGRAM(s) Oral daily  enoxaparin Injectable 40 milliGRAM(s) SubCutaneous every 24 hours  ferrous    sulfate 325 milliGRAM(s) Oral daily  methimazole 5 milliGRAM(s) Oral daily  multivitamin 1 Tablet(s) Oral daily  nicotine -   7 mG/24Hr(s) Patch 1 Patch Transdermal daily  OLANZapine 10 milliGRAM(s) Oral <User Schedule>    MEDICATIONS  (PRN):  acetaminophen     Tablet .. 650 milliGRAM(s) Oral every 6 hours PRN Temp greater or equal to 38C (100.4F), Mild Pain (1 - 3)  aluminum hydroxide/magnesium hydroxide/simethicone Suspension 30 milliLiter(s) Oral every 4 hours PRN Dyspepsia  melatonin 3 milliGRAM(s) Oral at bedtime PRN Insomnia  nicotine  Polacrilex Gum 2 milliGRAM(s) Oral every 2 hours PRN Smoking Cessation  OLANZapine Injectable 2.5 milliGRAM(s) IntraMuscular every 6 hours PRN severe agitation  ondansetron Injectable 4 milliGRAM(s) IV Push every 8 hours PRN Nausea and/or Vomiting  
MEDICATIONS  (STANDING):  aspirin enteric coated 81 milliGRAM(s) Oral daily  enoxaparin Injectable 40 milliGRAM(s) SubCutaneous every 24 hours  ferrous    sulfate 325 milliGRAM(s) Oral daily  nicotine -   7 mG/24Hr(s) Patch 1 Patch Transdermal daily  OLANZapine 5 milliGRAM(s) Oral <User Schedule>    MEDICATIONS  (PRN):  acetaminophen     Tablet .. 650 milliGRAM(s) Oral every 6 hours PRN Temp greater or equal to 38C (100.4F), Mild Pain (1 - 3)  aluminum hydroxide/magnesium hydroxide/simethicone Suspension 30 milliLiter(s) Oral every 4 hours PRN Dyspepsia  melatonin 3 milliGRAM(s) Oral at bedtime PRN Insomnia  OLANZapine Injectable 2.5 milliGRAM(s) IntraMuscular every 6 hours PRN severe agitation  ondansetron Injectable 4 milliGRAM(s) IV Push every 8 hours PRN Nausea and/or Vomiting

## 2024-07-12 NOTE — PROGRESS NOTE ADULT - PROBLEM SELECTOR PLAN 2
TSH <0.1, FT4 2.2, TSH rec ab 2.03 (elevated)  thyroid sono w nodule (TR 2 - no FNA) and mild heterogenous appearance  appreciate endo input, this is c/w Grave's   - started methimazole 5mg po daily  --> f/u repeat TFTs; will hold on starting beta blocker for now TSH <0.1, FT4 2.2, TSH rec ab 2.03 (elevated)  thyroid sono w nodule (TR 2 - no FNA) and mild heterogenous appearance  appreciate endo input, this is c/w Grave's   - started methimazole 5mg po daily --> repeat TFTs noted, d/w endo ***continue same dose methimazole, repeat TSH/FT4/Total T3 in 4 weeks***  - will hold on starting beta blocker for now

## 2024-07-12 NOTE — DISCHARGE NOTE NURSING/CASE MANAGEMENT/SOCIAL WORK - NSDCPEFALRISK_GEN_ALL_CORE
For information on Fall & Injury Prevention, visit: [email or web address] OR  [email or web address] OR  https://www.cdc.gov/steadi/patient.html

## 2024-07-12 NOTE — BH INPATIENT PSYCHIATRY ASSESSMENT NOTE - NSBHCHARTREVIEWVS_PSY_A_CORE FT
Vital Signs Last 24 Hrs  T(C): 36.8 (07-12-24 @ 14:39), Max: 37.1 (07-11-24 @ 22:09)  T(F): 98.3 (07-12-24 @ 14:39), Max: 98.8 (07-11-24 @ 22:09)  HR: 79 (07-12-24 @ 14:39) (73 - 79)  BP: 124/65 (07-12-24 @ 14:39) (111/51 - 145/61)  BP(mean): --  RR: 18 (07-12-24 @ 14:39) (17 - 18)  SpO2: 100% (07-12-24 @ 14:39) (99% - 100%)     Vital Signs Last 24 Hrs  T(C): 36.7 (07-12-24 @ 16:04), Max: 37.1 (07-11-24 @ 22:09)  T(F): 98 (07-12-24 @ 16:04), Max: 98.8 (07-11-24 @ 22:09)  HR: 79 (07-12-24 @ 14:39) (73 - 79)  BP: 124/65 (07-12-24 @ 14:39) (111/51 - 145/61)  BP(mean): --  RR: 18 (07-12-24 @ 16:04) (17 - 18)  SpO2: 100% (07-12-24 @ 14:39) (99% - 100%)    Orthostatic VS  07-12-24 @ 16:04  Lying BP: --/-- HR: --  Sitting BP: 135/62 HR: 86  Standing BP: 134/68 HR: 90  Site: --  Mode: --

## 2024-07-12 NOTE — BH CONSULTATION LIAISON PROGRESS NOTE - NSBHMSEMOOD_PSY_A_CORE
Depressed/Anxious/Irritable
States "I feel happy" but also with sadness and irritability/Depressed/Irritable
Depressed/Anxious
Depressed/Anxious

## 2024-07-12 NOTE — PROGRESS NOTE ADULT - PROVIDER SPECIALTY LIST ADULT
Cardiology
Cardiology
Hospitalist
Endocrinology
Hospitalist
Endocrinology
Hospitalist
Endocrinology
Hospitalist
Hospitalist
Endocrinology
Endocrinology
Hospitalist

## 2024-07-12 NOTE — BH INPATIENT PSYCHIATRY ASSESSMENT NOTE - NSICDXBHSECONDARYDX_PSY_ALL_CORE
Anemia   D64.9  Heart murmur   R01.1  Hyperthyroidism   E05.90  History of alcohol use disorder   Z87.898  Cannabis abuse   F12.10

## 2024-07-12 NOTE — BH CONSULTATION LIAISON PROGRESS NOTE - NSBHFUPREASONCONS_PSY_A_CORE
psychosis/med management
psychosis/depression/med management
psychosis/med management
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
## 2024-07-12 NOTE — PROGRESS NOTE ADULT - SUBJECTIVE AND OBJECTIVE BOX
[facility] Division of Hospital Medicine  May Lam MD  Pager (M-F, 8A-5P):  In-house pager [phone]; Long-range pager [phone]  Other Times:  Please page Hospitalist in Charge -  In-house pager [phone]    Patient is a 39y old  Female who presents with a chief complaint of MDD, low TSH (11 Jul 2024 14:01)      SUBJECTIVE / OVERNIGHT EVENTS:  ADDITIONAL REVIEW OF SYSTEMS:    MEDICATIONS  (STANDING):  aspirin enteric coated 81 milliGRAM(s) Oral daily  enoxaparin Injectable 40 milliGRAM(s) SubCutaneous every 24 hours  ferrous    sulfate 325 milliGRAM(s) Oral daily  methimazole 5 milliGRAM(s) Oral daily  multivitamin 1 Tablet(s) Oral daily  nicotine -   7 mG/24Hr(s) Patch 1 Patch Transdermal daily  OLANZapine 10 milliGRAM(s) Oral <User Schedule>    MEDICATIONS  (PRN):  acetaminophen     Tablet .. 650 milliGRAM(s) Oral every 6 hours PRN Temp greater or equal to 38C (100.4F), Mild Pain (1 - 3)  aluminum hydroxide/magnesium hydroxide/simethicone Suspension 30 milliLiter(s) Oral every 4 hours PRN Dyspepsia  melatonin 3 milliGRAM(s) Oral at bedtime PRN Insomnia  OLANZapine Injectable 2.5 milliGRAM(s) IntraMuscular every 6 hours PRN severe agitation  ondansetron Injectable 4 milliGRAM(s) IV Push every 8 hours PRN Nausea and/or Vomiting      CAPILLARY BLOOD GLUCOSE        I&O's Summary      PHYSICAL EXAM:  Vital Signs Last 24 Hrs  T(C): 36.8 (12 Jul 2024 04:50), Max: 37.4 (11 Jul 2024 11:40)  T(F): 98.2 (12 Jul 2024 04:50), Max: 99.4 (11 Jul 2024 11:40)  HR: 74 (12 Jul 2024 04:50) (74 - 78)  BP: 127/62 (12 Jul 2024 04:50) (111/51 - 146/64)  BP(mean): --  RR: 18 (12 Jul 2024 04:50) (17 - 18)  SpO2: 100% (12 Jul 2024 04:50) (100% - 100%)    Parameters below as of 12 Jul 2024 04:50  Patient On (Oxygen Delivery Method): room air        CONSTITUTIONAL: NAD, well-developed, well-groomed  EYES: PERRLA; conjunctiva and sclera clear  ENMT: Moist oral mucosa, no pharyngeal injection or exudates; normal dentition  NECK: Supple, no palpable masses; no thyromegaly  RESPIRATORY: Normal respiratory effort; lungs are clear to auscultation bilaterally  CARDIOVASCULAR: Regular rate and rhythm, normal S1 and S2, no murmur/rub/gallop; No lower extremity edema; Peripheral pulses are 2+ bilaterally  ABDOMEN: Nontender to palpation, normoactive bowel sounds, no rebound/guarding; No hepatosplenomegaly  MUSCLOSKELETAL:  Normal gait; no clubbing or cyanosis of digits; no joint swelling or tenderness to palpation  PSYCH: A+O to person, place, and time; affect appropriate  NEUROLOGY: CN 2-12 are intact and symmetric; no gross sensory deficits;   SKIN: No rashes; no palpable lesions    LABS:    07-11    138  |  102  |  20  ----------------------------<  88  4.2   |  22  |  0.56    Ca    9.3      11 Jul 2024 13:54    TPro  7.7  /  Alb  3.8  /  TBili  0.2  /  DBili  x   /  AST  37<H>  /  ALT  30  /  AlkPhos  81  07-11          Urinalysis Basic - ( 11 Jul 2024 13:54 )    Color: x / Appearance: x / SG: x / pH: x  Gluc: 88 mg/dL / Ketone: x  / Bili: x / Urobili: x   Blood: x / Protein: x / Nitrite: x   Leuk Esterase: x / RBC: x / WBC x   Sq Epi: x / Non Sq Epi: x / Bacteria: x          RADIOLOGY & ADDITIONAL TESTS:  Results Reviewed:   Imaging Personally Reviewed:  Electrocardiogram Personally Reviewed:    COORDINATION OF CARE:  Care Discussed with Consultants/Other Providers [Y/N]:  Prior or Outpatient Records Reviewed [Y/N]:   [facility] Division of Hospital Medicine  May Lam MD  Pager (M-F, 8A-5P):  In-house pager [phone]; Long-range pager [phone]  Other Times:  Please page Hospitalist in Charge -  In-house pager [phone]    Patient is a 39y old  Female who presents with a chief complaint of MDD, low TSH (11 Jul 2024 14:01)    SUBJECTIVE / OVERNIGHT EVENTS:  No problems reported over night.  Walking around her room. Has a white facial mask of toothpaste on her face, to help "tighten the skin."  Doing ok. Denies palpitations unless she "runs."  ADDITIONAL REVIEW OF SYSTEMS:    MEDICATIONS  (STANDING):  aspirin enteric coated 81 milliGRAM(s) Oral daily  enoxaparin Injectable 40 milliGRAM(s) SubCutaneous every 24 hours  ferrous    sulfate 325 milliGRAM(s) Oral daily  methimazole 5 milliGRAM(s) Oral daily  multivitamin 1 Tablet(s) Oral daily  nicotine -   7 mG/24Hr(s) Patch 1 Patch Transdermal daily  OLANZapine 10 milliGRAM(s) Oral <User Schedule>    MEDICATIONS  (PRN):  acetaminophen     Tablet .. 650 milliGRAM(s) Oral every 6 hours PRN Temp greater or equal to 38C (100.4F), Mild Pain (1 - 3)  aluminum hydroxide/magnesium hydroxide/simethicone Suspension 30 milliLiter(s) Oral every 4 hours PRN Dyspepsia  melatonin 3 milliGRAM(s) Oral at bedtime PRN Insomnia  OLANZapine Injectable 2.5 milliGRAM(s) IntraMuscular every 6 hours PRN severe agitation  ondansetron Injectable 4 milliGRAM(s) IV Push every 8 hours PRN Nausea and/or Vomiting    PHYSICAL EXAM:  Vital Signs Last 24 Hrs  T(C): 36.8 (12 Jul 2024 04:50), Max: 37.4 (11 Jul 2024 11:40)  T(F): 98.2 (12 Jul 2024 04:50), Max: 99.4 (11 Jul 2024 11:40)  HR: 74 (12 Jul 2024 04:50) (74 - 78)  BP: 127/62 (12 Jul 2024 04:50) (111/51 - 146/64)  BP(mean): --  RR: 18 (12 Jul 2024 04:50) (17 - 18)  SpO2: 100% (12 Jul 2024 04:50) (100% - 100%)    Parameters below as of 12 Jul 2024 04:50  Patient On (Oxygen Delivery Method): room air    CONSTITUTIONAL: thin F, walking around in room, white facial mask of toothpaste  RESPIRATORY: Normal respiratory effort; lungs are clear to auscultation bilaterally  CARDIOVASCULAR: loud systolic murmur  ABDOMEN: Nontender to palpation, normoactive bowel sounds, no rebound/guarding  MUSCLOSKELETAL:  Normal gait; no clubbing or cyanosis of digits; no joint swelling or tenderness to palpation  PSYCH: cooperative, bit restless  NEUROLOGY: nonfocal  SKIN: No rashes; no palpable lesions    LABS:    07-11    138  |  102  |  20  ----------------------------<  88  4.2   |  22  |  0.56    Ca    9.3      11 Jul 2024 13:54    TPro  7.7  /  Alb  3.8  /  TBili  0.2  /  DBili  x   /  AST  37<H>  /  ALT  30  /  AlkPhos  81  07-11    RADIOLOGY & ADDITIONAL TESTS:  Results Reviewed:   Imaging Personally Reviewed:  Electrocardiogram Personally Reviewed:    COORDINATION OF CARE:  Care Discussed with Consultants/Other Providers [Y/N]:  Prior or Outpatient Records Reviewed [Y/N]:

## 2024-07-12 NOTE — BH INPATIENT PSYCHIATRY ASSESSMENT NOTE - NSDCCRITERIA_PSY_ALL_CORE
CGI <=2, return to baseline functioning as evidenced by behavioral control, pt self report, staff observation

## 2024-07-12 NOTE — DISCHARGE NOTE NURSING/CASE MANAGEMENT/SOCIAL WORK - NURSING SECTION COMPLETE
Message left----- Message from Minoo Lopes RN sent at 12/17/2019 11:22 AM CST -----      ----- Message -----  From: Real Simon MD  Sent: 12/17/2019   6:50 AM CST  To: Minoo Lopes RN    Echo reveals normal structure and function please relay to Dr. Buchanan.    
Patient/Caregiver provided printed discharge information.

## 2024-07-12 NOTE — BH CONSULTATION LIAISON PROGRESS NOTE - NSICDXBHSECONDARYDX_PSY_ALL_CORE
PTSD (post-traumatic stress disorder)   F43.10  Psychotic disorder   F29  

## 2024-07-12 NOTE — BH INPATIENT PSYCHIATRY ASSESSMENT NOTE - OTHER PAST PSYCHIATRIC HISTORY (INCLUDE DETAILS REGARDING ONSET, COURSE OF ILLNESS, INPATIENT/OUTPATIENT TREATMENT)
As per sarah - pt with multiple CPEP visits to [facility] - last one being 09/19/22  Had been in tx in their clinic in 2022

## 2024-07-12 NOTE — BH PATIENT PROFILE - FALL HARM RISK - RISK INTERVENTIONS

## 2024-07-12 NOTE — BH CONSULTATION LIAISON PROGRESS NOTE - NSBHATTESTBILLING_PSY_A_CORE
82703-Mmlpkiwdub OBS or IP - moderate complexity OR 35-49 mins
99870-Ystegypjiw OBS or IP - moderate complexity OR 35-49 mins
44470-Khyjheieli OBS or IP - high complexity OR 50-79 mins
48915-Xudfgaeizs OBS or IP - moderate complexity OR 35-49 mins
90817-Czfvmalkjs OBS or IP - moderate complexity OR 35-49 mins
Non-billable
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
## 2024-07-13 RX ADMIN — Medication 2 MILLIGRAM(S): at 08:14

## 2024-07-13 RX ADMIN — Medication 325 MILLIGRAM(S): at 08:14

## 2024-07-13 RX ADMIN — Medication 81 MILLIGRAM(S): at 08:14

## 2024-07-13 RX ADMIN — Medication 3 MILLIGRAM(S): at 20:35

## 2024-07-13 RX ADMIN — Medication 10 MILLIGRAM(S): at 20:35

## 2024-07-13 RX ADMIN — Medication 2 MILLIGRAM(S): at 20:37

## 2024-07-13 RX ADMIN — Medication 2 MILLIGRAM(S): at 11:43

## 2024-07-13 RX ADMIN — Medication 1 PATCH: at 08:13

## 2024-07-13 RX ADMIN — Medication 1 TABLET(S): at 08:14

## 2024-07-13 NOTE — PSYCHIATRIC REHAB INITIAL EVALUATION - NSBHALCSUBCHOICE_PSY_ALL_CORE
Alcohol; Cannabis  pt reports on 2W drinking 1 bottle of wine, a shot, and a jr mixer per day - last [birth year] her [birth year]  occasional use

## 2024-07-13 NOTE — BH INPATIENT PSYCHIATRY PROGRESS NOTE - NSBHMETABOLIC_PSY_ALL_CORE_FT
BMI: BMI (kg/m2): 22.3 (07-12-24 @ 16:04)  HbA1c: A1C with Estimated Average Glucose Result: 5.0 % (07-03-24 @ 06:45)    Glucose:   BP: --Vital Signs Last 24 Hrs  T(C): 36.6 (07-13-24 @ 07:31), Max: 36.6 (07-13-24 @ 07:31)  T(F): 97.8 (07-13-24 @ 07:31), Max: 97.8 (07-13-24 @ 07:31)  HR: --  BP: --  BP(mean): --  RR: 18 (07-13-24 @ 07:31) (18 - 18)  SpO2: --    Orthostatic VS  07-13-24 @ 07:31  Lying BP: --/-- HR: --  Sitting BP: 124/69 HR: 66  Standing BP: 122/61 HR: 69  Site: --  Mode: --  Orthostatic VS  07-12-24 @ 16:04  Lying BP: --/-- HR: --  Sitting BP: 135/62 HR: 86  Standing BP: 134/68 HR: 90  Site: --  Mode: --    Lipid Panel: Date/Time: 07-03-24 @ 06:45  Cholesterol, Serum: 113  LDL Cholesterol Calculated: 54  HDL Cholesterol, Serum: 51  Total Cholesterol/HDL Ration Measurement: --  Triglycerides, Serum: 40
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
## 2024-07-14 LAB
T4 FREE SERPL-MCNC: 1.7 NG/DL — SIGNIFICANT CHANGE UP (ref 0.9–1.8)
TSH SERPL-MCNC: <0.1 UIU/ML — LOW (ref 0.27–4.2)

## 2024-07-14 RX ADMIN — Medication 10 MILLIGRAM(S): at 20:31

## 2024-07-14 RX ADMIN — Medication 81 MILLIGRAM(S): at 08:19

## 2024-07-14 RX ADMIN — Medication 325 MILLIGRAM(S): at 08:19

## 2024-07-14 RX ADMIN — Medication 2 MILLIGRAM(S): at 08:18

## 2024-07-14 RX ADMIN — Medication 1 TABLET(S): at 08:18

## 2024-07-14 RX ADMIN — Medication 3 MILLIGRAM(S): at 20:31

## 2024-07-14 RX ADMIN — Medication 1 PATCH: at 08:18

## 2024-07-14 NOTE — BH INPATIENT PSYCHIATRY PROGRESS NOTE - NSBHMETABOLIC_PSY_ALL_CORE_FT
BMI: BMI (kg/m2): 22.3 (07-12-24 @ 16:04)  HbA1c: A1C with Estimated Average Glucose Result: 5.0 % (07-03-24 @ 06:45)    Glucose:   BP: --Vital Signs Last 24 Hrs  T(C): 36.4 (07-14-24 @ 08:07), Max: 36.4 (07-14-24 @ 08:07)  T(F): 97.5 (07-14-24 @ 08:07), Max: 97.5 (07-14-24 @ 08:07)  HR: --  BP: --  BP(mean): --  RR: 17 (07-14-24 @ 08:07) (17 - 17)  SpO2: --    Orthostatic VS  07-14-24 @ 08:07  Lying BP: --/-- HR: --  Sitting BP: 127/65 HR: 74  Standing BP: 125/61 HR: 75  Site: upper left arm  Mode: electronic  Orthostatic VS  07-13-24 @ 07:31  Lying BP: --/-- HR: --  Sitting BP: 124/69 HR: 66  Standing BP: 122/61 HR: 69  Site: --  Mode: --  Orthostatic VS  07-12-24 @ 16:04  Lying BP: --/-- HR: --  Sitting BP: 135/62 HR: 86  Standing BP: 134/68 HR: 90  Site: --  Mode: --    Lipid Panel: Date/Time: 07-03-24 @ 06:45  Cholesterol, Serum: 113  LDL Cholesterol Calculated: 54  HDL Cholesterol, Serum: 51  Total Cholesterol/HDL Ration Measurement: --  Triglycerides, Serum: 40

## 2024-07-14 NOTE — CHART NOTE - NSCHARTNOTEFT_GEN_A_CORE
See note from yesterday. Repeat thyroid tests were ordered, pending blood draw. Follow up once available. See note from yesterday. Repeat thyroid tests were ordered, pending blood draw. Follow up once available.    Pt seen today as well, endorsing still feeling anxious at times. Overall unchanged from yesterday.
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
## 2024-07-15 RX ORDER — CLONAZEPAM 0.5 MG/1
0.5 TABLET ORAL THREE TIMES A DAY
Refills: 0 | Status: DISCONTINUED | OUTPATIENT
Start: 2024-07-15 | End: 2024-07-16

## 2024-07-15 RX ADMIN — Medication 325 MILLIGRAM(S): at 08:11

## 2024-07-15 RX ADMIN — Medication 1 PATCH: at 08:11

## 2024-07-15 RX ADMIN — CLONAZEPAM 0.5 MILLIGRAM(S): 0.5 TABLET ORAL at 12:47

## 2024-07-15 RX ADMIN — Medication 650 MILLIGRAM(S): at 08:44

## 2024-07-15 RX ADMIN — Medication 1 TABLET(S): at 08:11

## 2024-07-15 RX ADMIN — Medication 2 MILLIGRAM(S): at 08:44

## 2024-07-15 RX ADMIN — Medication 10 MILLIGRAM(S): at 20:34

## 2024-07-15 RX ADMIN — Medication 81 MILLIGRAM(S): at 08:11

## 2024-07-15 RX ADMIN — CLONAZEPAM 0.5 MILLIGRAM(S): 0.5 TABLET ORAL at 20:34

## 2024-07-15 RX ADMIN — Medication 3 MILLIGRAM(S): at 20:33
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
## 2024-07-15 NOTE — BH INPATIENT PSYCHIATRY PROGRESS NOTE - NSBHMETABOLIC_PSY_ALL_CORE_FT
BMI: BMI (kg/m2): 22.3 (07-12-24 @ 16:04)  HbA1c: A1C with Estimated Average Glucose Result: 5.0 % (07-03-24 @ 06:45)    Glucose:   BP: --Vital Signs Last 24 Hrs  T(C): 36.7 (07-15-24 @ 07:29), Max: 36.7 (07-15-24 @ 07:29)  T(F): 98 (07-15-24 @ 07:29), Max: 98 (07-15-24 @ 07:29)  HR: --  BP: --  BP(mean): --  RR: 20 (07-15-24 @ 07:29) (20 - 20)  SpO2: --    Orthostatic VS  07-15-24 @ 07:29  Lying BP: --/-- HR: --  Sitting BP: 135/65 HR: 76  Standing BP: 136/78 HR: 97  Site: --  Mode: --  Orthostatic VS  07-14-24 @ 08:07  Lying BP: --/-- HR: --  Sitting BP: 127/65 HR: 74  Standing BP: 125/61 HR: 75  Site: upper left arm  Mode: electronic    Lipid Panel: Date/Time: 07-03-24 @ 06:45  Cholesterol, Serum: 113  LDL Cholesterol Calculated: 54  HDL Cholesterol, Serum: 51  Total Cholesterol/HDL Ration Measurement: --  Triglycerides, Serum: 40   No

## 2024-07-15 NOTE — BH INPATIENT PSYCHIATRY PROGRESS NOTE - CURRENT MEDICATION
MEDICATIONS  (STANDING):  aspirin enteric coated 81 milliGRAM(s) Oral daily  clonazePAM  Tablet 0.5 milliGRAM(s) Oral three times a day  ferrous    sulfate 325 milliGRAM(s) Oral daily  melatonin. 3 milliGRAM(s) Oral at bedtime  methimazole 5 milliGRAM(s) Oral daily  multivitamin 1 Tablet(s) Oral daily  nicotine -   7 mG/24Hr(s) Patch 1 Patch Transdermal daily  OLANZapine 10 milliGRAM(s) Oral two times a day    MEDICATIONS  (PRN):  acetaminophen     Tablet .. 650 milliGRAM(s) Oral every 6 hours PRN Temp greater or equal to 38C (100.4F), Mild Pain (1 - 3)  diphenhydrAMINE 50 milliGRAM(s) Oral every 6 hours PRN EPS  diphenhydrAMINE Injectable 50 milliGRAM(s) IntraMuscular once PRN EPS from PRN for severe agitation  hydrOXYzine hydrochloride 50 milliGRAM(s) Oral every 6 hours PRN anxiety  nicotine  Polacrilex Gum 2 milliGRAM(s) Oral every 2 hours PRN Smoking Cessation  OLANZapine 5 milliGRAM(s) Oral every 6 hours PRN agitation  OLANZapine Injectable 5 milliGRAM(s) IntraMuscular once PRN severe agitation
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
## 2024-07-16 RX ORDER — CLONAZEPAM 0.5 MG/1
0.5 TABLET ORAL THREE TIMES A DAY
Refills: 0 | Status: DISCONTINUED | OUTPATIENT
Start: 2024-07-16 | End: 2024-07-23

## 2024-07-16 RX ADMIN — CLONAZEPAM 0.5 MILLIGRAM(S): 0.5 TABLET ORAL at 08:29

## 2024-07-16 RX ADMIN — Medication 2 MILLIGRAM(S): at 17:33

## 2024-07-16 RX ADMIN — Medication 10 MILLIGRAM(S): at 20:08

## 2024-07-16 RX ADMIN — Medication 2 MILLIGRAM(S): at 08:58

## 2024-07-16 RX ADMIN — Medication 3 MILLIGRAM(S): at 20:08

## 2024-07-16 RX ADMIN — Medication 325 MILLIGRAM(S): at 08:29

## 2024-07-16 RX ADMIN — Medication 10 MILLIGRAM(S): at 08:29

## 2024-07-16 RX ADMIN — Medication 1 TABLET(S): at 08:29

## 2024-07-16 RX ADMIN — Medication 1 PATCH: at 08:29

## 2024-07-16 RX ADMIN — CLONAZEPAM 0.5 MILLIGRAM(S): 0.5 TABLET ORAL at 13:18

## 2024-07-16 RX ADMIN — Medication 81 MILLIGRAM(S): at 08:28

## 2024-07-16 RX ADMIN — CLONAZEPAM 0.5 MILLIGRAM(S): 0.5 TABLET ORAL at 20:08

## 2024-07-16 RX ADMIN — Medication 1 PATCH: at 20:50

## 2024-07-16 NOTE — BH SOCIAL WORK INITIAL PSYCHOSOCIAL EVALUATION - NSBHSAALC_PSY_A_CORE FT
pt reports on 2W drinking 1 bottle of wine, a shot, and a jr mixer per day - last on her birthday [birth year]    On 7/16/24, Patient reported to this Writer a hx of alcohol misuse 4 years ago, reports now only drinking

## 2024-07-16 NOTE — BH SOCIAL WORK INITIAL PSYCHOSOCIAL EVALUATION - NSBHCHILDAGEFT_PSY_ALL_CORE
Son, age 15 Son, age 15.  As per ED note, SW to contacted [facility] regarding 15 y o son as well as [employer] to do a wellness check. Son, age 15.  As per ED note, SW to contacted [facility] regarding 15 y o son as well as [employer] to do a wellness check. Case was not accepted by [facility].

## 2024-07-16 NOTE — BH SOCIAL WORK INITIAL PSYCHOSOCIAL EVALUATION - DO YOU EVER NEED HELP READING HOSPITAL MATERIALS?
[facility] Progress Note - Hemalatha Barron 35 y o  female MRN: [id number]    Unit/Bed#: [address] [place] [address] Encounter: [id number]      Assessment/Plan:  Nausea  Assessment & Plan  Patient presents with 3 days of nausea and vomiting  Possibly viral in nature as she notes that she has had sick contacts recently  Since she was not feeling well today she did not check her sugars today and thus did not take her insulin  No blood in vomit  · Electrolytes stabilized with IVF  · Zofran    Type 2 diabetes mellitus with hyperglycemia, with long-term current use of insulin [facility])  Assessment & Plan  Lab Results   Component Value Date    HGBA1C 10 7 (H) 09/17/2019       Recent Labs     02/07/20  0738 02/07/20  1105 02/07/20  1652 02/07/20  2047   POCGLU 169* 170* 289* 286*       Blood Sugar Average: Last 72 hrs:  (P) 245  2    Per ED note, patient's sugars were over 500 on arrival   Patient given 2 1 L boluses as well as 7 units of insulin  Patient did not take her insulin today she was not feeling well  · ISS Algo 3  · Diabetic diet  · Continue patient's home Lantus 30 units daily  · POCT q4hrs while patient is NPO     * Right upper quadrant pain  Assessment & Plan  Right upper quadrant pain noted on exam   Patient notes that it is only painful when pressure is applied  Patient has multiple risk factors for gallstones including her sex, age, and weight  · RUQ ultrasound: Cholelithiasis  No acute cholecystitis, choledocholithiasis, or biliary ductal obstruction  Prominent, fatty liver  · Patient symptomatic, Surgery consulted  · Plan for cholecystectomy on Monday 2/10  · NPO at midnight, refrain from narcotic pain medication    Asthma  Assessment & Plan  Not currently on any medications  Stable  Adult ADHD  Assessment & Plan  Stable  Currently receiving therapy at [address] [place] [address] disorder [facility])  Assessment & Plan  Continue Depakote 500 mg b i d  And Effexor 150 mg p o   Daily     History of hypothyroidism  Assessment & Plan  Not currently on any medications  Most recent TSH done on 09/17/2019: was within normal limits 1 661     Morbidly obese (HCC)  Assessment & Plan  BMI 40 92  Consider nutrition consult     History of drug abuse [facility])  Assessment & Plan  Patient last used heroin 2 and half years ago  Currently on methadone 120 mg p o  Daily     Anxiety and depression  Assessment & Plan   Stable  Continue Effexor 150 mg p o  Daily and Minipress 1 mg p o  Daily     Hypertension  Assessment & Plan  /80 on admission  · Continue home Prinzide 20-12 5 mg p o  Daily and prazosin 1 mg p o  Daily     Hyperkalemia-resolved as of 2/7/2020  Assessment & Plan  K 5 7 on admission  Received 7 units of insulin in the ED along with fluid boluses  Repeat BMP this evening    Hyponatremia-resolved as of 2/6/2020  Assessment & Plan  Patient is sodium 129 on admission  Corrected value is 132  Continue normal saline 125 mL/hr  Recheck K 4 1      Subjective:   Patient seen and examined at bedside  She notes that her nausea subsided yesterday  Her abdominal pain has also improved  She does not have any questions about her surgery today  Objective:     Vitals: Blood pressure 167/77, pulse 64, temperature 97 6 °F (36 4 °C), resp  rate 16, height 5' 3" (1 6 m), weight 105 kg (231 lb), last menstrual period 01/18/2020, SpO2 96 %, not currently breastfeeding  ,Body mass index is 40 92 kg/m²    Wt Readings from Last 3 Encounters:   02/05/20 105 kg (231 lb)   09/17/19 111 kg (244 lb)   03/16/19 117 kg (258 lb)     No intake or output data in the 24 hours ending 02/10/20 0747    Physical Exam:     General: A&Ox3, NAD  Heart: RRR with S1 S2  Lungs: CTA bilaterally   Abdomen: obese, normal BS, tenderness in RUQ and epigastric region      Recent Results (from the past 24 hour(s))   Fingerstick Glucose (POCT)    Collection Time: 02/09/20 11:28 AM   Result Value Ref Range    POC Glucose 134 65 - 140 mg/dl   Fingerstick no Glucose (POCT)    Collection Time: 02/09/20  4:18 PM   Result Value Ref Range    POC Glucose 186 (H) 65 - 140 mg/dl   Fingerstick Glucose (POCT)    Collection Time: 02/09/20  9:08 PM   Result Value Ref Range    POC Glucose 179 (H) 65 - 140 mg/dl   CBC and differential    Collection Time: 02/10/20  5:50 AM   Result Value Ref Range    WBC 5 01 4 31 - 10 16 Thousand/uL    RBC 3 88 3 81 - 5 12 Million/uL    Hemoglobin 11 5 11 5 - 15 4 g/dL    Hematocrit 34 6 (L) 34 8 - 46 1 %    MCV 89 82 - 98 fL    MCH 29 6 26 8 - 34 3 pg    MCHC 33 2 31 4 - 37 4 g/dL    RDW 12 9 11 6 - 15 1 %    MPV 9 4 8 9 - 12 7 fL    Platelets 290 501 - 400 Thousands/uL    nRBC 0 /100 WBCs    Neutrophils Relative 47 43 - 75 %    Immat GRANS % 0 0 - 2 %    Lymphocytes Relative 44 14 - 44 %    Monocytes Relative 6 4 - 12 %    Eosinophils Relative 3 0 - 6 %    Basophils Relative 0 0 - 1 %    Neutrophils Absolute 2 31 1 85 - 7 62 Thousands/µL    Immature Grans Absolute 0 02 0 00 - 0 20 Thousand/uL    Lymphocytes Absolute 2 20 0 60 - 4 47 Thousands/µL    Monocytes Absolute 0 32 0 17 - 1 22 Thousand/µL    Eosinophils Absolute 0 15 0 00 - 0 61 Thousand/µL    Basophils Absolute 0 01 0 00 - 0 10 Thousands/µL   Magnesium    Collection Time: 02/10/20  5:50 AM   Result Value Ref Range    Magnesium 2 1 1 6 - 2 6 mg/dL   Phosphorus    Collection Time: 02/10/20  5:50 AM   Result Value Ref Range    Phosphorus 3 4 2 7 - 4 5 mg/dL   Comprehensive metabolic panel    Collection Time: 02/10/20  5:50 AM   Result Value Ref Range    Sodium 140 136 - 145 mmol/L    Potassium 4 2 3 5 - 5 3 mmol/L    Chloride 104 100 - 108 mmol/L    CO2 29 21 - 32 mmol/L    ANION GAP 7 4 - 13 mmol/L    BUN 7 5 - 25 mg/dL    Creatinine 0 99 0 60 - 1 30 mg/dL    Glucose 147 (H) 65 - 140 mg/dL    Calcium 8 2 (L) 8 3 - 10 1 mg/dL    AST 28 5 - 45 U/L    ALT 36 12 - 78 U/L    Alkaline Phosphatase 47 46 - 116 U/L    Total Protein 6 4 6 4 - 8 2 g/dL    Albumin 2 7 (L) 3 5 - 5 0 g/dL    Total Bilirubin 0 30 0 20 - 1 00 mg/dL    eGFR 70 ml/min/1 73sq m   Fingerstick Glucose (POCT)    Collection Time: 02/10/20  7:12 AM   Result Value Ref Range    POC Glucose 152 (H) 65 - 140 mg/dl       Current Facility-Administered Medications   Medication Dose Route Frequency Provider Last Rate Last Dose    divalproex sodium (DEPAKOTE ER) 24 hr tablet 500 mg  500 mg Oral BID Angelina Stringer DO   500 mg at 02/09/20 1701    hydrOXYzine HCL (ATARAX) tablet 50 mg  50 mg Oral HS Leann Carlos, DO   50 mg at 02/09/20 2217    insulin glargine (LANTUS) subcutaneous injection 30 Units 0 3 mL  30 Units Subcutaneous HS Angelina Stringer, DO   30 Units at 02/09/20 2217    insulin lispro (HumaLOG) 100 units/mL subcutaneous injection 1-6 Units  1-6 Units Subcutaneous 4x Daily (AC & HS) Angelina Stringer DO        insulin lispro (HumaLOG) 100 units/mL subcutaneous injection 3 Units  3 Units Subcutaneous TID AC Leyt Leonard MD   3 Units at 02/09/20 1701    lisinopril-hydrochlorothiazide (PRINZIDE 20/12  5) combo dose   Oral Daily Keshahong Holder DO        methadone (DOLOPHINE) tablet 120 mg  120 mg Oral Daily Before Breakfast Angelina Stringer DO   120 mg at 02/10/20 0644    ondansetron (ZOFRAN-ODT) dispersible tablet 4 mg  4 mg Oral Q6H [address] Angelina Stringer, DO   4 mg at 02/09/20 0559    prazosin (MINIPRESS) capsule 3 mg  3 mg Oral HS Angelina Pérezo, DO   3 mg at 02/09/20 2216    sodium chloride 0 9 % infusion  50 mL/hr Intravenous Continuous Angelina Olearyotto, DO 50 mL/hr at 02/09/20 2026 50 mL/hr at 02/09/20 2026    venlafaxine (EFFEXOR-XR) 24 hr capsule 300 mg  300 mg Oral Daily Angelina Pérezo, DO   300 mg at 02/09/20 7505       Invasive Devices     Peripheral Intravenous Line            Peripheral IV 02/08/20 Right;Ventral (anterior) Forearm 1 day                Lab, Imaging and other studies: I have personally reviewed pertinent reports      VTE Pharmacologic Prophylaxis: Reason for no pharmacologic prophylaxis held for surgery today   VTE Mechanical Prophylaxis: sequential compression device    Vigo Ogone, DO
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
## 2024-07-16 NOTE — BH SOCIAL WORK INITIAL PSYCHOSOCIAL EVALUATION - NSBHMILITARYHX_PSY_ALL_CORE
There are no preventive care reminders to display for this patient.    Patient is up to date, no discussion needed.   None
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
## 2024-07-16 NOTE — BH SOCIAL WORK INITIAL PSYCHOSOCIAL EVALUATION - NSPTSTATEDGOAL_PSY_ALL_CORE
Patient seeks psychiatric stabilization to return to the community with appropriate aftercare and supports.
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
## 2024-07-16 NOTE — BH INPATIENT PSYCHIATRY PROGRESS NOTE - NSBHMETABOLIC_PSY_ALL_CORE_FT
BMI: BMI (kg/m2): 22.3 (07-12-24 @ 16:04)  HbA1c: A1C with Estimated Average Glucose Result: 5.0 % (07-03-24 @ 06:45)    Glucose:   BP: --Vital Signs Last 24 Hrs  T(C): 36.1 (07-16-24 @ 07:25), Max: 36.1 (07-16-24 @ 07:25)  T(F): 97 (07-16-24 @ 07:25), Max: 97 (07-16-24 @ 07:25)  HR: --  BP: --  BP(mean): --  RR: 18 (07-16-24 @ 07:25) (18 - 18)  SpO2: --    Orthostatic VS  07-16-24 @ 07:25  Lying BP: --/-- HR: --  Sitting BP: 126/86 HR: 97  Standing BP: 129/72 HR: 81  Site: --  Mode: --  Orthostatic VS  07-15-24 @ 07:29  Lying BP: --/-- HR: --  Sitting BP: 135/65 HR: 76  Standing BP: 136/78 HR: 97  Site: --  Mode: --    Lipid Panel: Date/Time: 07-03-24 @ 06:45  Cholesterol, Serum: 113  LDL Cholesterol Calculated: 54  HDL Cholesterol, Serum: 51  Total Cholesterol/HDL Ration Measurement: --  Triglycerides, Serum: 40

## 2024-07-16 NOTE — BH SOCIAL WORK INITIAL PSYCHOSOCIAL EVALUATION - NSBHSATHC_PSY_A_CORE FT
occasional use  on 2W pt reports "smoking weed like a motherfucker" daily     On 7/16/24, Patient denies recent cannabis use and denies "cravings" to this Writer.
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
## 2024-07-16 NOTE — BH SOCIAL WORK INITIAL PSYCHOSOCIAL EVALUATION - NSBHBARRIERS_PSY_ALL_CORE
Financial difficulties/Lack of insight/Medical co-morbidities/Non-compliant with treatment/Poor judgement

## 2024-07-17 RX ADMIN — CLONAZEPAM 0.5 MILLIGRAM(S): 0.5 TABLET ORAL at 12:41

## 2024-07-17 RX ADMIN — Medication 1 TABLET(S): at 08:37

## 2024-07-17 RX ADMIN — Medication 3 MILLIGRAM(S): at 21:07

## 2024-07-17 RX ADMIN — Medication 1 PATCH: at 08:36

## 2024-07-17 RX ADMIN — Medication 325 MILLIGRAM(S): at 08:37

## 2024-07-17 RX ADMIN — Medication 10 MILLIGRAM(S): at 08:36

## 2024-07-17 RX ADMIN — CLONAZEPAM 0.5 MILLIGRAM(S): 0.5 TABLET ORAL at 21:07

## 2024-07-17 RX ADMIN — Medication 81 MILLIGRAM(S): at 08:37

## 2024-07-17 RX ADMIN — Medication 10 MILLIGRAM(S): at 21:08

## 2024-07-17 RX ADMIN — CLONAZEPAM 0.5 MILLIGRAM(S): 0.5 TABLET ORAL at 08:36

## 2024-07-17 NOTE — BH TREATMENT PLAN - NSTXSUICIDINTERRN_PSY_ALL_CORE
Assess for suicidality. Educate patient regarding appropriate coping skills to manage suicidal thoughts and impulses.
Assess for suicidality. Educate patient regarding appropriate coping skills to manage suicidal thoughts and impulses.

## 2024-07-17 NOTE — BH INPATIENT PSYCHIATRY PROGRESS NOTE - ATTENDING COMMENTS
Care was discussed and reviewed in the interdisciplinary treatment team.  I, Amanda Rizzo MD, have reviewed and verified the documentation.  I independently performed the documented medical decision making.

## 2024-07-17 NOTE — BH INPATIENT PSYCHIATRY PROGRESS NOTE - NSBHMETABOLIC_PSY_ALL_CORE_FT
BMI: BMI (kg/m2): 22.3 (07-12-24 @ 16:04)  HbA1c: A1C with Estimated Average Glucose Result: 5.0 % (07-03-24 @ 06:45)    Glucose:   BP: --Vital Signs Last 24 Hrs  T(C): 36.4 (07-17-24 @ 07:49), Max: 36.4 (07-17-24 @ 07:49)  T(F): 97.6 (07-17-24 @ 07:49), Max: 97.6 (07-17-24 @ 07:49)  HR: --  BP: --  BP(mean): --  RR: 19 (07-17-24 @ 07:49) (19 - 19)  SpO2: --    Orthostatic VS  07-17-24 @ 07:49  Lying BP: --/-- HR: --  Sitting BP: 127/66 HR: 73  Standing BP: 128/80 HR: 76  Site: --  Mode: --  Orthostatic VS  07-16-24 @ 07:25  Lying BP: --/-- HR: --  Sitting BP: 126/86 HR: 97  Standing BP: 129/72 HR: 81  Site: --  Mode: --    Lipid Panel: Date/Time: 07-03-24 @ 06:45  Cholesterol, Serum: 113  LDL Cholesterol Calculated: 54  HDL Cholesterol, Serum: 51  Total Cholesterol/HDL Ration Measurement: --  Triglycerides, Serum: 40
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
## 2024-07-17 NOTE — BH TREATMENT PLAN - NSTXVIOLNTINTERRN_PSY_ALL_CORE
Educate patient regarding appropriate coping skills to help manage aggressive impulses and encourage to utilize.
Educate patient regarding appropriate coping skills to help manage aggressive impulses and encourage to utilize.

## 2024-07-17 NOTE — BH TREATMENT PLAN - NSTXDCFAMINTERSW_PSY_ALL_CORE
SW will meet with Patient to provide supportive psychotherapy and psychoeducation towards Patient's goal. SW will continue to encourage Patient to involve family and other supports in inpt/outpt planning. SW will meet with treatment team daily to discuss Patient's goal progress.

## 2024-07-17 NOTE — BH INPATIENT PSYCHIATRY PROGRESS NOTE - NSBHATTESTBILLONSITE_PSY_A_CORE
SHERIF to bill Attending to bill Abbe Flap (Lower To Upper Lip) Text: The defect of the upper lip was assessed and measured.  Given the location and size of the defect, an Abbe flap was deemed most appropriate.  Using a sterile surgical marker, an appropriate Abbe flap was measured and drawn on the lower lip. Local anesthesia was then infiltrated. A scalpel was then used to incise the upper lip through and through the skin, vermilion, muscle and mucosa, leaving the flap pedicled on the opposite side.  The flap was then rotated and transferred to the lower lip defect.  The flap was then sutured into place with a three layer technique, closing the orbicularis oris muscle layer with subcutaneous buried sutures, followed by a mucosal layer and an epidermal layer.

## 2024-07-17 NOTE — BH TREATMENT PLAN - NSTXDCOPNOINTERSW_PSY_ALL_CORE
SW will meet with Patient to provide supportive psychotherapy and psychoeducation towards Patient's goal. SW will explore Patient's lapse in care and create consistent outpt goals. SW meet with multidisciplinary team daily for updates on Patient's goal progress.

## 2024-07-17 NOTE — BH TREATMENT PLAN - NSTXMEDICINTERRN_PSY_ALL_CORE
Educate patient regarding medication regimen, potential side effects. Monitor for effectiveness. Encourage to continue to maintain compliance
Educate patient regarding medication regimen, potential side effects. Monitor for effectiveness. Encourage to continue to maintain compliance

## 2024-07-17 NOTE — BH TREATMENT PLAN - NSTXPSYCHOINTERRN_PSY_ALL_CORE
Educate patient regarding appropriate coping skills and encourage to utilize. Administer medication as prescribed.
Educate patient regarding appropriate coping skills and encourage to utilize. Administer medication as prescribed.

## 2024-07-17 NOTE — BH TREATMENT PLAN - NSTXDISORGINTERRN_PSY_ALL_CORE
Administer medication as prescribed and monitor for effectiveness. Educate regarding appropriate coping skills and encourage to utilize
Administer medication as prescribed and monitor for effectiveness. Educate regarding appropriate coping skills and encourage to utilize

## 2024-07-18 RX ORDER — SODIUM CHLORIDE 0.65 %
1 AEROSOL, SPRAY (ML) NASAL EVERY 6 HOURS
Refills: 0 | Status: DISCONTINUED | OUTPATIENT
Start: 2024-07-18 | End: 2024-08-08

## 2024-07-18 RX ORDER — MULTIVITAMIN/IRON/FOLIC ACID 18MG-0.4MG
1 TABLET ORAL DAILY
Refills: 0 | Status: DISCONTINUED | OUTPATIENT
Start: 2024-07-18 | End: 2024-08-08

## 2024-07-18 RX ORDER — DIVALPROEX SODIUM 125 MG/1
750 CAPSULE, COATED PELLETS ORAL AT BEDTIME
Refills: 0 | Status: DISCONTINUED | OUTPATIENT
Start: 2024-07-18 | End: 2024-07-23

## 2024-07-18 RX ADMIN — Medication 3 MILLIGRAM(S): at 21:43

## 2024-07-18 RX ADMIN — Medication 81 MILLIGRAM(S): at 08:24

## 2024-07-18 RX ADMIN — Medication 1 PATCH: at 08:23

## 2024-07-18 RX ADMIN — Medication 2 MILLIGRAM(S): at 18:41

## 2024-07-18 RX ADMIN — Medication 2 MILLIGRAM(S): at 21:46

## 2024-07-18 RX ADMIN — Medication 10 MILLIGRAM(S): at 21:43

## 2024-07-18 RX ADMIN — DIVALPROEX SODIUM 750 MILLIGRAM(S): 125 CAPSULE, COATED PELLETS ORAL at 21:43

## 2024-07-18 RX ADMIN — Medication 2 MILLIGRAM(S): at 10:50

## 2024-07-18 RX ADMIN — CLONAZEPAM 0.5 MILLIGRAM(S): 0.5 TABLET ORAL at 12:35

## 2024-07-18 RX ADMIN — Medication 10 MILLIGRAM(S): at 08:24

## 2024-07-18 RX ADMIN — CLONAZEPAM 0.5 MILLIGRAM(S): 0.5 TABLET ORAL at 21:43

## 2024-07-18 RX ADMIN — Medication 1 TABLET(S): at 08:24

## 2024-07-18 RX ADMIN — CLONAZEPAM 0.5 MILLIGRAM(S): 0.5 TABLET ORAL at 08:24

## 2024-07-18 RX ADMIN — Medication 325 MILLIGRAM(S): at 08:24

## 2024-07-18 NOTE — BH INPATIENT PSYCHIATRY DISCHARGE NOTE - NSBHDCMEDICALFT_PSY_A_CORE
hyperthyroid - pt to follow up with outpt endocrinology  hyperthyroid - pt to follow up with outpt endocrinology   AS diagnosed by cards in medicine - pt to f/u with outpt cardiology
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
## 2024-07-18 NOTE — BH INPATIENT PSYCHIATRY DISCHARGE NOTE - NSBHDCHANDOFFFT_PSY_ALL_CORE
[facility]  provided with discharge summary, medication list, this writer's contact [phone] for any further questions or concerns
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
## 2024-07-18 NOTE — BH INPATIENT PSYCHIATRY DISCHARGE NOTE - NSDCCPCAREPLAN_GEN_ALL_CORE_FT
PRINCIPAL DISCHARGE DIAGNOSIS  Diagnosis: Schizoaffective disorder  Assessment and Plan of Treatment:       SECONDARY DISCHARGE DIAGNOSES  Diagnosis: Heart murmur  Assessment and Plan of Treatment:     Diagnosis: Hyperthyroidism  Assessment and Plan of Treatment:     Diagnosis: History of alcohol use disorder  Assessment and Plan of Treatment:     Diagnosis: Cannabis abuse  Assessment and Plan of Treatment:     Diagnosis: Anemia  Assessment and Plan of Treatment:
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
## 2024-07-18 NOTE — BH INPATIENT PSYCHIATRY DISCHARGE NOTE - NSDCRECOMMENDMEDICALFT_PSY_ALL_CORE
please follow up with endocrinology for hyperthyroidism  please follow up with outpatient interventional cardiology appt with Dr. Lopez [address] [place] [address] [phone] and outpt endocrinology at [facility] [address] [facility] [phone] please follow up with outpatient interventional cardiology appt with Dr. Lopez [address] [facility] [phone] and outpt endocrinology at [facility] [address] [facility] First Floor,  [phone] 12/4/24 @ 8:30AM

## 2024-07-18 NOTE — BH INPATIENT PSYCHIATRY PROGRESS NOTE - CURRENT MEDICATION
MEDICATIONS  (STANDING):  aspirin enteric coated 81 milliGRAM(s) Oral daily  clonazePAM  Tablet 0.5 milliGRAM(s) Oral three times a day  divalproex  milliGRAM(s) Oral at bedtime  ferrous    sulfate 325 milliGRAM(s) Oral daily  folic acid 1 milliGRAM(s) Oral daily  melatonin. 3 milliGRAM(s) Oral at bedtime  methimazole 5 milliGRAM(s) Oral daily  multivitamin 1 Tablet(s) Oral daily  nicotine -   7 mG/24Hr(s) Patch 1 Patch Transdermal daily  OLANZapine 10 milliGRAM(s) Oral two times a day    MEDICATIONS  (PRN):  acetaminophen     Tablet .. 650 milliGRAM(s) Oral every 6 hours PRN Temp greater or equal to 38C (100.4F), Mild Pain (1 - 3)  diphenhydrAMINE 50 milliGRAM(s) Oral every 6 hours PRN EPS  diphenhydrAMINE Injectable 50 milliGRAM(s) IntraMuscular once PRN EPS from PRN for severe agitation  hydrOXYzine hydrochloride 50 milliGRAM(s) Oral every 6 hours PRN anxiety  nicotine  Polacrilex Gum 2 milliGRAM(s) Oral every 2 hours PRN Smoking Cessation  OLANZapine 5 milliGRAM(s) Oral every 6 hours PRN agitation  OLANZapine Injectable 5 milliGRAM(s) IntraMuscular once PRN severe agitation  sodium chloride 0.65% Nasal 1 Spray(s) Both Nostrils every 6 hours PRN Nasal Congestion

## 2024-07-18 NOTE — BH INPATIENT PSYCHIATRY DISCHARGE NOTE - ATTENDING DISCHARGE PHYSICAL EXAMINATION:
Care was discussed and reviewed in the interdisciplinary treatment team.  I, Amanda Rizzo MD, have reviewed and verified the documentation.  I independently performed the documented medical decision making.      Patient was seen and evaluated today by this writer. Patient reported that she is feeling much better and is looking forward to being able to leave the hospital. Patient is denying any SI and has been able to contract for safety. Denies any HI or hallucinations and no delusions have been elicited. The patient has been educated about the safety plan and understands that if she feels like harming herself or others, she can call 911 or go to any ER. During the hospital stay, the patient has not demonstrated any aggressive or self-injurious behaviors, and this has been consistent with my observations and the observations of the staff.

## 2024-07-18 NOTE — BH INPATIENT PSYCHIATRY PROGRESS NOTE - NSBHMETABOLIC_PSY_ALL_CORE_FT
BMI: BMI (kg/m2): 22.3 (07-12-24 @ 16:04)  HbA1c: A1C with Estimated Average Glucose Result: 5.0 % (07-03-24 @ 06:45)    Glucose:   BP: --Vital Signs Last 24 Hrs  T(C): 36.2 (07-18-24 @ 08:33), Max: 36.2 (07-18-24 @ 08:33)  T(F): 97.1 (07-18-24 @ 08:33), Max: 97.1 (07-18-24 @ 08:33)  HR: --  BP: --  BP(mean): --  RR: 18 (07-18-24 @ 08:33) (18 - 18)  SpO2: --    Orthostatic VS  07-18-24 @ 08:33  Lying BP: --/-- HR: --  Sitting BP: 140/75 HR: 79  Standing BP: 121/78 HR: 81  Site: --  Mode: --  Orthostatic VS  07-17-24 @ 07:49  Lying BP: --/-- HR: --  Sitting BP: 127/66 HR: 73  Standing BP: 128/80 HR: 76  Site: --  Mode: --    Lipid Panel: Date/Time: 07-03-24 @ 06:45  Cholesterol, Serum: 113  LDL Cholesterol Calculated: 54  HDL Cholesterol, Serum: 51  Total Cholesterol/HDL Ration Measurement: --  Triglycerides, Serum: 40
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
## 2024-07-18 NOTE — BH INPATIENT PSYCHIATRY DISCHARGE NOTE - NSBHFUPINTERVALHXFT_PSY_A_CORE
Discharge Progress Note Date and Time: 08-08-24 @ 12:10  Pt assessed for psychosis. Chart reviewed and case discussed with treatment team. No interval events reported over night. Pt seen with Dr. Mullen present. Pt adamantly denies SIIP, HIIP, A/VH, or paranoia. Pt educated on the use of 911 or going to the nearest ED of safety concerns should arise in the community; pt verbalized understanding. Pt states she will be discharged to her niece's home where her mother is staying. Pt aware of OOP btwn herself and her  and agrees that she will stay away from him upon discharge, "He is insignificant to me." pt states she feels better on meds and will continue to take them upon discharge. Pt discharged to her niece's home today. Pt's family did not express any safety concerns for discharge

## 2024-07-18 NOTE — BH INPATIENT PSYCHIATRY DISCHARGE NOTE - NSDCMRMEDTOKEN_GEN_ALL_CORE_FT
acetaminophen 325 mg oral tablet: 2 tab(s) orally every 6 hours As needed Temp greater or equal to 38C (100.4F), Mild Pain (1 - 3)  aspirin 81 mg oral capsule: 1 cap(s) orally once a day  ferrous sulfate 325 mg (65 mg elemental iron) oral tablet: 1 tab(s) orally once a day  melatonin 3 mg oral tablet: 1 tab(s) orally once a day (at bedtime) As needed Insomnia  methIMAzole 5 mg oral tablet: 1 tab(s) orally once a day  Multiple Vitamins oral tablet: 1 tab(s) orally once a day  nicotine 7 mg/24 hr transdermal film, extended release: transdermal once a day  OLANZapine 10 mg oral tablet: 1 tab(s) orally once a day (at bedtime) at 2000   aspirin 81 mg oral delayed release tablet: 1 tab(s) orally once a day  clonazePAM 0.5 mg oral tablet: 1 tab(s) orally once a day (at bedtime) MDD: 0.5 mg  Depakote  mg oral tablet, extended release: 2 tab(s) orally once a day (at bedtime)  ferrous sulfate 325 mg (65 mg elemental iron) oral tablet: 1 tab(s) orally once a day  folic acid 1 mg oral tablet: 1 tab(s) orally once a day  melatonin 3 mg oral tablet: 1 tab(s) orally once a day (at bedtime)  methIMAzole 5 mg oral tablet: 1 tab(s) orally once a day  Multiple Vitamins oral tablet: 1 tab(s) orally once a day  nicotine 7 mg/24 hr transdermal film, extended release: 1 patch transdermal once a day  ocular lubricant ophthalmic solution: 1 drop(s) to each affected eye once a day  OLANZapine 10 mg oral tablet: 1 tab(s) orally once a day  OLANZapine 20 mg oral tablet: 1 tab(s) orally once a day (at bedtime)
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
## 2024-07-19 RX ORDER — DIPHENHYDRAMINE HCL 25 MG
50 CAPSULE ORAL ONCE
Refills: 0 | Status: COMPLETED | OUTPATIENT
Start: 2024-07-19 | End: 2024-07-19

## 2024-07-19 RX ADMIN — Medication 1 MILLIGRAM(S): at 09:03

## 2024-07-19 RX ADMIN — DIVALPROEX SODIUM 750 MILLIGRAM(S): 125 CAPSULE, COATED PELLETS ORAL at 21:14

## 2024-07-19 RX ADMIN — Medication 50 MILLIGRAM(S): at 23:45

## 2024-07-19 RX ADMIN — CLONAZEPAM 0.5 MILLIGRAM(S): 0.5 TABLET ORAL at 21:16

## 2024-07-19 RX ADMIN — CLONAZEPAM 0.5 MILLIGRAM(S): 0.5 TABLET ORAL at 09:03

## 2024-07-19 RX ADMIN — Medication 2 MILLIGRAM(S): at 09:04

## 2024-07-19 RX ADMIN — Medication 1 PATCH: at 09:02

## 2024-07-19 RX ADMIN — Medication 10 MILLIGRAM(S): at 09:03

## 2024-07-19 RX ADMIN — Medication 3 MILLIGRAM(S): at 21:14

## 2024-07-19 RX ADMIN — Medication 1 TABLET(S): at 09:03

## 2024-07-19 RX ADMIN — CLONAZEPAM 0.5 MILLIGRAM(S): 0.5 TABLET ORAL at 12:16

## 2024-07-19 RX ADMIN — Medication 10 MILLIGRAM(S): at 21:14

## 2024-07-19 RX ADMIN — Medication 81 MILLIGRAM(S): at 09:03

## 2024-07-19 RX ADMIN — Medication 2 MILLIGRAM(S): at 10:53

## 2024-07-19 RX ADMIN — Medication 325 MILLIGRAM(S): at 09:03
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
## 2024-07-19 NOTE — BH INPATIENT PSYCHIATRY PROGRESS NOTE - ATTENDING COMMENTS
Care was discussed and reviewed in the interdisciplinary treatment team.  I, Amanda Rizzo MD, have reviewed and verified the documentation.  I independently performed the documented medical decision making.        Care was discussed and reviewed in the interdisciplinary treatment team.  I, Amanda Rizzo MD, have reviewed and verified the documentation.  I independently performed the documented medical decision making.

## 2024-07-19 NOTE — BH INPATIENT PSYCHIATRY PROGRESS NOTE - NSBHMETABOLIC_PSY_ALL_CORE_FT
BMI: BMI (kg/m2): 22.3 (07-12-24 @ 16:04)  HbA1c: A1C with Estimated Average Glucose Result: 5.0 % (07-03-24 @ 06:45)    Glucose:   BP: --Vital Signs Last 24 Hrs  T(C): 36.8 (07-19-24 @ 07:46), Max: 36.8 (07-19-24 @ 07:46)  T(F): 98.3 (07-19-24 @ 07:46), Max: 98.3 (07-19-24 @ 07:46)  HR: --  BP: --  BP(mean): --  RR: 17 (07-19-24 @ 07:46) (17 - 17)  SpO2: --    Orthostatic VS  07-19-24 @ 07:46  Lying BP: --/-- HR: --  Sitting BP: 129/67 HR: 74  Standing BP: 122/69 HR: 78  Site: --  Mode: --  Orthostatic VS  07-18-24 @ 08:33  Lying BP: --/-- HR: --  Sitting BP: 140/75 HR: 79  Standing BP: 121/78 HR: 81  Site: --  Mode: --    Lipid Panel: Date/Time: 07-03-24 @ 06:45  Cholesterol, Serum: 113  LDL Cholesterol Calculated: 54  HDL Cholesterol, Serum: 51  Total Cholesterol/HDL Ration Measurement: --  Triglycerides, Serum: 40

## 2024-07-19 NOTE — BH INPATIENT PSYCHIATRY PROGRESS NOTE - CURRENT MEDICATION
MEDICATIONS  (STANDING):  artificial tears (preservative free) Ophthalmic Solution 1 Drop(s) Both EYES daily  aspirin enteric coated 81 milliGRAM(s) Oral daily  clonazePAM  Tablet 0.5 milliGRAM(s) Oral three times a day  divalproex  milliGRAM(s) Oral at bedtime  ferrous    sulfate 325 milliGRAM(s) Oral daily  folic acid 1 milliGRAM(s) Oral daily  melatonin. 3 milliGRAM(s) Oral at bedtime  methimazole 5 milliGRAM(s) Oral daily  multivitamin 1 Tablet(s) Oral daily  nicotine -   7 mG/24Hr(s) Patch 1 Patch Transdermal daily  OLANZapine 10 milliGRAM(s) Oral two times a day    MEDICATIONS  (PRN):  acetaminophen     Tablet .. 650 milliGRAM(s) Oral every 6 hours PRN Temp greater or equal to 38C (100.4F), Mild Pain (1 - 3)  diphenhydrAMINE 50 milliGRAM(s) Oral every 6 hours PRN EPS  diphenhydrAMINE Injectable 50 milliGRAM(s) IntraMuscular once PRN EPS from PRN for severe agitation  hydrOXYzine hydrochloride 50 milliGRAM(s) Oral every 6 hours PRN anxiety  nicotine  Polacrilex Gum 2 milliGRAM(s) Oral every 2 hours PRN Smoking Cessation  OLANZapine 5 milliGRAM(s) Oral every 6 hours PRN agitation  OLANZapine Injectable 5 milliGRAM(s) IntraMuscular once PRN severe agitation  sodium chloride 0.65% Nasal 1 Spray(s) Both Nostrils every 6 hours PRN Nasal Congestion

## 2024-07-19 NOTE — BH CHART NOTE - NSEVENTNOTEFT_PSY_ALL_CORE
Per chart review pt is psychotic (paranoid, disorganized, perseverative, religiously preoccupied) i/s/o recent medical complications (h/o heart murmur and VSD s/p repair at 8yo; now new mod-severe AS; newly diagnosed Graves Disease).     Interval History: ZAK called for patient reporting "feelings of electricity" or "being tazed" on her left scapula area. She has experienced this in the past, but feels it is more intense and concerning over the past 2 days. Pt tells nurse and ZAK she is very anxious about her symptoms because of her heart conditions, notes that cardiology told her she needs to have heart surgery. Also reports heart palpitations and shortness of breath related to increased anxiety. On exam pt describes multiple concerns, including that her remote heart surgery at 8yo or past tubal ligation may be causing her heart problems and making her body "fill up with blood." Per nursing pt also noted concerns that someone may have implanted a magnet in her back. She denies current chest pain, SOB, edema, headache, visual changes, confusion, weakness. Feels sometimes she "goes somewhere else" for a few seconds and "maybe dies" but denies true syncope or falls. She is eating ok, no changes in urination or bowel movements.     ZAK shared patient's reassuring vital signs and exam, discussed high likelihood her psychiatric symptoms are leading to her current presentation. Offered patient PRNs for anxiety and discussed multiple coping skills/relaxation techniques. Advised pt to reach out to staff if there are any changes.       T(C): 37.1 (07-19-24 @ 18:56), Max: 37.1 (07-19-24 @ 18:56)  HR: 84 (07-19-24 @ 18:56) (84 - 84)  BP: 129/60 (07-19-24 @ 18:56) (129/60 - 129/60)  RR: 17 (07-19-24 @ 07:46) (17 - 17)  SpO2: --    Physical Exam:  Gen: Patient sitting on bed, NAD   HEENT: NC/AT,  EOMI.    Resp: CTA b/l. No wheezes, ronchi, or crackles. No WOB.   CV: Radial pulses 2+ b/l, RRR; +loud systolic murmur.  Abd: soft, NTND, no guarding or rigidity. NABS.    Ext: ROM intact, no clubbing, edema, or cyanosis.   Neuro: awake, alert, grossly oriented.     Assessment:  ZAK called for increased anxiety due to abnormal sensations on left scapula. On exam pt anxious and preoccupied with abnormal thought content notable for somatic and paranoid delusions. She is otherwise calm, pleasant and cooperative. Pt clinically stable with murmur appreciated on PE consistent with [facility] medicine/cardiology notes, but pt otherwise euvolemic, VSS. Presentation likely due to psychotic anxiety. Provided pt with reassurance, she is agreeable to PRNs and coping skills/relaxation techniques. She will reach out to staff if her symptoms worsen.     Plan:  1. no further medical intervention necessary at this time.   2. will continue to monitor routinely.   3. d/w RN staff    ZAK called at 18:50 for pt report of "feelings of electricity"/"being tazed" on her left scapula. Per RN pt shared concerns that someone implanted a magnet in her back, however RN is also concerned given pt's cardiac history. Per chart review pt is p/w acute psychosis (paranoia, Baptism delusions, disorganization) but required medical hospitalization prior to transfer for newly diagnosed Graves Disease and mod-severe AS i/s/o h/o childhood VSD repair. Pt also undergoing medication changes, most recently additions of depakote and methimazole.     Pt has been experiencing these current sxs for years, but feels they are more intense and concerning over the past 2 days but cannot elaborate. She is worried that she can feel her heart beating and sometimes can't catch her breath. She worries her heart surgery at 8yo and/or tubal ligation may be causing her heart problems and making her "body fill up with blood in the wrong places." She denies current CP, SOB, edema, h/a, visual changes, confusion, weakness, joint pain, fever/chills. Feels sometimes she "goes somewhere else" for a few seconds and "maybe dies" but denies true syncope or falls. She is eating ok, no changes in urination or bowel movements.     T(C): 37.1 (07-19-24 @ 18:56), Max: 37.1 (07-19-24 @ 18:56)  HR: 84 (07-19-24 @ 18:56) (84 - 84)  BP: 129/60 (07-19-24 @ 18:56) (129/60 - 129/60)  RR: 17 (07-19-24 @ 07:46) (17 - 17)  SpO2: --    Physical Exam:  Gen: Patient sitting on bed, NAD   HEENT: NC/AT,  EOMI.    Resp: CTA b/l. No wheezes, ronchi, or crackles. No WOB.   CV: Radial pulses 2+ b/l, RRR; +loud systolic murmur.  Abd: soft, NTND, no guarding or rigidity. NABS.    Ext: ROM intact, no clubbing, edema, or cyanosis.   Neuro: awake, alert, grossly oriented.     Assessment:  ZAK called for increased anxiety due to abnormal sensations on left scapula. On exam pt anxious and preoccupied with abnormal thought content notable for somatic and paranoid delusions. She is otherwise calm, pleasant and cooperative. Notes that all of her reported sxs have been going on for "years." Pt clinically stable with murmur appreciated on PE consistent with [facility] medicine/cardiology notes, but pt otherwise euvolemic, VSS. Presentation likely due to psychotic anxiety. Provided pt with reassurance, she is agreeable to PRNs and coping skills/relaxation techniques. She will reach out to staff if her symptoms worsen.       Plan:  1. no further medical intervention necessary at this time.   2. pt with recent medication changes (depakote, methimazole) but presentation not c/w known side effects.  3. will continue to monitor routinely.   4. d/w RN staff

## 2024-07-20 RX ADMIN — Medication 2 MILLIGRAM(S): at 21:37

## 2024-07-20 RX ADMIN — Medication 1 TABLET(S): at 09:17

## 2024-07-20 RX ADMIN — DIVALPROEX SODIUM 750 MILLIGRAM(S): 125 CAPSULE, COATED PELLETS ORAL at 20:21

## 2024-07-20 RX ADMIN — CLONAZEPAM 0.5 MILLIGRAM(S): 0.5 TABLET ORAL at 09:16

## 2024-07-20 RX ADMIN — Medication 2 MILLIGRAM(S): at 09:17

## 2024-07-20 RX ADMIN — Medication 1 MILLIGRAM(S): at 09:16

## 2024-07-20 RX ADMIN — Medication 1 PATCH: at 09:00

## 2024-07-20 RX ADMIN — Medication 2 MILLIGRAM(S): at 13:26

## 2024-07-20 RX ADMIN — Medication 325 MILLIGRAM(S): at 09:17

## 2024-07-20 RX ADMIN — Medication 3 MILLIGRAM(S): at 20:23

## 2024-07-20 RX ADMIN — CLONAZEPAM 0.5 MILLIGRAM(S): 0.5 TABLET ORAL at 20:21

## 2024-07-20 RX ADMIN — Medication 10 MILLIGRAM(S): at 20:20

## 2024-07-20 RX ADMIN — Medication 10 MILLIGRAM(S): at 09:17

## 2024-07-20 RX ADMIN — CLONAZEPAM 0.5 MILLIGRAM(S): 0.5 TABLET ORAL at 15:05

## 2024-07-20 RX ADMIN — Medication 81 MILLIGRAM(S): at 09:16

## 2024-07-20 RX ADMIN — Medication 1 DROP(S): at 09:17

## 2024-07-21 RX ADMIN — Medication 1 PATCH: at 09:57

## 2024-07-21 RX ADMIN — Medication 2 MILLIGRAM(S): at 10:01

## 2024-07-21 RX ADMIN — Medication 1 TABLET(S): at 09:57

## 2024-07-21 RX ADMIN — Medication 81 MILLIGRAM(S): at 09:57

## 2024-07-21 RX ADMIN — CLONAZEPAM 0.5 MILLIGRAM(S): 0.5 TABLET ORAL at 21:33

## 2024-07-21 RX ADMIN — DIVALPROEX SODIUM 750 MILLIGRAM(S): 125 CAPSULE, COATED PELLETS ORAL at 21:33

## 2024-07-21 RX ADMIN — CLONAZEPAM 0.5 MILLIGRAM(S): 0.5 TABLET ORAL at 09:56

## 2024-07-21 RX ADMIN — Medication 325 MILLIGRAM(S): at 09:57

## 2024-07-21 RX ADMIN — Medication 1 MILLIGRAM(S): at 09:56

## 2024-07-21 RX ADMIN — Medication 10 MILLIGRAM(S): at 09:56

## 2024-07-21 RX ADMIN — Medication 10 MILLIGRAM(S): at 21:33

## 2024-07-21 RX ADMIN — Medication 3 MILLIGRAM(S): at 21:34

## 2024-07-21 RX ADMIN — CLONAZEPAM 0.5 MILLIGRAM(S): 0.5 TABLET ORAL at 13:57

## 2024-07-22 RX ORDER — DIPHENHYDRAMINE HCL 25 MG
50 CAPSULE ORAL ONCE
Refills: 0 | Status: COMPLETED | OUTPATIENT
Start: 2024-07-22 | End: 2024-07-22

## 2024-07-22 RX ADMIN — Medication 1 DROP(S): at 09:35

## 2024-07-22 RX ADMIN — Medication 650 MILLIGRAM(S): at 21:25

## 2024-07-22 RX ADMIN — Medication 1 TABLET(S): at 09:34

## 2024-07-22 RX ADMIN — CLONAZEPAM 0.5 MILLIGRAM(S): 0.5 TABLET ORAL at 09:32

## 2024-07-22 RX ADMIN — Medication 1 PATCH: at 09:35

## 2024-07-22 RX ADMIN — Medication 81 MILLIGRAM(S): at 09:35

## 2024-07-22 RX ADMIN — Medication 1 PATCH: at 09:54

## 2024-07-22 RX ADMIN — Medication 20 MILLIGRAM(S): at 20:32

## 2024-07-22 RX ADMIN — Medication 1 MILLIGRAM(S): at 09:34

## 2024-07-22 RX ADMIN — Medication 50 MILLIGRAM(S): at 21:26

## 2024-07-22 RX ADMIN — CLONAZEPAM 0.5 MILLIGRAM(S): 0.5 TABLET ORAL at 12:44

## 2024-07-22 RX ADMIN — Medication 325 MILLIGRAM(S): at 09:34

## 2024-07-22 RX ADMIN — Medication 3 MILLIGRAM(S): at 20:32

## 2024-07-22 RX ADMIN — Medication 650 MILLIGRAM(S): at 22:15

## 2024-07-22 RX ADMIN — Medication 10 MILLIGRAM(S): at 09:33

## 2024-07-22 RX ADMIN — Medication 2 MILLIGRAM(S): at 13:59

## 2024-07-22 RX ADMIN — CLONAZEPAM 0.5 MILLIGRAM(S): 0.5 TABLET ORAL at 20:32

## 2024-07-22 RX ADMIN — Medication 2 MILLIGRAM(S): at 09:36

## 2024-07-22 RX ADMIN — DIVALPROEX SODIUM 750 MILLIGRAM(S): 125 CAPSULE, COATED PELLETS ORAL at 20:32

## 2024-07-22 NOTE — BH INPATIENT PSYCHIATRY PROGRESS NOTE - NSBHMETABOLIC_PSY_ALL_CORE_FT
BMI: BMI (kg/m2): 22.3 (07-12-24 @ 16:04)  HbA1c: A1C with Estimated Average Glucose Result: 5.0 % (07-03-24 @ 06:45)    Glucose:   BP: 129/60 (07-19-24 @ 18:56) (129/60 - 129/60)Vital Signs Last 24 Hrs  T(C): 36.8 (07-22-24 @ 07:30), Max: 36.8 (07-22-24 @ 07:30)  T(F): 98.2 (07-22-24 @ 07:30), Max: 98.2 (07-22-24 @ 07:30)  HR: --  BP: --  BP(mean): --  RR: --  SpO2: --    Orthostatic VS  07-22-24 @ 07:30  Lying BP: --/-- HR: --  Sitting BP: 118/62 HR: 68  Standing BP: 111/60 HR: 76  Site: --  Mode: --  Orthostatic VS  07-21-24 @ 07:41  Lying BP: --/-- HR: --  Sitting BP: 124/81 HR: 90  Standing BP: 139/61 HR: 80  Site: --  Mode: --    Lipid Panel: Date/Time: 07-03-24 @ 06:45  Cholesterol, Serum: 113  LDL Cholesterol Calculated: 54  HDL Cholesterol, Serum: 51  Total Cholesterol/HDL Ration Measurement: --  Triglycerides, Serum: 40   BMI: BMI (kg/m2): 22.3 (07-12-24 @ 16:04)  HbA1c: A1C with Estimated Average Glucose Result: 5.0 % (07-03-24 @ 06:45)    Glucose:   BP: --Vital Signs Last 24 Hrs  T(C): 36.8 (07-22-24 @ 07:30), Max: 36.8 (07-22-24 @ 07:30)  T(F): 98.2 (07-22-24 @ 07:30), Max: 98.2 (07-22-24 @ 07:30)  HR: --  BP: --  BP(mean): --  RR: --  SpO2: --    Orthostatic VS  07-22-24 @ 07:30  Lying BP: --/-- HR: --  Sitting BP: 118/62 HR: 68  Standing BP: 111/60 HR: 76  Site: --  Mode: --  Orthostatic VS  07-21-24 @ 07:41  Lying BP: --/-- HR: --  Sitting BP: 124/81 HR: 90  Standing BP: 139/61 HR: 80  Site: --  Mode: --    Lipid Panel: Date/Time: 07-03-24 @ 06:45  Cholesterol, Serum: 113  LDL Cholesterol Calculated: 54  HDL Cholesterol, Serum: 51  Total Cholesterol/HDL Ration Measurement: --  Triglycerides, Serum: 40

## 2024-07-22 NOTE — BH INPATIENT PSYCHIATRY PROGRESS NOTE - CURRENT MEDICATION
MEDICATIONS  (STANDING):  artificial tears (preservative free) Ophthalmic Solution 1 Drop(s) Both EYES daily  aspirin enteric coated 81 milliGRAM(s) Oral daily  clonazePAM  Tablet 0.5 milliGRAM(s) Oral three times a day  divalproex  milliGRAM(s) Oral at bedtime  ferrous    sulfate 325 milliGRAM(s) Oral daily  folic acid 1 milliGRAM(s) Oral daily  melatonin. 3 milliGRAM(s) Oral at bedtime  methimazole 5 milliGRAM(s) Oral daily  multivitamin 1 Tablet(s) Oral daily  nicotine -   7 mG/24Hr(s) Patch 1 Patch Transdermal daily  OLANZapine 20 milliGRAM(s) Oral at bedtime  OLANZapine 10 milliGRAM(s) Oral daily    MEDICATIONS  (PRN):  acetaminophen     Tablet .. 650 milliGRAM(s) Oral every 6 hours PRN Temp greater or equal to 38C (100.4F), Mild Pain (1 - 3)  diphenhydrAMINE 50 milliGRAM(s) Oral every 6 hours PRN EPS  diphenhydrAMINE Injectable 50 milliGRAM(s) IntraMuscular once PRN EPS from PRN for severe agitation  hydrOXYzine hydrochloride 50 milliGRAM(s) Oral every 6 hours PRN anxiety  nicotine  Polacrilex Gum 2 milliGRAM(s) Oral every 2 hours PRN Smoking Cessation  OLANZapine 5 milliGRAM(s) Oral every 6 hours PRN agitation  OLANZapine Injectable 5 milliGRAM(s) IntraMuscular once PRN severe agitation  sodium chloride 0.65% Nasal 1 Spray(s) Both Nostrils every 6 hours PRN Nasal Congestion
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
## 2024-07-23 LAB
ALBUMIN SERPL ELPH-MCNC: 3.5 G/DL — SIGNIFICANT CHANGE UP (ref 3.3–5)
ALP SERPL-CCNC: 79 U/L — SIGNIFICANT CHANGE UP (ref 40–120)
ALT FLD-CCNC: 28 U/L — SIGNIFICANT CHANGE UP (ref 4–33)
ANION GAP SERPL CALC-SCNC: 13 MMOL/L — SIGNIFICANT CHANGE UP (ref 7–14)
AST SERPL-CCNC: 24 U/L — SIGNIFICANT CHANGE UP (ref 4–32)
BILIRUB SERPL-MCNC: 0.4 MG/DL — SIGNIFICANT CHANGE UP (ref 0.2–1.2)
BUN SERPL-MCNC: 12 MG/DL — SIGNIFICANT CHANGE UP (ref 7–23)
CALCIUM SERPL-MCNC: 9.1 MG/DL — SIGNIFICANT CHANGE UP (ref 8.4–10.5)
CHLORIDE SERPL-SCNC: 105 MMOL/L — SIGNIFICANT CHANGE UP (ref 98–107)
CO2 SERPL-SCNC: 22 MMOL/L — SIGNIFICANT CHANGE UP (ref 22–31)
CREAT SERPL-MCNC: 0.64 MG/DL — SIGNIFICANT CHANGE UP (ref 0.5–1.3)
EGFR: 114 ML/MIN/1.73M2 — SIGNIFICANT CHANGE UP
GLUCOSE SERPL-MCNC: 83 MG/DL — SIGNIFICANT CHANGE UP (ref 70–99)
HCT VFR BLD CALC: 30.8 % — LOW (ref 34.5–45)
HGB BLD-MCNC: 8.8 G/DL — LOW (ref 11.5–15.5)
MCHC RBC-ENTMCNC: 18.4 PG — LOW (ref 27–34)
MCHC RBC-ENTMCNC: 28.6 GM/DL — LOW (ref 32–36)
MCV RBC AUTO: 64.3 FL — LOW (ref 80–100)
NRBC # BLD: 0 /100 WBCS — SIGNIFICANT CHANGE UP (ref 0–0)
NRBC # FLD: 0 K/UL — SIGNIFICANT CHANGE UP (ref 0–0)
PLATELET # BLD AUTO: 258 K/UL — SIGNIFICANT CHANGE UP (ref 150–400)
POTASSIUM SERPL-MCNC: 4.4 MMOL/L — SIGNIFICANT CHANGE UP (ref 3.5–5.3)
POTASSIUM SERPL-SCNC: 4.4 MMOL/L — SIGNIFICANT CHANGE UP (ref 3.5–5.3)
PROT SERPL-MCNC: 6.9 G/DL — SIGNIFICANT CHANGE UP (ref 6–8.3)
RBC # BLD: 4.79 M/UL — SIGNIFICANT CHANGE UP (ref 3.8–5.2)
RBC # FLD: 27.9 % — HIGH (ref 10.3–14.5)
SODIUM SERPL-SCNC: 140 MMOL/L — SIGNIFICANT CHANGE UP (ref 135–145)
VALPROATE SERPL-MCNC: 52.6 UG/ML — SIGNIFICANT CHANGE UP (ref 50–100)
WBC # BLD: 7.19 K/UL — SIGNIFICANT CHANGE UP (ref 3.8–10.5)
WBC # FLD AUTO: 7.19 K/UL — SIGNIFICANT CHANGE UP (ref 3.8–10.5)

## 2024-07-23 RX ORDER — DIVALPROEX SODIUM 125 MG/1
1000 CAPSULE, COATED PELLETS ORAL AT BEDTIME
Refills: 0 | Status: DISCONTINUED | OUTPATIENT
Start: 2024-07-23 | End: 2024-08-08

## 2024-07-23 RX ORDER — CLONAZEPAM 0.5 MG/1
0.5 TABLET ORAL THREE TIMES A DAY
Refills: 0 | Status: DISCONTINUED | OUTPATIENT
Start: 2024-07-23 | End: 2024-07-30

## 2024-07-23 RX ADMIN — Medication 1 MILLIGRAM(S): at 08:40

## 2024-07-23 RX ADMIN — CLONAZEPAM 0.5 MILLIGRAM(S): 0.5 TABLET ORAL at 12:21

## 2024-07-23 RX ADMIN — CLONAZEPAM 0.5 MILLIGRAM(S): 0.5 TABLET ORAL at 08:39

## 2024-07-23 RX ADMIN — Medication 2 MILLIGRAM(S): at 14:23

## 2024-07-23 RX ADMIN — CLONAZEPAM 0.5 MILLIGRAM(S): 0.5 TABLET ORAL at 21:14

## 2024-07-23 RX ADMIN — Medication 1 PATCH: at 09:24

## 2024-07-23 RX ADMIN — Medication 10 MILLIGRAM(S): at 08:39

## 2024-07-23 RX ADMIN — Medication 20 MILLIGRAM(S): at 21:14

## 2024-07-23 RX ADMIN — Medication 3 MILLIGRAM(S): at 21:14

## 2024-07-23 RX ADMIN — Medication 325 MILLIGRAM(S): at 08:40

## 2024-07-23 RX ADMIN — Medication 1 TABLET(S): at 08:40

## 2024-07-23 RX ADMIN — DIVALPROEX SODIUM 1000 MILLIGRAM(S): 125 CAPSULE, COATED PELLETS ORAL at 21:14

## 2024-07-23 RX ADMIN — Medication 81 MILLIGRAM(S): at 08:39

## 2024-07-23 NOTE — BH INPATIENT PSYCHIATRY PROGRESS NOTE - NSBHMETABOLIC_PSY_ALL_CORE_FT
BMI: BMI (kg/m2): 22.3 (07-12-24 @ 16:04)  HbA1c: A1C with Estimated Average Glucose Result: 5.0 % (07-03-24 @ 06:45)    Glucose:   BP: --Vital Signs Last 24 Hrs  T(C): 36.3 (07-23-24 @ 08:39), Max: 36.3 (07-23-24 @ 08:39)  T(F): 97.3 (07-23-24 @ 08:39), Max: 97.3 (07-23-24 @ 08:39)  HR: --  BP: --  BP(mean): --  RR: 17 (07-23-24 @ 08:39) (17 - 17)  SpO2: --    Orthostatic VS  07-23-24 @ 08:39  Lying BP: --/-- HR: --  Sitting BP: 127/61 HR: 75  Standing BP: 131/73 HR: 78  Site: --  Mode: --  Orthostatic VS  07-22-24 @ 07:30  Lying BP: --/-- HR: --  Sitting BP: 118/62 HR: 68  Standing BP: 111/60 HR: 76  Site: --  Mode: --    Lipid Panel: Date/Time: 07-03-24 @ 06:45  Cholesterol, Serum: 113  LDL Cholesterol Calculated: 54  HDL Cholesterol, Serum: 51  Total Cholesterol/HDL Ration Measurement: --  Triglycerides, Serum: 40

## 2024-07-23 NOTE — BH INPATIENT PSYCHIATRY PROGRESS NOTE - ATTENDING COMMENTS
Care was discussed and reviewed in the interdisciplinary treatment team.  I, Amanda Rizzo MD, have reviewed and verified the documentation.  I independently performed the documented medical decision making.      Care was discussed and reviewed in the interdisciplinary treatment team.  I, Amanda Rizzo MD, have reviewed and verified the documentation.  I independently performed the documented medical decision making.    Patient reported that she is feeling better. She has been calm and cooperative. Patient is aware that we will start looking for outpatient services. We still need to clarify if she will be able to return home or not due to the order of protection.
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
## 2024-07-23 NOTE — BH INPATIENT PSYCHIATRY PROGRESS NOTE - OTHER
"good" oddly related  paranoid  improving   improving  easier to interrupt, less rapid  concrete, disorganized - improving  tearful

## 2024-07-23 NOTE — BH INPATIENT PSYCHIATRY PROGRESS NOTE - CURRENT MEDICATION
MEDICATIONS  (STANDING):  artificial tears (preservative free) Ophthalmic Solution 1 Drop(s) Both EYES daily  aspirin enteric coated 81 milliGRAM(s) Oral daily  clonazePAM  Tablet 0.5 milliGRAM(s) Oral three times a day  divalproex ER 1000 milliGRAM(s) Oral at bedtime  ferrous    sulfate 325 milliGRAM(s) Oral daily  folic acid 1 milliGRAM(s) Oral daily  melatonin. 3 milliGRAM(s) Oral at bedtime  methimazole 5 milliGRAM(s) Oral daily  multivitamin 1 Tablet(s) Oral daily  nicotine -   7 mG/24Hr(s) Patch 1 Patch Transdermal daily  OLANZapine 20 milliGRAM(s) Oral at bedtime  OLANZapine 10 milliGRAM(s) Oral daily    MEDICATIONS  (PRN):  acetaminophen     Tablet .. 650 milliGRAM(s) Oral every 6 hours PRN Temp greater or equal to 38C (100.4F), Mild Pain (1 - 3)  diphenhydrAMINE 50 milliGRAM(s) Oral every 6 hours PRN EPS  diphenhydrAMINE Injectable 50 milliGRAM(s) IntraMuscular once PRN EPS from PRN for severe agitation  hydrOXYzine hydrochloride 50 milliGRAM(s) Oral every 6 hours PRN anxiety  nicotine  Polacrilex Gum 2 milliGRAM(s) Oral every 2 hours PRN Smoking Cessation  OLANZapine 5 milliGRAM(s) Oral every 6 hours PRN agitation  OLANZapine Injectable 5 milliGRAM(s) IntraMuscular once PRN severe agitation  sodium chloride 0.65% Nasal 1 Spray(s) Both Nostrils every 6 hours PRN Nasal Congestion   MEDICATIONS  (STANDING):  artificial tears (preservative free) Ophthalmic Solution 1 Drop(s) Both EYES daily  aspirin enteric coated 81 milliGRAM(s) Oral daily  clonazePAM  Tablet 0.5 milliGRAM(s) Oral three times a day  divalproex ER 1000 milliGRAM(s) Oral at bedtime  ferrous    sulfate 325 milliGRAM(s) Oral daily  folic acid 1 milliGRAM(s) Oral daily  melatonin. 3 milliGRAM(s) Oral at bedtime  methimazole 5 milliGRAM(s) Oral daily  multivitamin 1 Tablet(s) Oral daily  nicotine -   7 mG/24Hr(s) Patch 1 Patch Transdermal daily  OLANZapine 10 milliGRAM(s) Oral daily  OLANZapine 20 milliGRAM(s) Oral at bedtime    MEDICATIONS  (PRN):  acetaminophen     Tablet .. 650 milliGRAM(s) Oral every 6 hours PRN Temp greater or equal to 38C (100.4F), Mild Pain (1 - 3)  diphenhydrAMINE 50 milliGRAM(s) Oral every 6 hours PRN EPS  diphenhydrAMINE Injectable 50 milliGRAM(s) IntraMuscular once PRN EPS from PRN for severe agitation  hydrOXYzine hydrochloride 50 milliGRAM(s) Oral every 6 hours PRN anxiety  nicotine  Polacrilex Gum 2 milliGRAM(s) Oral every 2 hours PRN Smoking Cessation  OLANZapine 5 milliGRAM(s) Oral every 6 hours PRN agitation  OLANZapine Injectable 5 milliGRAM(s) IntraMuscular once PRN severe agitation  sodium chloride 0.65% Nasal 1 Spray(s) Both Nostrils every 6 hours PRN Nasal Congestion

## 2024-07-24 RX ADMIN — Medication 2 MILLIGRAM(S): at 09:17

## 2024-07-24 RX ADMIN — Medication 1 PATCH: at 09:17

## 2024-07-24 RX ADMIN — Medication 1 TABLET(S): at 09:17

## 2024-07-24 RX ADMIN — Medication 81 MILLIGRAM(S): at 09:17

## 2024-07-24 RX ADMIN — Medication 325 MILLIGRAM(S): at 09:16

## 2024-07-24 RX ADMIN — Medication 10 MILLIGRAM(S): at 09:17

## 2024-07-24 RX ADMIN — CLONAZEPAM 0.5 MILLIGRAM(S): 0.5 TABLET ORAL at 20:22

## 2024-07-24 RX ADMIN — DIVALPROEX SODIUM 1000 MILLIGRAM(S): 125 CAPSULE, COATED PELLETS ORAL at 20:22

## 2024-07-24 RX ADMIN — Medication 20 MILLIGRAM(S): at 20:24

## 2024-07-24 RX ADMIN — Medication 1 DROP(S): at 09:15

## 2024-07-24 RX ADMIN — Medication 1 MILLIGRAM(S): at 09:16

## 2024-07-24 RX ADMIN — CLONAZEPAM 0.5 MILLIGRAM(S): 0.5 TABLET ORAL at 09:16

## 2024-07-24 RX ADMIN — Medication 2 MILLIGRAM(S): at 16:54

## 2024-07-24 RX ADMIN — Medication 3 MILLIGRAM(S): at 20:22

## 2024-07-24 RX ADMIN — CLONAZEPAM 0.5 MILLIGRAM(S): 0.5 TABLET ORAL at 13:00

## 2024-07-24 NOTE — BH INPATIENT PSYCHIATRY PROGRESS NOTE - CURRENT MEDICATION
MEDICATIONS  (STANDING):  artificial tears (preservative free) Ophthalmic Solution 1 Drop(s) Both EYES daily  aspirin enteric coated 81 milliGRAM(s) Oral daily  clonazePAM  Tablet 0.5 milliGRAM(s) Oral three times a day  divalproex ER 1000 milliGRAM(s) Oral at bedtime  ferrous    sulfate 325 milliGRAM(s) Oral daily  folic acid 1 milliGRAM(s) Oral daily  melatonin. 3 milliGRAM(s) Oral at bedtime  methimazole 5 milliGRAM(s) Oral daily  multivitamin 1 Tablet(s) Oral daily  nicotine -   7 mG/24Hr(s) Patch 1 Patch Transdermal daily  OLANZapine 10 milliGRAM(s) Oral daily  OLANZapine 20 milliGRAM(s) Oral at bedtime    MEDICATIONS  (PRN):  acetaminophen     Tablet .. 650 milliGRAM(s) Oral every 6 hours PRN Temp greater or equal to 38C (100.4F), Mild Pain (1 - 3)  diphenhydrAMINE 50 milliGRAM(s) Oral every 6 hours PRN EPS  diphenhydrAMINE Injectable 50 milliGRAM(s) IntraMuscular once PRN EPS from PRN for severe agitation  hydrOXYzine hydrochloride 50 milliGRAM(s) Oral every 6 hours PRN anxiety  nicotine  Polacrilex Gum 2 milliGRAM(s) Oral every 2 hours PRN Smoking Cessation  OLANZapine 5 milliGRAM(s) Oral every 6 hours PRN agitation  OLANZapine Injectable 5 milliGRAM(s) IntraMuscular once PRN severe agitation  sodium chloride 0.65% Nasal 1 Spray(s) Both Nostrils every 6 hours PRN Nasal Congestion

## 2024-07-24 NOTE — BH INPATIENT PSYCHIATRY PROGRESS NOTE - OTHER
paranoid  improving   improving  tearful  easier to interrupt, less rapid  "good" concrete, disorganized - improving
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
## 2024-07-24 NOTE — BH INPATIENT PSYCHIATRY PROGRESS NOTE - NSBHMETABOLIC_PSY_ALL_CORE_FT
BMI: BMI (kg/m2): 22.3 (07-12-24 @ 16:04)  HbA1c: A1C with Estimated Average Glucose Result: 5.0 % (07-03-24 @ 06:45)    Glucose:   BP: --Vital Signs Last 24 Hrs  T(C): 36.2 (07-24-24 @ 07:33), Max: 36.2 (07-24-24 @ 07:33)  T(F): 97.1 (07-24-24 @ 07:33), Max: 97.1 (07-24-24 @ 07:33)  HR: --  BP: --  BP(mean): --  RR: 19 (07-24-24 @ 07:33) (19 - 19)  SpO2: --    Orthostatic VS  07-24-24 @ 07:33  Lying BP: --/-- HR: --  Sitting BP: 124/64 HR: 69  Standing BP: 127/70 HR: 79  Site: --  Mode: --  Orthostatic VS  07-23-24 @ 08:39  Lying BP: --/-- HR: --  Sitting BP: 127/61 HR: 75  Standing BP: 131/73 HR: 78  Site: --  Mode: --    Lipid Panel: Date/Time: 07-03-24 @ 06:45  Cholesterol, Serum: 113  LDL Cholesterol Calculated: 54  HDL Cholesterol, Serum: 51  Total Cholesterol/HDL Ration Measurement: --  Triglycerides, Serum: 40

## 2024-07-25 RX ADMIN — Medication 3 MILLIGRAM(S): at 20:43

## 2024-07-25 RX ADMIN — Medication 1 DROP(S): at 09:59

## 2024-07-25 RX ADMIN — Medication 1 PATCH: at 09:57

## 2024-07-25 RX ADMIN — Medication 10 MILLIGRAM(S): at 10:00

## 2024-07-25 RX ADMIN — Medication 1 PATCH: at 09:47

## 2024-07-25 RX ADMIN — CLONAZEPAM 0.5 MILLIGRAM(S): 0.5 TABLET ORAL at 20:42

## 2024-07-25 RX ADMIN — Medication 50 MILLIGRAM(S): at 23:00

## 2024-07-25 RX ADMIN — DIVALPROEX SODIUM 1000 MILLIGRAM(S): 125 CAPSULE, COATED PELLETS ORAL at 20:42

## 2024-07-25 RX ADMIN — CLONAZEPAM 0.5 MILLIGRAM(S): 0.5 TABLET ORAL at 09:59

## 2024-07-25 RX ADMIN — Medication 81 MILLIGRAM(S): at 09:59

## 2024-07-25 RX ADMIN — Medication 325 MILLIGRAM(S): at 09:59

## 2024-07-25 RX ADMIN — Medication 1 MILLIGRAM(S): at 09:59

## 2024-07-25 RX ADMIN — CLONAZEPAM 0.5 MILLIGRAM(S): 0.5 TABLET ORAL at 12:47

## 2024-07-25 RX ADMIN — Medication 2 MILLIGRAM(S): at 22:02

## 2024-07-25 RX ADMIN — Medication 2 MILLIGRAM(S): at 09:57

## 2024-07-25 RX ADMIN — Medication 20 MILLIGRAM(S): at 20:44

## 2024-07-25 RX ADMIN — Medication 1 TABLET(S): at 09:58

## 2024-07-25 NOTE — BH INPATIENT PSYCHIATRY PROGRESS NOTE - NSBHMETABOLIC_PSY_ALL_CORE_FT
BMI: BMI (kg/m2): 22.3 (07-12-24 @ 16:04)  HbA1c: A1C with Estimated Average Glucose Result: 5.0 % (07-03-24 @ 06:45)    Glucose:   BP: --Vital Signs Last 24 Hrs  T(C): 36.6 (07-25-24 @ 07:21), Max: 36.6 (07-25-24 @ 07:21)  T(F): 97.8 (07-25-24 @ 07:21), Max: 97.8 (07-25-24 @ 07:21)  HR: --  BP: --  BP(mean): --  RR: 18 (07-25-24 @ 07:21) (18 - 18)  SpO2: --    Orthostatic VS  07-25-24 @ 07:21  Lying BP: --/-- HR: --  Sitting BP: 131/57 HR: 74  Standing BP: 135/72 HR: 79  Site: --  Mode: --  Orthostatic VS  07-24-24 @ 07:33  Lying BP: --/-- HR: --  Sitting BP: 124/64 HR: 69  Standing BP: 127/70 HR: 79  Site: --  Mode: --    Lipid Panel: Date/Time: 07-03-24 @ 06:45  Cholesterol, Serum: 113  LDL Cholesterol Calculated: 54  HDL Cholesterol, Serum: 51  Total Cholesterol/HDL Ration Measurement: --  Triglycerides, Serum: 40

## 2024-07-25 NOTE — BH INPATIENT PSYCHIATRY PROGRESS NOTE - OTHER
improving   easier to interrupt, less rapid  improving  concrete, disorganized - improving  "good" tearful
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
## 2024-07-26 RX ADMIN — CLONAZEPAM 0.5 MILLIGRAM(S): 0.5 TABLET ORAL at 09:33

## 2024-07-26 RX ADMIN — CLONAZEPAM 0.5 MILLIGRAM(S): 0.5 TABLET ORAL at 12:37

## 2024-07-26 RX ADMIN — Medication 50 MILLIGRAM(S): at 00:29

## 2024-07-26 RX ADMIN — Medication 1 DROP(S): at 09:33

## 2024-07-26 RX ADMIN — Medication 325 MILLIGRAM(S): at 09:34

## 2024-07-26 RX ADMIN — Medication 20 MILLIGRAM(S): at 21:08

## 2024-07-26 RX ADMIN — Medication 81 MILLIGRAM(S): at 09:34

## 2024-07-26 RX ADMIN — Medication 1 MILLIGRAM(S): at 09:35

## 2024-07-26 RX ADMIN — Medication 3 MILLIGRAM(S): at 21:07

## 2024-07-26 RX ADMIN — DIVALPROEX SODIUM 1000 MILLIGRAM(S): 125 CAPSULE, COATED PELLETS ORAL at 21:07

## 2024-07-26 RX ADMIN — CLONAZEPAM 0.5 MILLIGRAM(S): 0.5 TABLET ORAL at 21:07

## 2024-07-26 RX ADMIN — Medication 10 MILLIGRAM(S): at 09:34

## 2024-07-26 RX ADMIN — Medication 1 TABLET(S): at 09:33

## 2024-07-26 RX ADMIN — Medication 1 PATCH: at 09:33

## 2024-07-26 RX ADMIN — Medication 2 MILLIGRAM(S): at 09:35

## 2024-07-26 NOTE — BH INPATIENT PSYCHIATRY PROGRESS NOTE - NSBHMETABOLIC_PSY_ALL_CORE_FT
BMI: BMI (kg/m2): 22.3 (07-12-24 @ 16:04)  HbA1c: A1C with Estimated Average Glucose Result: 5.0 % (07-03-24 @ 06:45)    Glucose:   BP: --Vital Signs Last 24 Hrs  T(C): 36.4 (07-26-24 @ 07:50), Max: 36.4 (07-26-24 @ 07:48)  T(F): 97.5 (07-26-24 @ 07:50), Max: 97.5 (07-26-24 @ 07:48)  HR: --  BP: --  BP(mean): --  RR: 20 (07-26-24 @ 07:50) (20 - 20)  SpO2: --    Orthostatic VS  07-26-24 @ 07:50  Lying BP: --/-- HR: --  Sitting BP: 128/79 HR: 77  Standing BP: 124/80 HR: 74  Site: --  Mode: --  Orthostatic VS  07-26-24 @ 07:48  Lying BP: --/-- HR: --  Sitting BP: 128/79 HR: 77  Standing BP: 124/80 HR: 74  Site: --  Mode: --  Orthostatic VS  07-25-24 @ 07:21  Lying BP: --/-- HR: --  Sitting BP: 131/57 HR: 74  Standing BP: 135/72 HR: 79  Site: --  Mode: --    Lipid Panel: Date/Time: 07-03-24 @ 06:45  Cholesterol, Serum: 113  LDL Cholesterol Calculated: 54  HDL Cholesterol, Serum: 51  Total Cholesterol/HDL Ration Measurement: --  Triglycerides, Serum: 40
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
## 2024-07-27 RX ORDER — IBUPROFEN 200 MG
400 TABLET ORAL EVERY 8 HOURS
Refills: 0 | Status: DISCONTINUED | OUTPATIENT
Start: 2024-07-27 | End: 2024-08-08

## 2024-07-27 RX ADMIN — CLONAZEPAM 0.5 MILLIGRAM(S): 0.5 TABLET ORAL at 12:08

## 2024-07-27 RX ADMIN — Medication 325 MILLIGRAM(S): at 08:18

## 2024-07-27 RX ADMIN — CLONAZEPAM 0.5 MILLIGRAM(S): 0.5 TABLET ORAL at 08:18

## 2024-07-27 RX ADMIN — Medication 50 MILLIGRAM(S): at 22:23

## 2024-07-27 RX ADMIN — Medication 3 MILLIGRAM(S): at 20:40

## 2024-07-27 RX ADMIN — Medication 20 MILLIGRAM(S): at 20:41

## 2024-07-27 RX ADMIN — Medication 1 TABLET(S): at 08:18

## 2024-07-27 RX ADMIN — Medication 10 MILLIGRAM(S): at 08:18

## 2024-07-27 RX ADMIN — Medication 81 MILLIGRAM(S): at 08:17

## 2024-07-27 RX ADMIN — Medication 1 PATCH: at 08:17

## 2024-07-27 RX ADMIN — Medication 2 MILLIGRAM(S): at 08:18

## 2024-07-27 RX ADMIN — Medication 400 MILLIGRAM(S): at 11:00

## 2024-07-27 RX ADMIN — Medication 1 MILLIGRAM(S): at 08:18

## 2024-07-27 RX ADMIN — DIVALPROEX SODIUM 1000 MILLIGRAM(S): 125 CAPSULE, COATED PELLETS ORAL at 20:40

## 2024-07-27 RX ADMIN — CLONAZEPAM 0.5 MILLIGRAM(S): 0.5 TABLET ORAL at 20:40

## 2024-07-27 RX ADMIN — Medication 1 DROP(S): at 10:56

## 2024-07-28 RX ORDER — DIPHENHYDRAMINE HCL 25 MG
50 CAPSULE ORAL ONCE
Refills: 0 | Status: COMPLETED | OUTPATIENT
Start: 2024-07-28 | End: 2024-07-28

## 2024-07-28 RX ADMIN — Medication 325 MILLIGRAM(S): at 08:25

## 2024-07-28 RX ADMIN — Medication 1 MILLIGRAM(S): at 08:44

## 2024-07-28 RX ADMIN — Medication 3 MILLIGRAM(S): at 20:36

## 2024-07-28 RX ADMIN — Medication 20 MILLIGRAM(S): at 20:36

## 2024-07-28 RX ADMIN — Medication 50 MILLIGRAM(S): at 00:46

## 2024-07-28 RX ADMIN — CLONAZEPAM 0.5 MILLIGRAM(S): 0.5 TABLET ORAL at 20:36

## 2024-07-28 RX ADMIN — DIVALPROEX SODIUM 1000 MILLIGRAM(S): 125 CAPSULE, COATED PELLETS ORAL at 20:36

## 2024-07-28 RX ADMIN — Medication 1 DROP(S): at 08:43

## 2024-07-28 RX ADMIN — Medication 1 TABLET(S): at 08:26

## 2024-07-28 RX ADMIN — Medication 2 MILLIGRAM(S): at 12:49

## 2024-07-28 RX ADMIN — CLONAZEPAM 0.5 MILLIGRAM(S): 0.5 TABLET ORAL at 12:10

## 2024-07-28 RX ADMIN — Medication 10 MILLIGRAM(S): at 08:25

## 2024-07-28 RX ADMIN — CLONAZEPAM 0.5 MILLIGRAM(S): 0.5 TABLET ORAL at 08:25

## 2024-07-28 RX ADMIN — Medication 2 MILLIGRAM(S): at 08:25

## 2024-07-28 RX ADMIN — Medication 1 PATCH: at 08:43

## 2024-07-28 RX ADMIN — Medication 81 MILLIGRAM(S): at 08:25

## 2024-07-29 RX ADMIN — Medication 81 MILLIGRAM(S): at 08:36

## 2024-07-29 RX ADMIN — Medication 325 MILLIGRAM(S): at 08:37

## 2024-07-29 RX ADMIN — Medication 2 MILLIGRAM(S): at 13:37

## 2024-07-29 RX ADMIN — Medication 2 MILLIGRAM(S): at 03:20

## 2024-07-29 RX ADMIN — Medication 20 MILLIGRAM(S): at 20:18

## 2024-07-29 RX ADMIN — DIVALPROEX SODIUM 1000 MILLIGRAM(S): 125 CAPSULE, COATED PELLETS ORAL at 20:18

## 2024-07-29 RX ADMIN — CLONAZEPAM 0.5 MILLIGRAM(S): 0.5 TABLET ORAL at 08:36

## 2024-07-29 RX ADMIN — Medication 1 DROP(S): at 09:19

## 2024-07-29 RX ADMIN — Medication 1 PATCH: at 08:30

## 2024-07-29 RX ADMIN — Medication 50 MILLIGRAM(S): at 03:23

## 2024-07-29 RX ADMIN — Medication 2 MILLIGRAM(S): at 09:19

## 2024-07-29 RX ADMIN — Medication 1 TABLET(S): at 08:36

## 2024-07-29 RX ADMIN — CLONAZEPAM 0.5 MILLIGRAM(S): 0.5 TABLET ORAL at 20:17

## 2024-07-29 RX ADMIN — CLONAZEPAM 0.5 MILLIGRAM(S): 0.5 TABLET ORAL at 13:37

## 2024-07-29 RX ADMIN — Medication 1 PATCH: at 08:39

## 2024-07-29 RX ADMIN — Medication 3 MILLIGRAM(S): at 20:19

## 2024-07-29 RX ADMIN — Medication 650 MILLIGRAM(S): at 16:43

## 2024-07-29 RX ADMIN — Medication 1 MILLIGRAM(S): at 08:36

## 2024-07-29 RX ADMIN — Medication 10 MILLIGRAM(S): at 08:36

## 2024-07-29 NOTE — BH INPATIENT PSYCHIATRY PROGRESS NOTE - OTHER
improving  improving   easier to interrupt, less rapid  concrete, disorganized - improving  oddly related  "good"

## 2024-07-29 NOTE — BH INPATIENT PSYCHIATRY PROGRESS NOTE - CURRENT MEDICATION
MEDICATIONS  (STANDING):  artificial tears (preservative free) Ophthalmic Solution 1 Drop(s) Both EYES daily  aspirin enteric coated 81 milliGRAM(s) Oral daily  clonazePAM  Tablet 0.5 milliGRAM(s) Oral three times a day  divalproex ER 1000 milliGRAM(s) Oral at bedtime  ferrous    sulfate 325 milliGRAM(s) Oral daily  folic acid 1 milliGRAM(s) Oral daily  melatonin. 3 milliGRAM(s) Oral at bedtime  methimazole 5 milliGRAM(s) Oral daily  multivitamin 1 Tablet(s) Oral daily  nicotine -   7 mG/24Hr(s) Patch 1 Patch Transdermal daily  OLANZapine 10 milliGRAM(s) Oral daily  OLANZapine 20 milliGRAM(s) Oral at bedtime    MEDICATIONS  (PRN):  acetaminophen     Tablet .. 650 milliGRAM(s) Oral every 6 hours PRN Temp greater or equal to 38C (100.4F), Mild Pain (1 - 3)  diphenhydrAMINE 50 milliGRAM(s) Oral every 6 hours PRN EPS  diphenhydrAMINE Injectable 50 milliGRAM(s) IntraMuscular once PRN EPS from PRN for severe agitation  hydrOXYzine hydrochloride 50 milliGRAM(s) Oral every 6 hours PRN anxiety  ibuprofen  Tablet. 400 milliGRAM(s) Oral every 8 hours PRN Mild Pain (1 - 3), Moderate Pain (4 - 6)  nicotine  Polacrilex Gum 2 milliGRAM(s) Oral every 2 hours PRN Smoking Cessation  OLANZapine 5 milliGRAM(s) Oral every 6 hours PRN agitation  OLANZapine Injectable 5 milliGRAM(s) IntraMuscular once PRN severe agitation  sodium chloride 0.65% Nasal 1 Spray(s) Both Nostrils every 6 hours PRN Nasal Congestion

## 2024-07-29 NOTE — BH INPATIENT PSYCHIATRY PROGRESS NOTE - NSBHMETABOLIC_PSY_ALL_CORE_FT
BMI: BMI (kg/m2): 22.3 (07-12-24 @ 16:04)  HbA1c: A1C with Estimated Average Glucose Result: 5.0 % (07-03-24 @ 06:45)    Glucose:   BP: --Vital Signs Last 24 Hrs  T(C): 36.4 (07-29-24 @ 07:48), Max: 36.4 (07-29-24 @ 07:36)  T(F): 97.5 (07-29-24 @ 07:48), Max: 97.5 (07-29-24 @ 07:36)  HR: --  BP: --  BP(mean): --  RR: 18 (07-29-24 @ 07:48) (18 - 18)  SpO2: --    Orthostatic VS  07-29-24 @ 07:48  Lying BP: --/-- HR: --  Sitting BP: 133/77 HR: 70  Standing BP: 144/72 HR: 81  Site: --  Mode: --  Orthostatic VS  07-29-24 @ 07:36  Lying BP: --/-- HR: --  Sitting BP: 133/77 HR: 70  Standing BP: 144/72 HR: 81  Site: --  Mode: --  Orthostatic VS  07-28-24 @ 07:36  Lying BP: --/-- HR: --  Sitting BP: 123/70 HR: 78  Standing BP: 131/64 HR: 79  Site: --  Mode: --    Lipid Panel: Date/Time: 07-03-24 @ 06:45  Cholesterol, Serum: 113  LDL Cholesterol Calculated: 54  HDL Cholesterol, Serum: 51  Total Cholesterol/HDL Ration Measurement: --  Triglycerides, Serum: 40

## 2024-07-29 NOTE — BH INPATIENT PSYCHIATRY PROGRESS NOTE - NSBHMSEGROOM_PSY_A_CORE

## 2024-07-29 NOTE — BH INPATIENT PSYCHIATRY PROGRESS NOTE - ATTENDING COMMENTS
Care was discussed and reviewed in the interdisciplinary treatment team.  I, Amanda Rizzo MD, have reviewed and verified the documentation.  I independently performed the documented medical decision making.    Patient reported that she is feeling better. She has been calm and cooperative. Patient is aware that we will start looking for outpatient services. We still need to clarify if she will be able to return home or not due to the order of protection.       Care was discussed and reviewed in the interdisciplinary treatment team.  I, Amanda Rizzo MD, have reviewed and verified the documentation.  I independently performed the documented medical decision making.
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
## 2024-07-30 LAB
ALBUMIN SERPL ELPH-MCNC: 3.4 G/DL — SIGNIFICANT CHANGE UP (ref 3.3–5)
ALP SERPL-CCNC: 73 U/L — SIGNIFICANT CHANGE UP (ref 40–120)
ALT FLD-CCNC: 22 U/L — SIGNIFICANT CHANGE UP (ref 4–33)
ANION GAP SERPL CALC-SCNC: 13 MMOL/L — SIGNIFICANT CHANGE UP (ref 7–14)
AST SERPL-CCNC: 21 U/L — SIGNIFICANT CHANGE UP (ref 4–32)
BILIRUB SERPL-MCNC: 0.4 MG/DL — SIGNIFICANT CHANGE UP (ref 0.2–1.2)
BUN SERPL-MCNC: 19 MG/DL — SIGNIFICANT CHANGE UP (ref 7–23)
CALCIUM SERPL-MCNC: 9 MG/DL — SIGNIFICANT CHANGE UP (ref 8.4–10.5)
CHLORIDE SERPL-SCNC: 105 MMOL/L — SIGNIFICANT CHANGE UP (ref 98–107)
CO2 SERPL-SCNC: 22 MMOL/L — SIGNIFICANT CHANGE UP (ref 22–31)
CREAT SERPL-MCNC: 0.64 MG/DL — SIGNIFICANT CHANGE UP (ref 0.5–1.3)
EGFR: 114 ML/MIN/1.73M2 — SIGNIFICANT CHANGE UP
GLUCOSE SERPL-MCNC: 79 MG/DL — SIGNIFICANT CHANGE UP (ref 70–99)
HCT VFR BLD CALC: 31.3 % — LOW (ref 34.5–45)
HGB BLD-MCNC: 9.4 G/DL — LOW (ref 11.5–15.5)
MCHC RBC-ENTMCNC: 19.5 PG — LOW (ref 27–34)
MCHC RBC-ENTMCNC: 30 GM/DL — LOW (ref 32–36)
MCV RBC AUTO: 65.1 FL — LOW (ref 80–100)
NRBC # BLD: 0 /100 WBCS — SIGNIFICANT CHANGE UP (ref 0–0)
NRBC # FLD: 0 K/UL — SIGNIFICANT CHANGE UP (ref 0–0)
PLATELET # BLD AUTO: 225 K/UL — SIGNIFICANT CHANGE UP (ref 150–400)
POTASSIUM SERPL-MCNC: 4.1 MMOL/L — SIGNIFICANT CHANGE UP (ref 3.5–5.3)
POTASSIUM SERPL-SCNC: 4.1 MMOL/L — SIGNIFICANT CHANGE UP (ref 3.5–5.3)
PROT SERPL-MCNC: 7 G/DL — SIGNIFICANT CHANGE UP (ref 6–8.3)
RBC # BLD: 4.81 M/UL — SIGNIFICANT CHANGE UP (ref 3.8–5.2)
RBC # FLD: 28.6 % — HIGH (ref 10.3–14.5)
SODIUM SERPL-SCNC: 140 MMOL/L — SIGNIFICANT CHANGE UP (ref 135–145)
VALPROATE SERPL-MCNC: 49.5 UG/ML — LOW (ref 50–100)
WBC # BLD: 6.9 K/UL — SIGNIFICANT CHANGE UP (ref 3.8–10.5)
WBC # FLD AUTO: 6.9 K/UL — SIGNIFICANT CHANGE UP (ref 3.8–10.5)

## 2024-07-30 RX ORDER — CLONAZEPAM 0.5 MG/1
0.5 TABLET ORAL THREE TIMES A DAY
Refills: 0 | Status: DISCONTINUED | OUTPATIENT
Start: 2024-07-30 | End: 2024-07-31

## 2024-07-30 RX ADMIN — CLONAZEPAM 0.5 MILLIGRAM(S): 0.5 TABLET ORAL at 08:35

## 2024-07-30 RX ADMIN — CLONAZEPAM 0.5 MILLIGRAM(S): 0.5 TABLET ORAL at 12:39

## 2024-07-30 RX ADMIN — Medication 1 PATCH: at 09:53

## 2024-07-30 RX ADMIN — Medication 10 MILLIGRAM(S): at 08:34

## 2024-07-30 RX ADMIN — Medication 1 PATCH: at 21:30

## 2024-07-30 RX ADMIN — Medication 1 TABLET(S): at 08:35

## 2024-07-30 RX ADMIN — Medication 325 MILLIGRAM(S): at 08:35

## 2024-07-30 RX ADMIN — Medication 81 MILLIGRAM(S): at 08:35

## 2024-07-30 RX ADMIN — CLONAZEPAM 0.5 MILLIGRAM(S): 0.5 TABLET ORAL at 20:29

## 2024-07-30 RX ADMIN — Medication 3 MILLIGRAM(S): at 20:29

## 2024-07-30 RX ADMIN — Medication 1 MILLIGRAM(S): at 08:35

## 2024-07-30 RX ADMIN — DIVALPROEX SODIUM 1000 MILLIGRAM(S): 125 CAPSULE, COATED PELLETS ORAL at 20:28

## 2024-07-30 RX ADMIN — Medication 20 MILLIGRAM(S): at 20:28

## 2024-07-30 NOTE — BH INPATIENT PSYCHIATRY PROGRESS NOTE - CURRENT MEDICATION
MEDICATIONS  (STANDING):  artificial tears (preservative free) Ophthalmic Solution 1 Drop(s) Both EYES daily  aspirin enteric coated 81 milliGRAM(s) Oral daily  clonazePAM  Tablet 0.5 milliGRAM(s) Oral three times a day  divalproex ER 1000 milliGRAM(s) Oral at bedtime  ferrous    sulfate 325 milliGRAM(s) Oral daily  folic acid 1 milliGRAM(s) Oral daily  melatonin. 3 milliGRAM(s) Oral at bedtime  methimazole 5 milliGRAM(s) Oral daily  multivitamin 1 Tablet(s) Oral daily  nicotine -   7 mG/24Hr(s) Patch 1 Patch Transdermal daily  OLANZapine 20 milliGRAM(s) Oral at bedtime  OLANZapine 10 milliGRAM(s) Oral daily    MEDICATIONS  (PRN):  acetaminophen     Tablet .. 650 milliGRAM(s) Oral every 6 hours PRN Temp greater or equal to 38C (100.4F), Mild Pain (1 - 3)  diphenhydrAMINE 50 milliGRAM(s) Oral every 6 hours PRN EPS  diphenhydrAMINE Injectable 50 milliGRAM(s) IntraMuscular once PRN EPS from PRN for severe agitation  hydrOXYzine hydrochloride 50 milliGRAM(s) Oral every 6 hours PRN anxiety  ibuprofen  Tablet. 400 milliGRAM(s) Oral every 8 hours PRN Mild Pain (1 - 3), Moderate Pain (4 - 6)  nicotine  Polacrilex Gum 2 milliGRAM(s) Oral every 2 hours PRN Smoking Cessation  OLANZapine 5 milliGRAM(s) Oral every 6 hours PRN agitation  OLANZapine Injectable 5 milliGRAM(s) IntraMuscular once PRN severe agitation  sodium chloride 0.65% Nasal 1 Spray(s) Both Nostrils every 6 hours PRN Nasal Congestion

## 2024-07-30 NOTE — BH INPATIENT PSYCHIATRY PROGRESS NOTE - NSBHMETABOLIC_PSY_ALL_CORE_FT
BMI: BMI (kg/m2): 22.3 (07-12-24 @ 16:04)  HbA1c: A1C with Estimated Average Glucose Result: 5.0 % (07-03-24 @ 06:45)    Glucose:   BP: --Vital Signs Last 24 Hrs  T(C): 36.6 (07-30-24 @ 08:28), Max: 36.6 (07-30-24 @ 08:28)  T(F): 97.8 (07-30-24 @ 08:28), Max: 97.8 (07-30-24 @ 08:28)  HR: --  BP: --  BP(mean): --  RR: --  SpO2: --    Orthostatic VS  07-30-24 @ 08:28  Lying BP: --/-- HR: --  Sitting BP: 124/70 HR: 76  Standing BP: 127/78 HR: 81  Site: --  Mode: --  Orthostatic VS  07-29-24 @ 22:49  Lying BP: --/-- HR: --  Sitting BP: 138/60 HR: 84  Standing BP: 139/73 HR: 85  Site: --  Mode: --  Orthostatic VS  07-29-24 @ 07:48  Lying BP: --/-- HR: --  Sitting BP: 133/77 HR: 70  Standing BP: 144/72 HR: 81  Site: --  Mode: --  Orthostatic VS  07-29-24 @ 07:36  Lying BP: --/-- HR: --  Sitting BP: 133/77 HR: 70  Standing BP: 144/72 HR: 81  Site: --  Mode: --    Lipid Panel: Date/Time: 07-03-24 @ 06:45  Cholesterol, Serum: 113  LDL Cholesterol Calculated: 54  HDL Cholesterol, Serum: 51  Total Cholesterol/HDL Ration Measurement: --  Triglycerides, Serum: 40
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
## 2024-07-30 NOTE — BH INPATIENT PSYCHIATRY PROGRESS NOTE - OTHER
improving   easier to interrupt, less rapid  improving  "good" concrete, disorganized - improving  oddly related

## 2024-07-31 RX ORDER — CLONAZEPAM 0.5 MG/1
0.5 TABLET ORAL
Refills: 0 | Status: DISCONTINUED | OUTPATIENT
Start: 2024-07-31 | End: 2024-08-05

## 2024-07-31 RX ADMIN — Medication 81 MILLIGRAM(S): at 08:32

## 2024-07-31 RX ADMIN — CLONAZEPAM 0.5 MILLIGRAM(S): 0.5 TABLET ORAL at 12:53

## 2024-07-31 RX ADMIN — Medication 1 TABLET(S): at 08:33

## 2024-07-31 RX ADMIN — CLONAZEPAM 0.5 MILLIGRAM(S): 0.5 TABLET ORAL at 08:34

## 2024-07-31 RX ADMIN — DIVALPROEX SODIUM 1000 MILLIGRAM(S): 125 CAPSULE, COATED PELLETS ORAL at 21:12

## 2024-07-31 RX ADMIN — Medication 2 MILLIGRAM(S): at 09:14

## 2024-07-31 RX ADMIN — Medication 20 MILLIGRAM(S): at 21:12

## 2024-07-31 RX ADMIN — CLONAZEPAM 0.5 MILLIGRAM(S): 0.5 TABLET ORAL at 21:12

## 2024-07-31 RX ADMIN — Medication 3 MILLIGRAM(S): at 21:12

## 2024-07-31 RX ADMIN — Medication 325 MILLIGRAM(S): at 08:32

## 2024-07-31 RX ADMIN — CLONAZEPAM 0.5 MILLIGRAM(S): 0.5 TABLET ORAL at 10:06

## 2024-07-31 RX ADMIN — Medication 1 PATCH: at 08:32

## 2024-07-31 RX ADMIN — Medication 10 MILLIGRAM(S): at 08:33

## 2024-07-31 RX ADMIN — Medication 1 MILLIGRAM(S): at 08:33

## 2024-07-31 NOTE — BH INPATIENT PSYCHIATRY PROGRESS NOTE - CURRENT MEDICATION
MEDICATIONS  (STANDING):  artificial tears (preservative free) Ophthalmic Solution 1 Drop(s) Both EYES daily  aspirin enteric coated 81 milliGRAM(s) Oral daily  clonazePAM  Tablet 0.5 milliGRAM(s) Oral three times a day  divalproex ER 1000 milliGRAM(s) Oral at bedtime  ferrous    sulfate 325 milliGRAM(s) Oral daily  folic acid 1 milliGRAM(s) Oral daily  melatonin. 3 milliGRAM(s) Oral at bedtime  methimazole 5 milliGRAM(s) Oral daily  multivitamin 1 Tablet(s) Oral daily  nicotine -   7 mG/24Hr(s) Patch 1 Patch Transdermal daily  OLANZapine 10 milliGRAM(s) Oral daily  OLANZapine 20 milliGRAM(s) Oral at bedtime    MEDICATIONS  (PRN):  acetaminophen     Tablet .. 650 milliGRAM(s) Oral every 6 hours PRN Temp greater or equal to 38C (100.4F), Mild Pain (1 - 3)  diphenhydrAMINE 50 milliGRAM(s) Oral every 6 hours PRN EPS  diphenhydrAMINE Injectable 50 milliGRAM(s) IntraMuscular once PRN EPS from PRN for severe agitation  hydrOXYzine hydrochloride 50 milliGRAM(s) Oral every 6 hours PRN anxiety  ibuprofen  Tablet. 400 milliGRAM(s) Oral every 8 hours PRN Mild Pain (1 - 3), Moderate Pain (4 - 6)  nicotine  Polacrilex Gum 2 milliGRAM(s) Oral every 2 hours PRN Smoking Cessation  OLANZapine 5 milliGRAM(s) Oral every 6 hours PRN agitation  OLANZapine Injectable 5 milliGRAM(s) IntraMuscular once PRN severe agitation  sodium chloride 0.65% Nasal 1 Spray(s) Both Nostrils every 6 hours PRN Nasal Congestion   MEDICATIONS  (STANDING):  artificial tears (preservative free) Ophthalmic Solution 1 Drop(s) Both EYES daily  aspirin enteric coated 81 milliGRAM(s) Oral daily  clonazePAM  Tablet 0.5 milliGRAM(s) Oral two times a day  divalproex ER 1000 milliGRAM(s) Oral at bedtime  ferrous    sulfate 325 milliGRAM(s) Oral daily  folic acid 1 milliGRAM(s) Oral daily  melatonin. 3 milliGRAM(s) Oral at bedtime  methimazole 5 milliGRAM(s) Oral daily  multivitamin 1 Tablet(s) Oral daily  nicotine -   7 mG/24Hr(s) Patch 1 Patch Transdermal daily  OLANZapine 10 milliGRAM(s) Oral daily  OLANZapine 20 milliGRAM(s) Oral at bedtime    MEDICATIONS  (PRN):  acetaminophen     Tablet .. 650 milliGRAM(s) Oral every 6 hours PRN Temp greater or equal to 38C (100.4F), Mild Pain (1 - 3)  diphenhydrAMINE 50 milliGRAM(s) Oral every 6 hours PRN EPS  diphenhydrAMINE Injectable 50 milliGRAM(s) IntraMuscular once PRN EPS from PRN for severe agitation  hydrOXYzine hydrochloride 50 milliGRAM(s) Oral every 6 hours PRN anxiety  ibuprofen  Tablet. 400 milliGRAM(s) Oral every 8 hours PRN Mild Pain (1 - 3), Moderate Pain (4 - 6)  nicotine  Polacrilex Gum 2 milliGRAM(s) Oral every 2 hours PRN Smoking Cessation  OLANZapine 5 milliGRAM(s) Oral every 6 hours PRN agitation  OLANZapine Injectable 5 milliGRAM(s) IntraMuscular once PRN severe agitation  sodium chloride 0.65% Nasal 1 Spray(s) Both Nostrils every 6 hours PRN Nasal Congestion
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
## 2024-07-31 NOTE — BH INPATIENT PSYCHIATRY PROGRESS NOTE - NSBHMETABOLIC_PSY_ALL_CORE_FT
BMI: BMI (kg/m2): 22.3 (07-12-24 @ 16:04)  HbA1c: A1C with Estimated Average Glucose Result: 5.0 % (07-03-24 @ 06:45)    Glucose:   BP: --Vital Signs Last 24 Hrs  T(C): 36.4 (07-31-24 @ 08:46), Max: 36.4 (07-31-24 @ 08:46)  T(F): 97.5 (07-31-24 @ 08:46), Max: 97.5 (07-31-24 @ 08:46)  HR: --  BP: --  BP(mean): --  RR: 19 (07-31-24 @ 08:46) (19 - 19)  SpO2: --    Orthostatic VS  07-31-24 @ 08:46  Lying BP: --/-- HR: --  Sitting BP: 140/60 HR: 74  Standing BP: 147/79 HR: 79  Site: --  Mode: --  Orthostatic VS  07-30-24 @ 08:28  Lying BP: --/-- HR: --  Sitting BP: 124/70 HR: 76  Standing BP: 127/78 HR: 81  Site: --  Mode: --  Orthostatic VS  07-29-24 @ 22:49  Lying BP: --/-- HR: --  Sitting BP: 138/60 HR: 84  Standing BP: 139/73 HR: 85  Site: --  Mode: --    Lipid Panel: Date/Time: 07-03-24 @ 06:45  Cholesterol, Serum: 113  LDL Cholesterol Calculated: 54  HDL Cholesterol, Serum: 51  Total Cholesterol/HDL Ration Measurement: --  Triglycerides, Serum: 40

## 2024-07-31 NOTE — BH INPATIENT PSYCHIATRY PROGRESS NOTE - OTHER
improving   oddly related  improving  "good" concrete, disorganized - improving  easier to interrupt, less rapid

## 2024-08-01 RX ADMIN — Medication 20 MILLIGRAM(S): at 20:55

## 2024-08-01 RX ADMIN — Medication 1 TABLET(S): at 09:08

## 2024-08-01 RX ADMIN — Medication 1 MILLIGRAM(S): at 09:08

## 2024-08-01 RX ADMIN — Medication 325 MILLIGRAM(S): at 09:08

## 2024-08-01 RX ADMIN — Medication 81 MILLIGRAM(S): at 09:07

## 2024-08-01 RX ADMIN — Medication 3 MILLIGRAM(S): at 20:56

## 2024-08-01 RX ADMIN — Medication 1 PATCH: at 09:08

## 2024-08-01 RX ADMIN — DIVALPROEX SODIUM 1000 MILLIGRAM(S): 125 CAPSULE, COATED PELLETS ORAL at 20:55

## 2024-08-01 RX ADMIN — CLONAZEPAM 0.5 MILLIGRAM(S): 0.5 TABLET ORAL at 20:55

## 2024-08-01 RX ADMIN — Medication 1 DROP(S): at 09:09

## 2024-08-01 RX ADMIN — CLONAZEPAM 0.5 MILLIGRAM(S): 0.5 TABLET ORAL at 09:09

## 2024-08-01 RX ADMIN — Medication 2 MILLIGRAM(S): at 16:02

## 2024-08-01 RX ADMIN — Medication 10 MILLIGRAM(S): at 09:09

## 2024-08-01 NOTE — BH TREATMENT PLAN - NSTXPSYCHOGOAL_PSY_ALL_CORE
Will be able to report experiencing hallucinations to staff
Will report using relaxation skills 3 times a day to reduce anxiety about delusions/hallucinations
Will engage in a 15 minute conversation with no irrational content

## 2024-08-01 NOTE — BH INPATIENT PSYCHIATRY PROGRESS NOTE - CURRENT MEDICATION
MEDICATIONS  (STANDING):  artificial tears (preservative free) Ophthalmic Solution 1 Drop(s) Both EYES daily  aspirin enteric coated 81 milliGRAM(s) Oral daily  clonazePAM  Tablet 0.5 milliGRAM(s) Oral two times a day  divalproex ER 1000 milliGRAM(s) Oral at bedtime  ferrous    sulfate 325 milliGRAM(s) Oral daily  folic acid 1 milliGRAM(s) Oral daily  melatonin. 3 milliGRAM(s) Oral at bedtime  methimazole 5 milliGRAM(s) Oral daily  multivitamin 1 Tablet(s) Oral daily  nicotine -   7 mG/24Hr(s) Patch 1 Patch Transdermal daily  OLANZapine 10 milliGRAM(s) Oral daily  OLANZapine 20 milliGRAM(s) Oral at bedtime    MEDICATIONS  (PRN):  acetaminophen     Tablet .. 650 milliGRAM(s) Oral every 6 hours PRN Temp greater or equal to 38C (100.4F), Mild Pain (1 - 3)  diphenhydrAMINE 50 milliGRAM(s) Oral every 6 hours PRN EPS  diphenhydrAMINE Injectable 50 milliGRAM(s) IntraMuscular once PRN EPS from PRN for severe agitation  hydrOXYzine hydrochloride 50 milliGRAM(s) Oral every 6 hours PRN anxiety  ibuprofen  Tablet. 400 milliGRAM(s) Oral every 8 hours PRN Mild Pain (1 - 3), Moderate Pain (4 - 6)  nicotine  Polacrilex Gum 2 milliGRAM(s) Oral every 2 hours PRN Smoking Cessation  OLANZapine 5 milliGRAM(s) Oral every 6 hours PRN agitation  OLANZapine Injectable 5 milliGRAM(s) IntraMuscular once PRN severe agitation  sodium chloride 0.65% Nasal 1 Spray(s) Both Nostrils every 6 hours PRN Nasal Congestion

## 2024-08-01 NOTE — BH TREATMENT PLAN - NSTXDISORGINTERMD_PSY_ALL_CORE
med management, psychoed, therapy 

## 2024-08-01 NOTE — BH TREATMENT PLAN - NSTXVIOLNTGOAL_PSY_ALL_CORE
Will decrease the number/duration/intensity of angry/aggressive outbursts
Will be able to express understanding of at least one trigger to their aggressive behavior
Will be able to walk away from an interpersonal conflict and use a self-soothing skill

## 2024-08-01 NOTE — BH TREATMENT PLAN - NSTXDISORGGOAL_PSY_ALL_CORE
Will make at least 3 goal and reality oriented statements during therapy
Will identify 2 coping skills that assist in organizing
Will verbalize decreased distress related to disturbance of thinking to 2 on a 10-point scale

## 2024-08-01 NOTE — BH TREATMENT PLAN - NSTXMEDICINTERMD_PSY_ALL_CORE
med management, psychoed, therapy, [place] 
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
## 2024-08-01 NOTE — BH INPATIENT PSYCHIATRY PROGRESS NOTE - NSBHMSESPABN_PSY_A_CORE
Increased productivity/Other
Pressured rate/Increased productivity/Other
Increased productivity/Other
Pressured rate/Increased productivity
Increased productivity/Other
Pressured rate/Increased productivity

## 2024-08-01 NOTE — BH INPATIENT PSYCHIATRY PROGRESS NOTE - NSBHMETABOLIC_PSY_ALL_CORE_FT
BMI: BMI (kg/m2): 22.3 (07-12-24 @ 16:04)  HbA1c: A1C with Estimated Average Glucose Result: 5.0 % (07-03-24 @ 06:45)    Glucose:   BP: --Vital Signs Last 24 Hrs  T(C): 36.9 (08-01-24 @ 07:13), Max: 36.9 (08-01-24 @ 07:13)  T(F): 98.5 (08-01-24 @ 07:13), Max: 98.5 (08-01-24 @ 07:13)  HR: --  BP: --  BP(mean): --  RR: 19 (08-01-24 @ 07:13) (19 - 19)  SpO2: --    Orthostatic VS  08-01-24 @ 07:13  Lying BP: --/-- HR: --  Sitting BP: 136/64 HR: 77  Standing BP: 130/76 HR: 73  Site: --  Mode: --  Orthostatic VS  07-31-24 @ 08:46  Lying BP: --/-- HR: --  Sitting BP: 140/60 HR: 74  Standing BP: 147/79 HR: 79  Site: --  Mode: --    Lipid Panel: Date/Time: 07-03-24 @ 06:45  Cholesterol, Serum: 113  LDL Cholesterol Calculated: 54  HDL Cholesterol, Serum: 51  Total Cholesterol/HDL Ration Measurement: --  Triglycerides, Serum: 40

## 2024-08-01 NOTE — BH INPATIENT PSYCHIATRY PROGRESS NOTE - OTHER
improving  improving   easier to interrupt, less rapid  "good" concrete, disorganized - improving  oddly related

## 2024-08-01 NOTE — BH TREATMENT PLAN - NSTXSUICIDGOAL_PSY_ALL_CORE
Be able to state 3 reasons for living
Will identify and utilize 2 coping skills
Be able to state 3 reasons for living

## 2024-08-01 NOTE — BH TREATMENT PLAN - NSTXDISORGINTERPR_PSY_ALL_CORE
Psychiatric Rehabilitation staff will continue to provide encouragement, support, psychotherapy, and psychoeducation to assist the Pt in the progression of her treatment goal and engagement with activities.
During time of engagement, patient presented with euthymic mood. Patient was dressed in hospital gown and fairly groomed. Upon review, patient has made good progress towards specificied goal as evidenced by pts ability to identify reading as positive coping skill. Psych rehab staff will continue to support patient via 1:1 engagement and encourage programming attendance in effort to faviliate continued progress towards goal.    Patient has attended most psychiatric rehabilitation groups during the last seven days.

## 2024-08-02 RX ADMIN — Medication 1 PATCH: at 08:04

## 2024-08-02 RX ADMIN — Medication 325 MILLIGRAM(S): at 08:05

## 2024-08-02 RX ADMIN — Medication 1 TABLET(S): at 08:04

## 2024-08-02 RX ADMIN — Medication 10 MILLIGRAM(S): at 08:04

## 2024-08-02 RX ADMIN — CLONAZEPAM 0.5 MILLIGRAM(S): 0.5 TABLET ORAL at 21:09

## 2024-08-02 RX ADMIN — Medication 3 MILLIGRAM(S): at 21:11

## 2024-08-02 RX ADMIN — DIVALPROEX SODIUM 1000 MILLIGRAM(S): 125 CAPSULE, COATED PELLETS ORAL at 21:09

## 2024-08-02 RX ADMIN — Medication 2 MILLIGRAM(S): at 08:04

## 2024-08-02 RX ADMIN — CLONAZEPAM 0.5 MILLIGRAM(S): 0.5 TABLET ORAL at 08:05

## 2024-08-02 RX ADMIN — Medication 20 MILLIGRAM(S): at 21:09

## 2024-08-02 RX ADMIN — Medication 2 MILLIGRAM(S): at 22:21

## 2024-08-02 RX ADMIN — Medication 1 MILLIGRAM(S): at 08:04

## 2024-08-02 RX ADMIN — Medication 81 MILLIGRAM(S): at 08:04
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
## 2024-08-02 NOTE — BH INPATIENT PSYCHIATRY PROGRESS NOTE - CURRENT MEDICATION
MEDICATIONS  (STANDING):  artificial tears (preservative free) Ophthalmic Solution 1 Drop(s) Both EYES daily  aspirin enteric coated 81 milliGRAM(s) Oral daily  clonazePAM  Tablet 0.5 milliGRAM(s) Oral two times a day  divalproex ER 1000 milliGRAM(s) Oral at bedtime  ferrous    sulfate 325 milliGRAM(s) Oral daily  folic acid 1 milliGRAM(s) Oral daily  melatonin. 3 milliGRAM(s) Oral at bedtime  methimazole 5 milliGRAM(s) Oral daily  multivitamin 1 Tablet(s) Oral daily  nicotine -   7 mG/24Hr(s) Patch 1 Patch Transdermal daily  OLANZapine 20 milliGRAM(s) Oral at bedtime  OLANZapine 10 milliGRAM(s) Oral daily    MEDICATIONS  (PRN):  acetaminophen     Tablet .. 650 milliGRAM(s) Oral every 6 hours PRN Temp greater or equal to 38C (100.4F), Mild Pain (1 - 3)  diphenhydrAMINE 50 milliGRAM(s) Oral every 6 hours PRN EPS  diphenhydrAMINE Injectable 50 milliGRAM(s) IntraMuscular once PRN EPS from PRN for severe agitation  hydrOXYzine hydrochloride 50 milliGRAM(s) Oral every 6 hours PRN anxiety  ibuprofen  Tablet. 400 milliGRAM(s) Oral every 8 hours PRN Mild Pain (1 - 3), Moderate Pain (4 - 6)  nicotine  Polacrilex Gum 2 milliGRAM(s) Oral every 2 hours PRN Smoking Cessation  OLANZapine 5 milliGRAM(s) Oral every 6 hours PRN agitation  OLANZapine Injectable 5 milliGRAM(s) IntraMuscular once PRN severe agitation  sodium chloride 0.65% Nasal 1 Spray(s) Both Nostrils every 6 hours PRN Nasal Congestion

## 2024-08-02 NOTE — BH INPATIENT PSYCHIATRY PROGRESS NOTE - NSBHMETABOLIC_PSY_ALL_CORE_FT
BMI: BMI (kg/m2): 22.3 (07-12-24 @ 16:04)  HbA1c: A1C with Estimated Average Glucose Result: 5.0 % (07-03-24 @ 06:45)    Glucose:   BP: --Vital Signs Last 24 Hrs  T(C): 36.6 (08-02-24 @ 07:49), Max: 36.6 (08-02-24 @ 07:49)  T(F): 97.8 (08-02-24 @ 07:49), Max: 97.8 (08-02-24 @ 07:49)  HR: --  BP: --  BP(mean): --  RR: 20 (08-02-24 @ 07:49) (20 - 20)  SpO2: --    Orthostatic VS  08-02-24 @ 07:49  Lying BP: --/-- HR: --  Sitting BP: 132/70 HR: 78  Standing BP: 121/85 HR: 80  Site: --  Mode: --  Orthostatic VS  08-01-24 @ 07:13  Lying BP: --/-- HR: --  Sitting BP: 136/64 HR: 77  Standing BP: 130/76 HR: 73  Site: --  Mode: --    Lipid Panel: Date/Time: 07-03-24 @ 06:45  Cholesterol, Serum: 113  LDL Cholesterol Calculated: 54  HDL Cholesterol, Serum: 51  Total Cholesterol/HDL Ration Measurement: --  Triglycerides, Serum: 40

## 2024-08-03 RX ADMIN — Medication 20 MILLIGRAM(S): at 20:19

## 2024-08-03 RX ADMIN — Medication 3 MILLIGRAM(S): at 20:19

## 2024-08-03 RX ADMIN — CLONAZEPAM 0.5 MILLIGRAM(S): 0.5 TABLET ORAL at 09:03

## 2024-08-03 RX ADMIN — DIVALPROEX SODIUM 1000 MILLIGRAM(S): 125 CAPSULE, COATED PELLETS ORAL at 20:19

## 2024-08-03 RX ADMIN — Medication 1 PATCH: at 09:03

## 2024-08-03 RX ADMIN — Medication 1 MILLIGRAM(S): at 09:04

## 2024-08-03 RX ADMIN — Medication 81 MILLIGRAM(S): at 09:03

## 2024-08-03 RX ADMIN — Medication 325 MILLIGRAM(S): at 09:04

## 2024-08-03 RX ADMIN — Medication 10 MILLIGRAM(S): at 09:03

## 2024-08-03 RX ADMIN — Medication 1 DROP(S): at 09:03

## 2024-08-03 RX ADMIN — Medication 1 TABLET(S): at 09:03

## 2024-08-03 RX ADMIN — CLONAZEPAM 0.5 MILLIGRAM(S): 0.5 TABLET ORAL at 20:19

## 2024-08-04 RX ADMIN — CLONAZEPAM 0.5 MILLIGRAM(S): 0.5 TABLET ORAL at 20:17

## 2024-08-04 RX ADMIN — Medication 1 PATCH: at 08:36

## 2024-08-04 RX ADMIN — Medication 20 MILLIGRAM(S): at 20:17

## 2024-08-04 RX ADMIN — Medication 1 TABLET(S): at 08:37

## 2024-08-04 RX ADMIN — Medication 10 MILLIGRAM(S): at 08:38

## 2024-08-04 RX ADMIN — Medication 1 DROP(S): at 08:36

## 2024-08-04 RX ADMIN — Medication 1 MILLIGRAM(S): at 08:38

## 2024-08-04 RX ADMIN — Medication 325 MILLIGRAM(S): at 08:38

## 2024-08-04 RX ADMIN — DIVALPROEX SODIUM 1000 MILLIGRAM(S): 125 CAPSULE, COATED PELLETS ORAL at 20:17

## 2024-08-04 RX ADMIN — Medication 2 MILLIGRAM(S): at 18:31

## 2024-08-04 RX ADMIN — Medication 3 MILLIGRAM(S): at 20:18

## 2024-08-04 RX ADMIN — Medication 2 MILLIGRAM(S): at 09:04

## 2024-08-04 RX ADMIN — CLONAZEPAM 0.5 MILLIGRAM(S): 0.5 TABLET ORAL at 08:38

## 2024-08-04 RX ADMIN — Medication 81 MILLIGRAM(S): at 08:37

## 2024-08-05 RX ORDER — CLONAZEPAM 0.5 MG/1
0.5 TABLET ORAL AT BEDTIME
Refills: 0 | Status: DISCONTINUED | OUTPATIENT
Start: 2024-08-05 | End: 2024-08-06

## 2024-08-05 RX ADMIN — Medication 20 MILLIGRAM(S): at 20:12

## 2024-08-05 RX ADMIN — Medication 1 TABLET(S): at 08:52

## 2024-08-05 RX ADMIN — CLONAZEPAM 0.5 MILLIGRAM(S): 0.5 TABLET ORAL at 08:53

## 2024-08-05 RX ADMIN — Medication 1 PATCH: at 22:41

## 2024-08-05 RX ADMIN — Medication 325 MILLIGRAM(S): at 08:53

## 2024-08-05 RX ADMIN — CLONAZEPAM 0.5 MILLIGRAM(S): 0.5 TABLET ORAL at 20:13

## 2024-08-05 RX ADMIN — Medication 10 MILLIGRAM(S): at 08:53

## 2024-08-05 RX ADMIN — Medication 3 MILLIGRAM(S): at 20:12

## 2024-08-05 RX ADMIN — Medication 1 PATCH: at 08:52

## 2024-08-05 RX ADMIN — Medication 81 MILLIGRAM(S): at 08:53

## 2024-08-05 RX ADMIN — Medication 1 DROP(S): at 08:53

## 2024-08-05 RX ADMIN — Medication 1 MILLIGRAM(S): at 08:52

## 2024-08-05 RX ADMIN — DIVALPROEX SODIUM 1000 MILLIGRAM(S): 125 CAPSULE, COATED PELLETS ORAL at 20:12

## 2024-08-05 NOTE — BH PSYCHOLOGY - GROUP THERAPY NOTE - TOKEN PULL-DIAGNOSIS
Primary Diagnosis:  Psychosis [F29]        Problem Dx:   Cannabis abuse [F12.10]      History of alcohol use disorder [Z87.898]      Hyperthyroidism [E05.90]      Heart murmur [R01.1]      Anemia [D64.9]      
Primary Diagnosis:  Psychosis [F29]        Problem Dx:   Cannabis abuse [F12.10]      History of alcohol use disorder [Z87.898]      Hyperthyroidism [E05.90]      Heart murmur [R01.1]      Anemia [D64.9]

## 2024-08-05 NOTE — BH PSYCHOLOGY - GROUP THERAPY NOTE - NSPSYCHOLGRPDBTGOAL_PSY_A_CORE
reduce mood and affective lability/reduce impulsive self-defeating behavior/reduce interpersonal conflicts/improve ability to indentify feelings/improve ability to communicate feelings/reduce vulnerability to emotional dysregualation/promote skills to reduce anger
reduce mood and affective lability/reduce impulsive self-defeating behavior/reduce interpersonal conflicts/reduce self-injurious behavior/reduce suicidal ideation/risk of attempt/improve ability to indentify feelings/improve ability to communicate feelings/reduce vulnerability to emotional dysregualation/promote skills to reduce anger

## 2024-08-05 NOTE — BH PSYCHOLOGY - GROUP THERAPY NOTE - NSBHPSYCHOLRESPCOMMENT_PSY_A_CORE FT
The patient appeared adequately groomed and dressed in a hospital gown. Pt was engaged in the group as evidenced by participating in group discussion. Pt discussed how she engages in harmful behaviors, such as substance use, as a response to crisis urges. Pt had difficulty following group rules to speak one at a time but responded to redirection. Patient was open to feedback, and she was appropriate with her peers. Pt was encouraged to practice skill of Pros and Cons for symptom reduction. 
The patient appeared adequately groomed and casually dressed. Pt was engaged in the group as evidenced by participating in group discussion. Patient discussed how she can build mastery with the activity of walking and cope ahead with overwhelming interpersonal situations. Pt volunteered to read from the worksheet when given the opportunity. Pt was oriented X3. Patient was open to feedback, and she was appropriate with her peers. Pt found the group to be helpful. Pt was encouraged to practice skill building mastery and coping ahead for symptom reduction.
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
## 2024-08-05 NOTE — BH PSYCHOLOGY - GROUP THERAPY NOTE - NSPSYCHOLGRPDBTPROB_PSY_A_CORE
anger/anxiety/dependency/emotional dysregulation/impulsive behaviors/irritability/labile affect/poor judgment
anger/anxiety/depressed mood/emotional dysregulation/impulsive behaviors/irritability/labile affect/poor judgment/self-injury/substance abuse/suicidal gestures/suicidal ideation

## 2024-08-05 NOTE — BH PSYCHOLOGY - GROUP THERAPY NOTE - NSPSYCHOLGRPDBTPT_PSY_A_CORE FT
Patient attended a Dialectal Behavior Therapy Group (DBT) group focused on Distress Tolerance. Group began with a brief check in asking pts to share review of skills and/or present emotions. Pt participated in a mindfulness exercise and were encouraged to share thought/reactions. The group focused on the Distress Tolerance module on the skill of Pros and Cons. The group was structured to develop skills for how to decide between two courses of action. Patients discussed situations of the pros and cons of acting on crisis urges and pros and cons on resisting crisis urge. Group facilitator explained concepts, reinforced participation, and engaged patients in discussion.
Patient attended a Dialectal Behavior Therapy Group (DBT) group focused on Emotion Regulation. Group began with a brief check in asking pts to share review of skills and/or present emotions. Pt participated in a mindfulness exercise and were encouraged to share thought/reactions. The group focused on the Emotion Regulation module on the skill of Building Mastery and [place] Ahead of time with difficult situations. The group was structured at developing skills of mastery and strategies to manage emotions effectively in challenging future situations. Group facilitator explained concepts, reinforced participation, and engaged patients in discussion.

## 2024-08-05 NOTE — BH INPATIENT PSYCHIATRY PROGRESS NOTE - NSBHMETABOLIC_PSY_ALL_CORE_FT
BMI: BMI (kg/m2): 22.3 (07-12-24 @ 16:04)  HbA1c: A1C with Estimated Average Glucose Result: 5.0 % (07-03-24 @ 06:45)    Glucose:   BP: --Vital Signs Last 24 Hrs  T(C): 36.4 (08-05-24 @ 07:30), Max: 36.4 (08-05-24 @ 07:30)  T(F): 97.6 (08-05-24 @ 07:30), Max: 97.6 (08-05-24 @ 07:30)  HR: --  BP: --  BP(mean): --  RR: --  SpO2: --    Orthostatic VS  08-05-24 @ 07:30  Lying BP: --/-- HR: --  Sitting BP: 137/63 HR: 75  Standing BP: 142/78 HR: 79  Site: upper left arm  Mode: electronic  Orthostatic VS  08-04-24 @ 07:50  Lying BP: --/-- HR: --  Sitting BP: 143/79 HR: 93  Standing BP: 134/79 HR: 92  Site: --  Mode: --    Lipid Panel: Date/Time: 07-03-24 @ 06:45  Cholesterol, Serum: 113  LDL Cholesterol Calculated: 54  HDL Cholesterol, Serum: 51  Total Cholesterol/HDL Ration Measurement: --  Triglycerides, Serum: 40

## 2024-08-05 NOTE — BH INPATIENT PSYCHIATRY PROGRESS NOTE - CURRENT MEDICATION
MEDICATIONS  (STANDING):  artificial tears (preservative free) Ophthalmic Solution 1 Drop(s) Both EYES daily  aspirin enteric coated 81 milliGRAM(s) Oral daily  clonazePAM  Tablet 0.5 milliGRAM(s) Oral at bedtime  divalproex ER 1000 milliGRAM(s) Oral at bedtime  ferrous    sulfate 325 milliGRAM(s) Oral daily  folic acid 1 milliGRAM(s) Oral daily  melatonin. 3 milliGRAM(s) Oral at bedtime  methimazole 5 milliGRAM(s) Oral daily  multivitamin 1 Tablet(s) Oral daily  nicotine -   7 mG/24Hr(s) Patch 1 Patch Transdermal daily  OLANZapine 10 milliGRAM(s) Oral daily  OLANZapine 20 milliGRAM(s) Oral at bedtime    MEDICATIONS  (PRN):  acetaminophen     Tablet .. 650 milliGRAM(s) Oral every 6 hours PRN Temp greater or equal to 38C (100.4F), Mild Pain (1 - 3)  diphenhydrAMINE 50 milliGRAM(s) Oral every 6 hours PRN EPS  diphenhydrAMINE Injectable 50 milliGRAM(s) IntraMuscular once PRN EPS from PRN for severe agitation  hydrOXYzine hydrochloride 50 milliGRAM(s) Oral every 6 hours PRN anxiety  ibuprofen  Tablet. 400 milliGRAM(s) Oral every 8 hours PRN Mild Pain (1 - 3), Moderate Pain (4 - 6)  nicotine  Polacrilex Gum 2 milliGRAM(s) Oral every 2 hours PRN Smoking Cessation  OLANZapine 5 milliGRAM(s) Oral every 6 hours PRN agitation  OLANZapine Injectable 5 milliGRAM(s) IntraMuscular once PRN severe agitation  sodium chloride 0.65% Nasal 1 Spray(s) Both Nostrils every 6 hours PRN Nasal Congestion

## 2024-08-05 NOTE — BH PSYCHOLOGY - GROUP THERAPY NOTE - NSPSYCHOLGRPDBTINT_PSY_A_CORE
reveiwed mindfullness homework/reviewed distress tolerance, skills homework
reveiwed mindfullness homework/review emotion regulation homework

## 2024-08-06 RX ORDER — CLONAZEPAM 0.5 MG/1
0.5 TABLET ORAL AT BEDTIME
Refills: 0 | Status: DISCONTINUED | OUTPATIENT
Start: 2024-08-06 | End: 2024-08-08

## 2024-08-06 RX ADMIN — Medication 1 PATCH: at 09:04

## 2024-08-06 RX ADMIN — Medication 2 MILLIGRAM(S): at 22:04

## 2024-08-06 RX ADMIN — Medication 3 MILLIGRAM(S): at 20:30

## 2024-08-06 RX ADMIN — Medication 325 MILLIGRAM(S): at 09:04

## 2024-08-06 RX ADMIN — Medication 81 MILLIGRAM(S): at 09:05

## 2024-08-06 RX ADMIN — Medication 1 MILLIGRAM(S): at 09:04

## 2024-08-06 RX ADMIN — Medication 1 DROP(S): at 09:05

## 2024-08-06 RX ADMIN — CLONAZEPAM 0.5 MILLIGRAM(S): 0.5 TABLET ORAL at 20:30

## 2024-08-06 RX ADMIN — Medication 20 MILLIGRAM(S): at 20:30

## 2024-08-06 RX ADMIN — Medication 1 TABLET(S): at 09:05

## 2024-08-06 RX ADMIN — Medication 10 MILLIGRAM(S): at 09:04

## 2024-08-06 RX ADMIN — DIVALPROEX SODIUM 1000 MILLIGRAM(S): 125 CAPSULE, COATED PELLETS ORAL at 20:31

## 2024-08-06 NOTE — BH INPATIENT PSYCHIATRY PROGRESS NOTE - NSBHMETABOLIC_PSY_ALL_CORE_FT
BMI: BMI (kg/m2): 22.3 (07-12-24 @ 16:04)  HbA1c: A1C with Estimated Average Glucose Result: 5.0 % (07-03-24 @ 06:45)    Glucose:   BP: --Vital Signs Last 24 Hrs  T(C): 36.3 (08-06-24 @ 07:39), Max: 36.3 (08-06-24 @ 07:39)  T(F): 97.4 (08-06-24 @ 07:39), Max: 97.4 (08-06-24 @ 07:39)  HR: --  BP: --  BP(mean): --  RR: 19 (08-06-24 @ 07:39) (19 - 19)  SpO2: --    Orthostatic VS  08-06-24 @ 07:39  Lying BP: --/-- HR: --  Sitting BP: 141/65 HR: 78  Standing BP: 148/72 HR: 84  Site: --  Mode: --  Orthostatic VS  08-05-24 @ 07:30  Lying BP: --/-- HR: --  Sitting BP: 137/63 HR: 75  Standing BP: 142/78 HR: 79  Site: upper left arm  Mode: electronic    Lipid Panel: Date/Time: 07-03-24 @ 06:45  Cholesterol, Serum: 113  LDL Cholesterol Calculated: 54  HDL Cholesterol, Serum: 51  Total Cholesterol/HDL Ration Measurement: --  Triglycerides, Serum: 40

## 2024-08-06 NOTE — BH INPATIENT PSYCHIATRY PROGRESS NOTE - NSBHMSEHYG_PSY_A_CORE
68 Ventricular Rate BPM  68 Atrial Rate BPM  175 P-R Interval ms  90 QRS Duration ms  384 Q-T Interval ms  409 QTC Calculation(Bazett) ms  32 P Axis degrees  28 R Axis degrees  60 T Axis degrees  Sinus rhythm  ST elev, probable normal early repol pattern  Confirmed by XENA PERKINS DAVID ([id number]) on 9/22/2019 10:18:28 AM   [place]

## 2024-08-06 NOTE — BH INPATIENT PSYCHIATRY PROGRESS NOTE - CURRENT MEDICATION
MEDICATIONS  (STANDING):  artificial tears (preservative free) Ophthalmic Solution 1 Drop(s) Both EYES daily  aspirin enteric coated 81 milliGRAM(s) Oral daily  clonazePAM  Tablet 0.5 milliGRAM(s) Oral at bedtime  divalproex ER 1000 milliGRAM(s) Oral at bedtime  ferrous    sulfate 325 milliGRAM(s) Oral daily  folic acid 1 milliGRAM(s) Oral daily  melatonin. 3 milliGRAM(s) Oral at bedtime  methimazole 5 milliGRAM(s) Oral daily  multivitamin 1 Tablet(s) Oral daily  nicotine -   7 mG/24Hr(s) Patch 1 Patch Transdermal daily  OLANZapine 20 milliGRAM(s) Oral at bedtime  OLANZapine 10 milliGRAM(s) Oral daily    MEDICATIONS  (PRN):  acetaminophen     Tablet .. 650 milliGRAM(s) Oral every 6 hours PRN Temp greater or equal to 38C (100.4F), Mild Pain (1 - 3)  diphenhydrAMINE 50 milliGRAM(s) Oral every 6 hours PRN EPS  diphenhydrAMINE Injectable 50 milliGRAM(s) IntraMuscular once PRN EPS from PRN for severe agitation  hydrOXYzine hydrochloride 50 milliGRAM(s) Oral every 6 hours PRN anxiety  ibuprofen  Tablet. 400 milliGRAM(s) Oral every 8 hours PRN Mild Pain (1 - 3), Moderate Pain (4 - 6)  nicotine  Polacrilex Gum 2 milliGRAM(s) Oral every 2 hours PRN Smoking Cessation  OLANZapine 5 milliGRAM(s) Oral every 6 hours PRN agitation  OLANZapine Injectable 5 milliGRAM(s) IntraMuscular once PRN severe agitation  sodium chloride 0.65% Nasal 1 Spray(s) Both Nostrils every 6 hours PRN Nasal Congestion
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
## 2024-08-07 RX ORDER — ASPIRIN 500 MG
1 TABLET ORAL
Qty: 14 | Refills: 0
Start: 2024-08-07 | End: 2024-08-20

## 2024-08-07 RX ORDER — NICOTINE 21 MG/24HR
1 PATCH, TRANSDERMAL 24 HOURS TRANSDERMAL
Qty: 14 | Refills: 0
Start: 2024-08-07 | End: 2024-08-20

## 2024-08-07 RX ORDER — FERROUS SULFATE 325(65) MG
1 TABLET ORAL
Qty: 14 | Refills: 0
Start: 2024-08-07 | End: 2024-08-20

## 2024-08-07 RX ORDER — CYANOCOBALAMIN/FOLIC AC/VIT B6 2-2.5-25MG
1 TABLET ORAL
Qty: 14 | Refills: 0
Start: 2024-08-07 | End: 2024-08-20

## 2024-08-07 RX ORDER — CLONAZEPAM 0.5 MG/1
1 TABLET ORAL
Qty: 7 | Refills: 0
Start: 2024-08-07 | End: 2024-08-13

## 2024-08-07 RX ORDER — DIVALPROEX SODIUM 125 MG/1
2 CAPSULE, COATED PELLETS ORAL
Qty: 28 | Refills: 0
Start: 2024-08-07 | End: 2024-08-20

## 2024-08-07 RX ORDER — METHIMAZOLE 5 MG
1 TABLET ORAL
Qty: 14 | Refills: 0
Start: 2024-08-07 | End: 2024-08-20

## 2024-08-07 RX ORDER — MELATONIN 3 MG
1 TABLET ORAL
Qty: 14 | Refills: 0
Start: 2024-08-07 | End: 2024-08-20

## 2024-08-07 RX ORDER — MULTIVITAMIN/IRON/FOLIC ACID 18MG-0.4MG
1 TABLET ORAL
Qty: 14 | Refills: 0
Start: 2024-08-07 | End: 2024-08-20

## 2024-08-07 RX ADMIN — Medication 1 PATCH: at 09:16

## 2024-08-07 RX ADMIN — Medication 81 MILLIGRAM(S): at 09:18

## 2024-08-07 RX ADMIN — Medication 1 PATCH: at 07:41

## 2024-08-07 RX ADMIN — CLONAZEPAM 0.5 MILLIGRAM(S): 0.5 TABLET ORAL at 20:29

## 2024-08-07 RX ADMIN — Medication 1 PATCH: at 09:00

## 2024-08-07 RX ADMIN — Medication 1 TABLET(S): at 09:17

## 2024-08-07 RX ADMIN — Medication 325 MILLIGRAM(S): at 09:17

## 2024-08-07 RX ADMIN — Medication 1 PATCH: at 19:24

## 2024-08-07 RX ADMIN — Medication 1 DROP(S): at 09:18

## 2024-08-07 RX ADMIN — DIVALPROEX SODIUM 1000 MILLIGRAM(S): 125 CAPSULE, COATED PELLETS ORAL at 20:51

## 2024-08-07 RX ADMIN — Medication 20 MILLIGRAM(S): at 20:51

## 2024-08-07 RX ADMIN — Medication 10 MILLIGRAM(S): at 09:17

## 2024-08-07 RX ADMIN — Medication 1 MILLIGRAM(S): at 09:17

## 2024-08-07 RX ADMIN — Medication 3 MILLIGRAM(S): at 20:51

## 2024-08-07 NOTE — BH INPATIENT PSYCHIATRY PROGRESS NOTE - NSBHFUPINTERVALCCFT_PSY_A_CORE
Patient seen for follow up for psychosis.
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
## 2024-08-07 NOTE — BH INPATIENT PSYCHIATRY PROGRESS NOTE - NSTXDISORGDATEEST_PSY_ALL_CORE
17-Jul-2024
19-Jul-2024
13-Jul-2024
17-Jul-2024
13-Jul-2024
13-Jul-2024
19-Jul-2024
13-Jul-2024
17-Jul-2024
19-Jul-2024
17-Jul-2024
19-Jul-2024
17-Jul-2024
19-Jul-2024
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
## 2024-08-07 NOTE — BH INPATIENT PSYCHIATRY PROGRESS NOTE - NSTXPSYCHODATETRGT_PSY_ALL_CORE
31-Jul-2024
24-Jul-2024
24-Jul-2024
31-Jul-2024
18-Jul-2024
18-Jul-2024
31-Jul-2024
31-Jul-2024
18-Jul-2024
24-Jul-2024
31-Jul-2024
24-Jul-2024
18-Jul-2024
24-Jul-2024
31-Jul-2024
24-Jul-2024

## 2024-08-07 NOTE — BH INPATIENT PSYCHIATRY PROGRESS NOTE - NSBHMSETHTASSOC_PSY_A_CORE
Loose
Normal
Loose
Normal
Loose
Loose
Normal
Loose
Normal
Loose
Normal
Loose

## 2024-08-07 NOTE — BH INPATIENT PSYCHIATRY PROGRESS NOTE - NSTXDCFAMINTERMD_PSY_ALL_CORE
Psycho education.

## 2024-08-07 NOTE — BH INPATIENT PSYCHIATRY PROGRESS NOTE - NSTXSUICIDGOAL_PSY_ALL_CORE
Be able to state 3 reasons for living
Be able to state 3 reasons for living
Will identify and utilize 2 coping skills
Be able to state 3 reasons for living
Will identify and utilize 2 coping skills
Be able to state 3 reasons for living
Be able to state 3 reasons for living
Will identify and utilize 2 coping skills
Be able to state 3 reasons for living
Will identify and utilize 2 coping skills
Be able to state 3 reasons for living
Will identify and utilize 2 coping skills

## 2024-08-07 NOTE — BH DISCHARGE NOTE NURSING/SOCIAL WORK/PSYCH REHAB - NSDCPRRECOMMEND_PSY_ALL_CORE
Upon discharge, it is recommended that patient continue to attend a structured and supportive intervention to sustain noted improvements.

## 2024-08-07 NOTE — BH INPATIENT PSYCHIATRY PROGRESS NOTE - NSCGISEVERILLNESS_PSY_ALL_CORE
5 = Markedly ill - intrusive symptoms that distinctly impair social/occupational function or cause intrusive levels of distress
4 = Moderately ill – overt symptoms causing noticeable, but modest, functional impairment or distress; symptom level may warrant medication
5 = Markedly ill - intrusive symptoms that distinctly impair social/occupational function or cause intrusive levels of distress
5 = Markedly ill - intrusive symptoms that distinctly impair social/occupational function or cause intrusive levels of distress
4 = Moderately ill – overt symptoms causing noticeable, but modest, functional impairment or distress; symptom level may warrant medication
5 = Markedly ill - intrusive symptoms that distinctly impair social/occupational function or cause intrusive levels of distress
4 = Moderately ill – overt symptoms causing noticeable, but modest, functional impairment or distress; symptom level may warrant medication
5 = Markedly ill - intrusive symptoms that distinctly impair social/occupational function or cause intrusive levels of distress
4 = Moderately ill – overt symptoms causing noticeable, but modest, functional impairment or distress; symptom level may warrant medication
5 = Markedly ill - intrusive symptoms that distinctly impair social/occupational function or cause intrusive levels of distress
4 = Moderately ill – overt symptoms causing noticeable, but modest, functional impairment or distress; symptom level may warrant medication
4 = Moderately ill – overt symptoms causing noticeable, but modest, functional impairment or distress; symptom level may warrant medication

## 2024-08-07 NOTE — BH INPATIENT PSYCHIATRY PROGRESS NOTE - NSTXDCFAMDATEEST_PSY_ALL_CORE
23-Jul-2024
16-Jul-2024
30-Jul-2024
30-Jul-2024
16-Jul-2024
30-Jul-2024
23-Jul-2024
30-Jul-2024
23-Jul-2024

## 2024-08-07 NOTE — BH DISCHARGE NOTE NURSING/SOCIAL WORK/PSYCH REHAB - NSDCADDINFO1FT_PSY_ALL_CORE
This appointment will be in person, and check-in will begin promptly at 11:00 a.m. The appointment could last 90 minutes or longer.

## 2024-08-07 NOTE — BH INPATIENT PSYCHIATRY PROGRESS NOTE - NSBHMSEAFFRANGE_PSY_A_CORE
Constricted
Constricted
Full
Labile
Constricted
Full
Labile
Labile/Constricted/Other
Full
Full
Constricted
Full
Labile
Full
Labile
Constricted
Labile
Full

## 2024-08-07 NOTE — BH DISCHARGE NOTE NURSING/SOCIAL WORK/PSYCH REHAB - NSDCPEWEB_GEN_ALL_CORE
[facility] for Tobacco Control website --- [email or web address] website --- [email or web address]

## 2024-08-07 NOTE — BH INPATIENT PSYCHIATRY PROGRESS NOTE - PRN MEDS
MEDICATIONS  (PRN):  acetaminophen     Tablet .. 650 milliGRAM(s) Oral every 6 hours PRN Temp greater or equal to 38C (100.4F), Mild Pain (1 - 3)  diphenhydrAMINE 50 milliGRAM(s) Oral every 6 hours PRN EPS  diphenhydrAMINE Injectable 50 milliGRAM(s) IntraMuscular once PRN EPS from PRN for severe agitation  hydrOXYzine hydrochloride 50 milliGRAM(s) Oral every 6 hours PRN anxiety  nicotine  Polacrilex Gum 2 milliGRAM(s) Oral every 2 hours PRN Smoking Cessation  OLANZapine 5 milliGRAM(s) Oral every 6 hours PRN agitation  OLANZapine Injectable 5 milliGRAM(s) IntraMuscular once PRN severe agitation  sodium chloride 0.65% Nasal 1 Spray(s) Both Nostrils every 6 hours PRN Nasal Congestion  
MEDICATIONS  (PRN):  acetaminophen     Tablet .. 650 milliGRAM(s) Oral every 6 hours PRN Temp greater or equal to 38C (100.4F), Mild Pain (1 - 3)  diphenhydrAMINE 50 milliGRAM(s) Oral every 6 hours PRN EPS  diphenhydrAMINE Injectable 50 milliGRAM(s) IntraMuscular once PRN EPS from PRN for severe agitation  hydrOXYzine hydrochloride 50 milliGRAM(s) Oral every 6 hours PRN anxiety  ibuprofen  Tablet. 400 milliGRAM(s) Oral every 8 hours PRN Mild Pain (1 - 3), Moderate Pain (4 - 6)  nicotine  Polacrilex Gum 2 milliGRAM(s) Oral every 2 hours PRN Smoking Cessation  OLANZapine 5 milliGRAM(s) Oral every 6 hours PRN agitation  OLANZapine Injectable 5 milliGRAM(s) IntraMuscular once PRN severe agitation  sodium chloride 0.65% Nasal 1 Spray(s) Both Nostrils every 6 hours PRN Nasal Congestion  
MEDICATIONS  (PRN):  acetaminophen     Tablet .. 650 milliGRAM(s) Oral every 6 hours PRN Temp greater or equal to 38C (100.4F), Mild Pain (1 - 3)  diphenhydrAMINE 50 milliGRAM(s) Oral every 6 hours PRN EPS  diphenhydrAMINE Injectable 50 milliGRAM(s) IntraMuscular once PRN EPS from PRN for severe agitation  hydrOXYzine hydrochloride 50 milliGRAM(s) Oral every 6 hours PRN anxiety  nicotine  Polacrilex Gum 2 milliGRAM(s) Oral every 2 hours PRN Smoking Cessation  OLANZapine 5 milliGRAM(s) Oral every 6 hours PRN agitation  OLANZapine Injectable 5 milliGRAM(s) IntraMuscular once PRN severe agitation  
MEDICATIONS  (PRN):  acetaminophen     Tablet .. 650 milliGRAM(s) Oral every 6 hours PRN Temp greater or equal to 38C (100.4F), Mild Pain (1 - 3)  diphenhydrAMINE 50 milliGRAM(s) Oral every 6 hours PRN EPS  diphenhydrAMINE Injectable 50 milliGRAM(s) IntraMuscular once PRN EPS from PRN for severe agitation  hydrOXYzine hydrochloride 50 milliGRAM(s) Oral every 6 hours PRN anxiety  nicotine  Polacrilex Gum 2 milliGRAM(s) Oral every 2 hours PRN Smoking Cessation  OLANZapine 5 milliGRAM(s) Oral every 6 hours PRN agitation  OLANZapine Injectable 5 milliGRAM(s) IntraMuscular once PRN severe agitation  sodium chloride 0.65% Nasal 1 Spray(s) Both Nostrils every 6 hours PRN Nasal Congestion  
MEDICATIONS  (PRN):  acetaminophen     Tablet .. 650 milliGRAM(s) Oral every 6 hours PRN Temp greater or equal to 38C (100.4F), Mild Pain (1 - 3)  diphenhydrAMINE 50 milliGRAM(s) Oral every 6 hours PRN EPS  diphenhydrAMINE Injectable 50 milliGRAM(s) IntraMuscular once PRN EPS from PRN for severe agitation  hydrOXYzine hydrochloride 50 milliGRAM(s) Oral every 6 hours PRN anxiety  nicotine  Polacrilex Gum 2 milliGRAM(s) Oral every 2 hours PRN Smoking Cessation  OLANZapine 5 milliGRAM(s) Oral every 6 hours PRN agitation  OLANZapine Injectable 5 milliGRAM(s) IntraMuscular once PRN severe agitation  
MEDICATIONS  (PRN):  acetaminophen     Tablet .. 650 milliGRAM(s) Oral every 6 hours PRN Temp greater or equal to 38C (100.4F), Mild Pain (1 - 3)  diphenhydrAMINE 50 milliGRAM(s) Oral every 6 hours PRN EPS  diphenhydrAMINE Injectable 50 milliGRAM(s) IntraMuscular once PRN EPS from PRN for severe agitation  hydrOXYzine hydrochloride 50 milliGRAM(s) Oral every 6 hours PRN anxiety  ibuprofen  Tablet. 400 milliGRAM(s) Oral every 8 hours PRN Mild Pain (1 - 3), Moderate Pain (4 - 6)  nicotine  Polacrilex Gum 2 milliGRAM(s) Oral every 2 hours PRN Smoking Cessation  OLANZapine 5 milliGRAM(s) Oral every 6 hours PRN agitation  OLANZapine Injectable 5 milliGRAM(s) IntraMuscular once PRN severe agitation  sodium chloride 0.65% Nasal 1 Spray(s) Both Nostrils every 6 hours PRN Nasal Congestion  
MEDICATIONS  (PRN):  acetaminophen     Tablet .. 650 milliGRAM(s) Oral every 6 hours PRN Temp greater or equal to 38C (100.4F), Mild Pain (1 - 3)  diphenhydrAMINE 50 milliGRAM(s) Oral every 6 hours PRN EPS  diphenhydrAMINE Injectable 50 milliGRAM(s) IntraMuscular once PRN EPS from PRN for severe agitation  hydrOXYzine hydrochloride 50 milliGRAM(s) Oral every 6 hours PRN anxiety  ibuprofen  Tablet. 400 milliGRAM(s) Oral every 8 hours PRN Mild Pain (1 - 3), Moderate Pain (4 - 6)  nicotine  Polacrilex Gum 2 milliGRAM(s) Oral every 2 hours PRN Smoking Cessation  OLANZapine 5 milliGRAM(s) Oral every 6 hours PRN agitation  OLANZapine Injectable 5 milliGRAM(s) IntraMuscular once PRN severe agitation  sodium chloride 0.65% Nasal 1 Spray(s) Both Nostrils every 6 hours PRN Nasal Congestion  
MEDICATIONS  (PRN):  acetaminophen     Tablet .. 650 milliGRAM(s) Oral every 6 hours PRN Temp greater or equal to 38C (100.4F), Mild Pain (1 - 3)  diphenhydrAMINE 50 milliGRAM(s) Oral every 6 hours PRN EPS  diphenhydrAMINE Injectable 50 milliGRAM(s) IntraMuscular once PRN EPS from PRN for severe agitation  hydrOXYzine hydrochloride 50 milliGRAM(s) Oral every 6 hours PRN anxiety  nicotine  Polacrilex Gum 2 milliGRAM(s) Oral every 2 hours PRN Smoking Cessation  OLANZapine 5 milliGRAM(s) Oral every 6 hours PRN agitation  OLANZapine Injectable 5 milliGRAM(s) IntraMuscular once PRN severe agitation  
MEDICATIONS  (PRN):  acetaminophen     Tablet .. 650 milliGRAM(s) Oral every 6 hours PRN Temp greater or equal to 38C (100.4F), Mild Pain (1 - 3)  diphenhydrAMINE 50 milliGRAM(s) Oral every 6 hours PRN EPS  diphenhydrAMINE Injectable 50 milliGRAM(s) IntraMuscular once PRN EPS from PRN for severe agitation  hydrOXYzine hydrochloride 50 milliGRAM(s) Oral every 6 hours PRN anxiety  nicotine  Polacrilex Gum 2 milliGRAM(s) Oral every 2 hours PRN Smoking Cessation  OLANZapine 5 milliGRAM(s) Oral every 6 hours PRN agitation  OLANZapine Injectable 5 milliGRAM(s) IntraMuscular once PRN severe agitation  
MEDICATIONS  (PRN):  acetaminophen     Tablet .. 650 milliGRAM(s) Oral every 6 hours PRN Temp greater or equal to 38C (100.4F), Mild Pain (1 - 3)  diphenhydrAMINE 50 milliGRAM(s) Oral every 6 hours PRN EPS  diphenhydrAMINE Injectable 50 milliGRAM(s) IntraMuscular once PRN EPS from PRN for severe agitation  hydrOXYzine hydrochloride 50 milliGRAM(s) Oral every 6 hours PRN anxiety  nicotine  Polacrilex Gum 2 milliGRAM(s) Oral every 2 hours PRN Smoking Cessation  OLANZapine 5 milliGRAM(s) Oral every 6 hours PRN agitation  OLANZapine Injectable 5 milliGRAM(s) IntraMuscular once PRN severe agitation  sodium chloride 0.65% Nasal 1 Spray(s) Both Nostrils every 6 hours PRN Nasal Congestion  
MEDICATIONS  (PRN):  acetaminophen     Tablet .. 650 milliGRAM(s) Oral every 6 hours PRN Temp greater or equal to 38C (100.4F), Mild Pain (1 - 3)  diphenhydrAMINE 50 milliGRAM(s) Oral every 6 hours PRN EPS  diphenhydrAMINE Injectable 50 milliGRAM(s) IntraMuscular once PRN EPS from PRN for severe agitation  hydrOXYzine hydrochloride 50 milliGRAM(s) Oral every 6 hours PRN anxiety  nicotine  Polacrilex Gum 2 milliGRAM(s) Oral every 2 hours PRN Smoking Cessation  OLANZapine 5 milliGRAM(s) Oral every 6 hours PRN agitation  OLANZapine Injectable 5 milliGRAM(s) IntraMuscular once PRN severe agitation  sodium chloride 0.65% Nasal 1 Spray(s) Both Nostrils every 6 hours PRN Nasal Congestion  
MEDICATIONS  (PRN):  acetaminophen     Tablet .. 650 milliGRAM(s) Oral every 6 hours PRN Temp greater or equal to 38C (100.4F), Mild Pain (1 - 3)  diphenhydrAMINE 50 milliGRAM(s) Oral every 6 hours PRN EPS  diphenhydrAMINE Injectable 50 milliGRAM(s) IntraMuscular once PRN EPS from PRN for severe agitation  hydrOXYzine hydrochloride 50 milliGRAM(s) Oral every 6 hours PRN anxiety  ibuprofen  Tablet. 400 milliGRAM(s) Oral every 8 hours PRN Mild Pain (1 - 3), Moderate Pain (4 - 6)  nicotine  Polacrilex Gum 2 milliGRAM(s) Oral every 2 hours PRN Smoking Cessation  OLANZapine 5 milliGRAM(s) Oral every 6 hours PRN agitation  OLANZapine Injectable 5 milliGRAM(s) IntraMuscular once PRN severe agitation  sodium chloride 0.65% Nasal 1 Spray(s) Both Nostrils every 6 hours PRN Nasal Congestion  
MEDICATIONS  (PRN):  acetaminophen     Tablet .. 650 milliGRAM(s) Oral every 6 hours PRN Temp greater or equal to 38C (100.4F), Mild Pain (1 - 3)  diphenhydrAMINE 50 milliGRAM(s) Oral every 6 hours PRN EPS  diphenhydrAMINE Injectable 50 milliGRAM(s) IntraMuscular once PRN EPS from PRN for severe agitation  hydrOXYzine hydrochloride 50 milliGRAM(s) Oral every 6 hours PRN anxiety  nicotine  Polacrilex Gum 2 milliGRAM(s) Oral every 2 hours PRN Smoking Cessation  OLANZapine 5 milliGRAM(s) Oral every 6 hours PRN agitation  OLANZapine Injectable 5 milliGRAM(s) IntraMuscular once PRN severe agitation  sodium chloride 0.65% Nasal 1 Spray(s) Both Nostrils every 6 hours PRN Nasal Congestion  
MEDICATIONS  (PRN):  acetaminophen     Tablet .. 650 milliGRAM(s) Oral every 6 hours PRN Temp greater or equal to 38C (100.4F), Mild Pain (1 - 3)  diphenhydrAMINE 50 milliGRAM(s) Oral every 6 hours PRN EPS  diphenhydrAMINE Injectable 50 milliGRAM(s) IntraMuscular once PRN EPS from PRN for severe agitation  hydrOXYzine hydrochloride 50 milliGRAM(s) Oral every 6 hours PRN anxiety  nicotine  Polacrilex Gum 2 milliGRAM(s) Oral every 2 hours PRN Smoking Cessation  OLANZapine 5 milliGRAM(s) Oral every 6 hours PRN agitation  OLANZapine Injectable 5 milliGRAM(s) IntraMuscular once PRN severe agitation  sodium chloride 0.65% Nasal 1 Spray(s) Both Nostrils every 6 hours PRN Nasal Congestion  
MEDICATIONS  (PRN):  acetaminophen     Tablet .. 650 milliGRAM(s) Oral every 6 hours PRN Temp greater or equal to 38C (100.4F), Mild Pain (1 - 3)  diphenhydrAMINE 50 milliGRAM(s) Oral every 6 hours PRN EPS  diphenhydrAMINE Injectable 50 milliGRAM(s) IntraMuscular once PRN EPS from PRN for severe agitation  hydrOXYzine hydrochloride 50 milliGRAM(s) Oral every 6 hours PRN anxiety  ibuprofen  Tablet. 400 milliGRAM(s) Oral every 8 hours PRN Mild Pain (1 - 3), Moderate Pain (4 - 6)  nicotine  Polacrilex Gum 2 milliGRAM(s) Oral every 2 hours PRN Smoking Cessation  OLANZapine 5 milliGRAM(s) Oral every 6 hours PRN agitation  OLANZapine Injectable 5 milliGRAM(s) IntraMuscular once PRN severe agitation  sodium chloride 0.65% Nasal 1 Spray(s) Both Nostrils every 6 hours PRN Nasal Congestion  
MEDICATIONS  (PRN):  acetaminophen     Tablet .. 650 milliGRAM(s) Oral every 6 hours PRN Temp greater or equal to 38C (100.4F), Mild Pain (1 - 3)  diphenhydrAMINE 50 milliGRAM(s) Oral every 6 hours PRN EPS  diphenhydrAMINE Injectable 50 milliGRAM(s) IntraMuscular once PRN EPS from PRN for severe agitation  hydrOXYzine hydrochloride 50 milliGRAM(s) Oral every 6 hours PRN anxiety  nicotine  Polacrilex Gum 2 milliGRAM(s) Oral every 2 hours PRN Smoking Cessation  OLANZapine 5 milliGRAM(s) Oral every 6 hours PRN agitation  OLANZapine Injectable 5 milliGRAM(s) IntraMuscular once PRN severe agitation  sodium chloride 0.65% Nasal 1 Spray(s) Both Nostrils every 6 hours PRN Nasal Congestion  
MEDICATIONS  (PRN):  acetaminophen     Tablet .. 650 milliGRAM(s) Oral every 6 hours PRN Temp greater or equal to 38C (100.4F), Mild Pain (1 - 3)  diphenhydrAMINE 50 milliGRAM(s) Oral every 6 hours PRN EPS  diphenhydrAMINE Injectable 50 milliGRAM(s) IntraMuscular once PRN EPS from PRN for severe agitation  hydrOXYzine hydrochloride 50 milliGRAM(s) Oral every 6 hours PRN anxiety  ibuprofen  Tablet. 400 milliGRAM(s) Oral every 8 hours PRN Mild Pain (1 - 3), Moderate Pain (4 - 6)  nicotine  Polacrilex Gum 2 milliGRAM(s) Oral every 2 hours PRN Smoking Cessation  OLANZapine 5 milliGRAM(s) Oral every 6 hours PRN agitation  OLANZapine Injectable 5 milliGRAM(s) IntraMuscular once PRN severe agitation  sodium chloride 0.65% Nasal 1 Spray(s) Both Nostrils every 6 hours PRN Nasal Congestion  
MEDICATIONS  (PRN):  acetaminophen     Tablet .. 650 milliGRAM(s) Oral every 6 hours PRN Temp greater or equal to 38C (100.4F), Mild Pain (1 - 3)  diphenhydrAMINE 50 milliGRAM(s) Oral every 6 hours PRN EPS  diphenhydrAMINE Injectable 50 milliGRAM(s) IntraMuscular once PRN EPS from PRN for severe agitation  hydrOXYzine hydrochloride 50 milliGRAM(s) Oral every 6 hours PRN anxiety  nicotine  Polacrilex Gum 2 milliGRAM(s) Oral every 2 hours PRN Smoking Cessation  OLANZapine 5 milliGRAM(s) Oral every 6 hours PRN agitation  OLANZapine Injectable 5 milliGRAM(s) IntraMuscular once PRN severe agitation  
MEDICATIONS  (PRN):  acetaminophen     Tablet .. 650 milliGRAM(s) Oral every 6 hours PRN Temp greater or equal to 38C (100.4F), Mild Pain (1 - 3)  diphenhydrAMINE 50 milliGRAM(s) Oral every 6 hours PRN EPS  diphenhydrAMINE Injectable 50 milliGRAM(s) IntraMuscular once PRN EPS from PRN for severe agitation  hydrOXYzine hydrochloride 50 milliGRAM(s) Oral every 6 hours PRN anxiety  ibuprofen  Tablet. 400 milliGRAM(s) Oral every 8 hours PRN Mild Pain (1 - 3), Moderate Pain (4 - 6)  nicotine  Polacrilex Gum 2 milliGRAM(s) Oral every 2 hours PRN Smoking Cessation  OLANZapine 5 milliGRAM(s) Oral every 6 hours PRN agitation  OLANZapine Injectable 5 milliGRAM(s) IntraMuscular once PRN severe agitation  sodium chloride 0.65% Nasal 1 Spray(s) Both Nostrils every 6 hours PRN Nasal Congestion  
MEDICATIONS  (PRN):  acetaminophen     Tablet .. 650 milliGRAM(s) Oral every 6 hours PRN Temp greater or equal to 38C (100.4F), Mild Pain (1 - 3)  diphenhydrAMINE 50 milliGRAM(s) Oral every 6 hours PRN EPS  diphenhydrAMINE Injectable 50 milliGRAM(s) IntraMuscular once PRN EPS from PRN for severe agitation  hydrOXYzine hydrochloride 50 milliGRAM(s) Oral every 6 hours PRN anxiety  ibuprofen  Tablet. 400 milliGRAM(s) Oral every 8 hours PRN Mild Pain (1 - 3), Moderate Pain (4 - 6)  nicotine  Polacrilex Gum 2 milliGRAM(s) Oral every 2 hours PRN Smoking Cessation  OLANZapine 5 milliGRAM(s) Oral every 6 hours PRN agitation  OLANZapine Injectable 5 milliGRAM(s) IntraMuscular once PRN severe agitation  sodium chloride 0.65% Nasal 1 Spray(s) Both Nostrils every 6 hours PRN Nasal Congestion

## 2024-08-07 NOTE — BH INPATIENT PSYCHIATRY PROGRESS NOTE - NSTXPROBDISORG_PSY_ALL_CORE
DISORGANIZATION OF THOUGHT/BEHAVIOR
negative...
DISORGANIZATION OF THOUGHT/BEHAVIOR

## 2024-08-07 NOTE — BH INPATIENT PSYCHIATRY PROGRESS NOTE - NSTXMEDICGOAL_PSY_ALL_CORE
Take all medications as prescribed
Be able to describe the benefit of medication/treatment
Take all medications as prescribed
Be able to describe the benefit of medication/treatment
Be able to describe the benefit of medication/treatment
Take all medications as prescribed
Be able to describe the benefit of medication/treatment
Take all medications as prescribed

## 2024-08-07 NOTE — BH INPATIENT PSYCHIATRY PROGRESS NOTE - NSBHMETABOLIC_PSY_ALL_CORE_FT
BMI: BMI (kg/m2): 22.3 (07-12-24 @ 16:04)  HbA1c: A1C with Estimated Average Glucose Result: 5.0 % (07-03-24 @ 06:45)    Glucose:   BP: --Vital Signs Last 24 Hrs  T(C): 36.7 (08-07-24 @ 08:30), Max: 36.7 (08-07-24 @ 08:30)  T(F): 98.1 (08-07-24 @ 08:30), Max: 98.1 (08-07-24 @ 08:30)  HR: --  BP: --  BP(mean): --  RR: 18 (08-07-24 @ 08:30) (18 - 18)  SpO2: --    Orthostatic VS  08-07-24 @ 08:30  Lying BP: --/-- HR: --  Sitting BP: 130/76 HR: 81  Standing BP: 134/65 HR: 80  Site: --  Mode: --  Orthostatic VS  08-06-24 @ 07:39  Lying BP: --/-- HR: --  Sitting BP: 141/65 HR: 78  Standing BP: 148/72 HR: 84  Site: --  Mode: --    Lipid Panel: Date/Time: 07-03-24 @ 06:45  Cholesterol, Serum: 113  LDL Cholesterol Calculated: 54  HDL Cholesterol, Serum: 51  Total Cholesterol/HDL Ration Measurement: --  Triglycerides, Serum: 40

## 2024-08-07 NOTE — BH INPATIENT PSYCHIATRY PROGRESS NOTE - NSTXPROBVIOLNT_PSY_ALL_CORE
VIOLENT/AGGRESSIVE BEHAVIOR

## 2024-08-07 NOTE — BH INPATIENT PSYCHIATRY PROGRESS NOTE - NSTXDCOPNODATETRGT_PSY_ALL_CORE
30-Jul-2024
23-Jul-2024
24-Jul-2024
24-Jul-2024
06-Aug-2024
30-Jul-2024
06-Aug-2024
24-Jul-2024
30-Jul-2024
23-Jul-2024
06-Aug-2024
30-Jul-2024
30-Jul-2024
06-Aug-2024

## 2024-08-07 NOTE — BH INPATIENT PSYCHIATRY PROGRESS NOTE - NSTXDISORGDATETRGT_PSY_ALL_CORE
09-Aug-2024
20-Jul-2024
02-Aug-2024
02-Aug-2024
20-Jul-2024
20-Jul-2024
31-Jul-2024
09-Aug-2024
24-Jul-2024
24-Jul-2024
20-Jul-2024
02-Aug-2024
02-Aug-2024
09-Aug-2024
24-Jul-2024
09-Aug-2024
02-Aug-2024

## 2024-08-07 NOTE — BH INPATIENT PSYCHIATRY PROGRESS NOTE - NSTXDISORGGOAL_PSY_ALL_CORE
Will identify 2 coping skills that assist in organizing
Will verbalize decreased distress related to disturbance of thinking to 2 on a 10-point scale
Will identify 2 coping skills that assist in organizing
Will identify 2 coping skills that assist in organizing
Will verbalize decreased distress related to disturbance of thinking to 2 on a 10-point scale
Will verbalize decreased distress related to disturbance of thinking to 2 on a 10-point scale
Will make at least 3 goal and reality oriented statements during therapy
Will verbalize decreased distress related to disturbance of thinking to 2 on a 10-point scale
Will identify 2 coping skills that assist in organizing
Will verbalize decreased distress related to disturbance of thinking to 2 on a 10-point scale
Will verbalize decreased distress related to disturbance of thinking to 2 on a 10-point scale
Will identify 2 coping skills that assist in organizing

## 2024-08-07 NOTE — BH INPATIENT PSYCHIATRY PROGRESS NOTE - NSTXDISORGPROGRES_PSY_ALL_CORE
Improving
No Change
Improving
No Change
Improving
Improving
No Change

## 2024-08-07 NOTE — BH INPATIENT PSYCHIATRY PROGRESS NOTE - NSBHMSESPEECH_PSY_A_CORE
Abnormal as indicated, otherwise normal...
Normal volume, rate, productivity, spontaneity and articulation
Abnormal as indicated, otherwise normal...
Normal volume, rate, productivity, spontaneity and articulation
Abnormal as indicated, otherwise normal...
Normal volume, rate, productivity, spontaneity and articulation
Abnormal as indicated, otherwise normal...
Normal volume, rate, productivity, spontaneity and articulation
Abnormal as indicated, otherwise normal...
Normal volume, rate, productivity, spontaneity and articulation
Abnormal as indicated, otherwise normal...
Normal volume, rate, productivity, spontaneity and articulation
Abnormal as indicated, otherwise normal...

## 2024-08-07 NOTE — BH INPATIENT PSYCHIATRY PROGRESS NOTE - NSTXVIOLNTDATEEST_PSY_ALL_CORE
17-Jul-2024
12-Jul-2024
17-Jul-2024
12-Jul-2024
17-Jul-2024
12-Jul-2024
12-Jul-2024
17-Jul-2024

## 2024-08-07 NOTE — BH INPATIENT PSYCHIATRY PROGRESS NOTE - NSTXVIOLNTGOAL_PSY_ALL_CORE
Will decrease the number/duration/intensity of angry/aggressive outbursts
Will be able to walk away from an interpersonal conflict and use a self-soothing skill
Will be able to walk away from an interpersonal conflict and use a self-soothing skill
Will be able to express understanding of at least one trigger to their aggressive behavior
Will be able to walk away from an interpersonal conflict and use a self-soothing skill
Will be able to express understanding of at least one trigger to their aggressive behavior
Will decrease the number/duration/intensity of angry/aggressive outbursts
Will decrease the number/duration/intensity of angry/aggressive outbursts
Will be able to express understanding of at least one trigger to their aggressive behavior
Will be able to walk away from an interpersonal conflict and use a self-soothing skill
Will be able to walk away from an interpersonal conflict and use a self-soothing skill
Will be able to express understanding of at least one trigger to their aggressive behavior
Will be able to walk away from an interpersonal conflict and use a self-soothing skill
Will be able to walk away from an interpersonal conflict and use a self-soothing skill
Will decrease the number/duration/intensity of angry/aggressive outbursts
Will decrease the number/duration/intensity of angry/aggressive outbursts
Will be able to walk away from an interpersonal conflict and use a self-soothing skill
Will be able to walk away from an interpersonal conflict and use a self-soothing skill
Will decrease the number/duration/intensity of angry/aggressive outbursts
Will be able to walk away from an interpersonal conflict and use a self-soothing skill

## 2024-08-07 NOTE — BH INPATIENT PSYCHIATRY PROGRESS NOTE - NSBHMSETHTCONTENT_PSY_A_CORE
hypervigilance/Delusions/Ruminations/Other
hypervigilance/Unremarkable
hypervigilance/Delusions/Ruminations/Other
hypervigilance/Unremarkable
hypervigilance/Delusions/Ruminations
hypervigilance/Unremarkable
hypervigilance/Unremarkable
hypervigilance/Delusions/Ruminations
hypervigilance/Delusions/Ruminations/Other
hypervigilance/Unremarkable
hypervigilance/Delusions/Ruminations/Other
hypervigilance/Unremarkable
hypervigilance/Delusions/Ruminations/Other
hypervigilance/Unremarkable

## 2024-08-07 NOTE — BH INPATIENT PSYCHIATRY PROGRESS NOTE - NSBHATTESTTYPEVISIT_PSY_A_CORE
On-site Attending supervising SHERIF (99XXX codes)
Attending Only
On-site Attending supervising SHERIF (99XXX codes)
SHERIF without on-site Attending supervision
On-site Attending supervising SHERIF (99XXX codes)
Attending Only
Attending Only
On-site Attending supervising SHERIF (99XXX codes)
On-site Attending supervising SHERIF (99XXX codes)
SHERIF without on-site Attending supervision
On-site Attending supervising SHERIF (99XXX codes)
Attending Only
On-site Attending supervising SHERIF (99XXX codes)

## 2024-08-07 NOTE — BH INPATIENT PSYCHIATRY PROGRESS NOTE - NSTXVIOLNTDATETRGT_PSY_ALL_CORE
24-Jul-2024
24-Jul-2024
18-Jul-2024
31-Jul-2024
18-Jul-2024
24-Jul-2024
31-Jul-2024
18-Jul-2024
31-Jul-2024
24-Jul-2024
24-Jul-2024
31-Jul-2024
18-Jul-2024
31-Jul-2024
24-Jul-2024
31-Jul-2024

## 2024-08-07 NOTE — BH INPATIENT PSYCHIATRY PROGRESS NOTE - NSTXSUICIDDATEEST_PSY_ALL_CORE
17-Jul-2024
12-Jul-2024
17-Jul-2024
12-Jul-2024
12-Jul-2024
17-Jul-2024
12-Jul-2024
17-Jul-2024
17-Jul-2024

## 2024-08-07 NOTE — BH INPATIENT PSYCHIATRY PROGRESS NOTE - NSTXPSYCHODATEEST_PSY_ALL_CORE
17-Jul-2024
12-Jul-2024
12-Jul-2024
17-Jul-2024
12-Jul-2024
17-Jul-2024
12-Jul-2024
17-Jul-2024

## 2024-08-07 NOTE — BH INPATIENT PSYCHIATRY PROGRESS NOTE - NSBHMSETHTPROC_PSY_A_CORE
Linear
Disorganized/Perseverative/Impaired reasoning
Disorganized/Overinclusive/Perseverative/Impaired reasoning
Linear/Perseverative/Impaired reasoning
Linear
Perseverative/Flight of ideas/Impaired reasoning
Perseverative/Impaired reasoning
Linear
Disorganized/Perseverative/Impaired reasoning
Disorganized/Overinclusive/Circumstantial/Perseverative/Impaired reasoning
Linear/Impaired reasoning
Perseverative/Impaired reasoning
Disorganized/Tangential
Disorganized/Overinclusive/Circumstantial/Perseverative/Impaired reasoning
Disorganized/Overinclusive/Circumstantial/Perseverative/Impaired reasoning
Perseverative/Impaired reasoning
Impaired reasoning
Disorganized/Tangential
Linear
Disorganized/Overinclusive/Circumstantial/Perseverative/Impaired reasoning

## 2024-08-07 NOTE — BH INPATIENT PSYCHIATRY PROGRESS NOTE - NSBHMSERELATED_PSY_A_CORE
Other
Other
Good
Other
Good
Other
Fair
Other
Good
Other
Poor
Other
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
## 2024-08-07 NOTE — BH INPATIENT PSYCHIATRY PROGRESS NOTE - NSBHCHARTREVIEWVS_PSY_A_CORE FT
Vital Signs Last 24 Hrs  T(C): 36.1 (07-16-24 @ 07:25), Max: 36.1 (07-16-24 @ 07:25)  T(F): 97 (07-16-24 @ 07:25), Max: 97 (07-16-24 @ 07:25)  HR: --  BP: --  BP(mean): --  RR: 18 (07-16-24 @ 07:25) (18 - 18)  SpO2: --    Orthostatic VS  07-16-24 @ 07:25  Lying BP: --/-- HR: --  Sitting BP: 126/86 HR: 97  Standing BP: 129/72 HR: 81  Site: --  Mode: --  Orthostatic VS  07-15-24 @ 07:29  Lying BP: --/-- HR: --  Sitting BP: 135/65 HR: 76  Standing BP: 136/78 HR: 97  Site: --  Mode: --  
Vital Signs Last 24 Hrs  T(C): 36.2 (07-18-24 @ 08:33), Max: 36.2 (07-18-24 @ 08:33)  T(F): 97.1 (07-18-24 @ 08:33), Max: 97.1 (07-18-24 @ 08:33)  HR: --  BP: --  BP(mean): --  RR: 18 (07-18-24 @ 08:33) (18 - 18)  SpO2: --    Orthostatic VS  07-18-24 @ 08:33  Lying BP: --/-- HR: --  Sitting BP: 140/75 HR: 79  Standing BP: 121/78 HR: 81  Site: --  Mode: --  Orthostatic VS  07-17-24 @ 07:49  Lying BP: --/-- HR: --  Sitting BP: 127/66 HR: 73  Standing BP: 128/80 HR: 76  Site: --  Mode: --  
Vital Signs Last 24 Hrs  T(C): 36.3 (08-06-24 @ 07:39), Max: 36.3 (08-06-24 @ 07:39)  T(F): 97.4 (08-06-24 @ 07:39), Max: 97.4 (08-06-24 @ 07:39)  HR: --  BP: --  BP(mean): --  RR: 19 (08-06-24 @ 07:39) (19 - 19)  SpO2: --    Orthostatic VS  08-06-24 @ 07:39  Lying BP: --/-- HR: --  Sitting BP: 141/65 HR: 78  Standing BP: 148/72 HR: 84  Site: --  Mode: --  Orthostatic VS  08-05-24 @ 07:30  Lying BP: --/-- HR: --  Sitting BP: 137/63 HR: 75  Standing BP: 142/78 HR: 79  Site: upper left arm  Mode: electronic  
Vital Signs Last 24 Hrs  T(C): 36.4 (07-14-24 @ 08:07), Max: 36.4 (07-14-24 @ 08:07)  T(F): 97.5 (07-14-24 @ 08:07), Max: 97.5 (07-14-24 @ 08:07)  HR: --  BP: --  BP(mean): --  RR: 17 (07-14-24 @ 08:07) (17 - 17)  SpO2: --    Orthostatic VS  07-14-24 @ 08:07  Lying BP: --/-- HR: --  Sitting BP: 127/65 HR: 74  Standing BP: 125/61 HR: 75  Site: upper left arm  Mode: electronic  Orthostatic VS  07-13-24 @ 07:31  Lying BP: --/-- HR: --  Sitting BP: 124/69 HR: 66  Standing BP: 122/61 HR: 69  Site: --  Mode: --  Orthostatic VS  07-12-24 @ 16:04  Lying BP: --/-- HR: --  Sitting BP: 135/62 HR: 86  Standing BP: 134/68 HR: 90  Site: --  Mode: --  
Vital Signs Last 24 Hrs  T(C): 36.4 (07-26-24 @ 07:50), Max: 36.4 (07-26-24 @ 07:48)  T(F): 97.5 (07-26-24 @ 07:50), Max: 97.5 (07-26-24 @ 07:48)  HR: --  BP: --  BP(mean): --  RR: 20 (07-26-24 @ 07:50) (20 - 20)  SpO2: --    Orthostatic VS  07-26-24 @ 07:50  Lying BP: --/-- HR: --  Sitting BP: 128/79 HR: 77  Standing BP: 124/80 HR: 74  Site: --  Mode: --  Orthostatic VS  07-26-24 @ 07:48  Lying BP: --/-- HR: --  Sitting BP: 128/79 HR: 77  Standing BP: 124/80 HR: 74  Site: --  Mode: --  Orthostatic VS  07-25-24 @ 07:21  Lying BP: --/-- HR: --  Sitting BP: 131/57 HR: 74  Standing BP: 135/72 HR: 79  Site: --  Mode: --  
Vital Signs Last 24 Hrs  T(C): 36.8 (07-22-24 @ 07:30), Max: 36.8 (07-22-24 @ 07:30)  T(F): 98.2 (07-22-24 @ 07:30), Max: 98.2 (07-22-24 @ 07:30)  HR: --  BP: --  BP(mean): --  RR: --  SpO2: --    Orthostatic VS  07-22-24 @ 07:30  Lying BP: --/-- HR: --  Sitting BP: 118/62 HR: 68  Standing BP: 111/60 HR: 76  Site: --  Mode: --  Orthostatic VS  07-21-24 @ 07:41  Lying BP: --/-- HR: --  Sitting BP: 124/81 HR: 90  Standing BP: 139/61 HR: 80  Site: --  Mode: --  
Vital Signs Last 24 Hrs  T(C): 36.6 (07-30-24 @ 08:28), Max: 36.6 (07-30-24 @ 08:28)  T(F): 97.8 (07-30-24 @ 08:28), Max: 97.8 (07-30-24 @ 08:28)  HR: --  BP: --  BP(mean): --  RR: --  SpO2: --    Orthostatic VS  07-30-24 @ 08:28  Lying BP: --/-- HR: --  Sitting BP: 124/70 HR: 76  Standing BP: 127/78 HR: 81  Site: --  Mode: --  Orthostatic VS  07-29-24 @ 22:49  Lying BP: --/-- HR: --  Sitting BP: 138/60 HR: 84  Standing BP: 139/73 HR: 85  Site: --  Mode: --  Orthostatic VS  07-29-24 @ 07:48  Lying BP: --/-- HR: --  Sitting BP: 133/77 HR: 70  Standing BP: 144/72 HR: 81  Site: --  Mode: --  Orthostatic VS  07-29-24 @ 07:36  Lying BP: --/-- HR: --  Sitting BP: 133/77 HR: 70  Standing BP: 144/72 HR: 81  Site: --  Mode: --  
Vital Signs Last 24 Hrs  T(C): 36.6 (07-25-24 @ 07:21), Max: 36.6 (07-25-24 @ 07:21)  T(F): 97.8 (07-25-24 @ 07:21), Max: 97.8 (07-25-24 @ 07:21)  HR: --  BP: --  BP(mean): --  RR: 18 (07-25-24 @ 07:21) (18 - 18)  SpO2: --    Orthostatic VS  07-25-24 @ 07:21  Lying BP: --/-- HR: --  Sitting BP: 131/57 HR: 74  Standing BP: 135/72 HR: 79  Site: --  Mode: --  Orthostatic VS  07-24-24 @ 07:33  Lying BP: --/-- HR: --  Sitting BP: 124/64 HR: 69  Standing BP: 127/70 HR: 79  Site: --  Mode: --  
Vital Signs Last 24 Hrs  T(C): 36.4 (07-29-24 @ 07:48), Max: 36.4 (07-29-24 @ 07:36)  T(F): 97.5 (07-29-24 @ 07:48), Max: 97.5 (07-29-24 @ 07:36)  HR: --  BP: --  BP(mean): --  RR: 18 (07-29-24 @ 07:48) (18 - 18)  SpO2: --    Orthostatic VS  07-29-24 @ 07:48  Lying BP: --/-- HR: --  Sitting BP: 133/77 HR: 70  Standing BP: 144/72 HR: 81  Site: --  Mode: --  Orthostatic VS  07-29-24 @ 07:36  Lying BP: --/-- HR: --  Sitting BP: 133/77 HR: 70  Standing BP: 144/72 HR: 81  Site: --  Mode: --  Orthostatic VS  07-28-24 @ 07:36  Lying BP: --/-- HR: --  Sitting BP: 123/70 HR: 78  Standing BP: 131/64 HR: 79  Site: --  Mode: --  
Vital Signs Last 24 Hrs  T(C): 36.7 (07-15-24 @ 07:29), Max: 36.7 (07-15-24 @ 07:29)  T(F): 98 (07-15-24 @ 07:29), Max: 98 (07-15-24 @ 07:29)  HR: --  BP: --  BP(mean): --  RR: 20 (07-15-24 @ 07:29) (20 - 20)  SpO2: --    Orthostatic VS  07-15-24 @ 07:29  Lying BP: --/-- HR: --  Sitting BP: 135/65 HR: 76  Standing BP: 136/78 HR: 97  Site: --  Mode: --  Orthostatic VS  07-14-24 @ 08:07  Lying BP: --/-- HR: --  Sitting BP: 127/65 HR: 74  Standing BP: 125/61 HR: 75  Site: upper left arm  Mode: electronic  
Vital Signs Last 24 Hrs  T(C): 36.4 (07-31-24 @ 08:46), Max: 36.4 (07-31-24 @ 08:46)  T(F): 97.5 (07-31-24 @ 08:46), Max: 97.5 (07-31-24 @ 08:46)  HR: --  BP: --  BP(mean): --  RR: 19 (07-31-24 @ 08:46) (19 - 19)  SpO2: --    Orthostatic VS  07-31-24 @ 08:46  Lying BP: --/-- HR: --  Sitting BP: 140/60 HR: 74  Standing BP: 147/79 HR: 79  Site: --  Mode: --  Orthostatic VS  07-30-24 @ 08:28  Lying BP: --/-- HR: --  Sitting BP: 124/70 HR: 76  Standing BP: 127/78 HR: 81  Site: --  Mode: --  Orthostatic VS  07-29-24 @ 22:49  Lying BP: --/-- HR: --  Sitting BP: 138/60 HR: 84  Standing BP: 139/73 HR: 85  Site: --  Mode: --  
Vital Signs Last 24 Hrs  T(C): 36.4 (07-17-24 @ 07:49), Max: 36.4 (07-17-24 @ 07:49)  T(F): 97.6 (07-17-24 @ 07:49), Max: 97.6 (07-17-24 @ 07:49)  HR: --  BP: --  BP(mean): --  RR: 19 (07-17-24 @ 07:49) (19 - 19)  SpO2: --    Orthostatic VS  07-17-24 @ 07:49  Lying BP: --/-- HR: --  Sitting BP: 127/66 HR: 73  Standing BP: 128/80 HR: 76  Site: --  Mode: --  Orthostatic VS  07-16-24 @ 07:25  Lying BP: --/-- HR: --  Sitting BP: 126/86 HR: 97  Standing BP: 129/72 HR: 81  Site: --  Mode: --  
Vital Signs Last 24 Hrs  T(C): 36.6 (07-13-24 @ 07:31), Max: 36.6 (07-13-24 @ 07:31)  T(F): 97.8 (07-13-24 @ 07:31), Max: 97.8 (07-13-24 @ 07:31)  HR: --  BP: --  BP(mean): --  RR: 18 (07-13-24 @ 07:31) (18 - 18)  SpO2: --    Orthostatic VS  07-13-24 @ 07:31  Lying BP: --/-- HR: --  Sitting BP: 124/69 HR: 66  Standing BP: 122/61 HR: 69  Site: --  Mode: --  Orthostatic VS  07-12-24 @ 16:04  Lying BP: --/-- HR: --  Sitting BP: 135/62 HR: 86  Standing BP: 134/68 HR: 90  Site: --  Mode: --  
Vital Signs Last 24 Hrs  T(C): 36.3 (07-23-24 @ 08:39), Max: 36.3 (07-23-24 @ 08:39)  T(F): 97.3 (07-23-24 @ 08:39), Max: 97.3 (07-23-24 @ 08:39)  HR: --  BP: --  BP(mean): --  RR: 17 (07-23-24 @ 08:39) (17 - 17)  SpO2: --    Orthostatic VS  07-23-24 @ 08:39  Lying BP: --/-- HR: --  Sitting BP: 127/61 HR: 75  Standing BP: 131/73 HR: 78  Site: --  Mode: --  Orthostatic VS  07-22-24 @ 07:30  Lying BP: --/-- HR: --  Sitting BP: 118/62 HR: 68  Standing BP: 111/60 HR: 76  Site: --  Mode: --  
Vital Signs Last 24 Hrs  T(C): 36.7 (08-07-24 @ 08:30), Max: 36.7 (08-07-24 @ 08:30)  T(F): 98.1 (08-07-24 @ 08:30), Max: 98.1 (08-07-24 @ 08:30)  HR: --  BP: --  BP(mean): --  RR: 18 (08-07-24 @ 08:30) (18 - 18)  SpO2: --    Orthostatic VS  08-07-24 @ 08:30  Lying BP: --/-- HR: --  Sitting BP: 130/76 HR: 81  Standing BP: 134/65 HR: 80  Site: --  Mode: --  Orthostatic VS  08-06-24 @ 07:39  Lying BP: --/-- HR: --  Sitting BP: 141/65 HR: 78  Standing BP: 148/72 HR: 84  Site: --  Mode: --  
Vital Signs Last 24 Hrs  T(C): 36.8 (07-19-24 @ 07:46), Max: 36.8 (07-19-24 @ 07:46)  T(F): 98.3 (07-19-24 @ 07:46), Max: 98.3 (07-19-24 @ 07:46)  HR: --  BP: --  BP(mean): --  RR: 17 (07-19-24 @ 07:46) (17 - 17)  SpO2: --    Orthostatic VS  07-19-24 @ 07:46  Lying BP: --/-- HR: --  Sitting BP: 129/67 HR: 74  Standing BP: 122/69 HR: 78  Site: --  Mode: --  Orthostatic VS  07-18-24 @ 08:33  Lying BP: --/-- HR: --  Sitting BP: 140/75 HR: 79  Standing BP: 121/78 HR: 81  Site: --  Mode: --  
Vital Signs Last 24 Hrs  T(C): 36.6 (08-02-24 @ 07:49), Max: 36.6 (08-02-24 @ 07:49)  T(F): 97.8 (08-02-24 @ 07:49), Max: 97.8 (08-02-24 @ 07:49)  HR: --  BP: --  BP(mean): --  RR: 20 (08-02-24 @ 07:49) (20 - 20)  SpO2: --    Orthostatic VS  08-02-24 @ 07:49  Lying BP: --/-- HR: --  Sitting BP: 132/70 HR: 78  Standing BP: 121/85 HR: 80  Site: --  Mode: --  Orthostatic VS  08-01-24 @ 07:13  Lying BP: --/-- HR: --  Sitting BP: 136/64 HR: 77  Standing BP: 130/76 HR: 73  Site: --  Mode: --  
Vital Signs Last 24 Hrs  T(C): 36.4 (08-05-24 @ 07:30), Max: 36.4 (08-05-24 @ 07:30)  T(F): 97.6 (08-05-24 @ 07:30), Max: 97.6 (08-05-24 @ 07:30)  HR: --  BP: --  BP(mean): --  RR: --  SpO2: --    Orthostatic VS  08-05-24 @ 07:30  Lying BP: --/-- HR: --  Sitting BP: 137/63 HR: 75  Standing BP: 142/78 HR: 79  Site: upper left arm  Mode: electronic  Orthostatic VS  08-04-24 @ 07:50  Lying BP: --/-- HR: --  Sitting BP: 143/79 HR: 93  Standing BP: 134/79 HR: 92  Site: --  Mode: --  
Vital Signs Last 24 Hrs  T(C): 36.2 (07-24-24 @ 07:33), Max: 36.2 (07-24-24 @ 07:33)  T(F): 97.1 (07-24-24 @ 07:33), Max: 97.1 (07-24-24 @ 07:33)  HR: --  BP: --  BP(mean): --  RR: 19 (07-24-24 @ 07:33) (19 - 19)  SpO2: --    Orthostatic VS  07-24-24 @ 07:33  Lying BP: --/-- HR: --  Sitting BP: 124/64 HR: 69  Standing BP: 127/70 HR: 79  Site: --  Mode: --  Orthostatic VS  07-23-24 @ 08:39  Lying BP: --/-- HR: --  Sitting BP: 127/61 HR: 75  Standing BP: 131/73 HR: 78  Site: --  Mode: --  
Vital Signs Last 24 Hrs  T(C): 36.9 (08-01-24 @ 07:13), Max: 36.9 (08-01-24 @ 07:13)  T(F): 98.5 (08-01-24 @ 07:13), Max: 98.5 (08-01-24 @ 07:13)  HR: --  BP: --  BP(mean): --  RR: 19 (08-01-24 @ 07:13) (19 - 19)  SpO2: --    Orthostatic VS  08-01-24 @ 07:13  Lying BP: --/-- HR: --  Sitting BP: 136/64 HR: 77  Standing BP: 130/76 HR: 73  Site: --  Mode: --  Orthostatic VS  07-31-24 @ 08:46  Lying BP: --/-- HR: --  Sitting BP: 140/60 HR: 74  Standing BP: 147/79 HR: 79  Site: --  Mode: --

## 2024-08-07 NOTE — BH INPATIENT PSYCHIATRY PROGRESS NOTE - NSBHMSEGAIT_PSY_A_CORE
lying in stretcher/Normal gait / station

## 2024-08-07 NOTE — BH INPATIENT PSYCHIATRY PROGRESS NOTE - NSBHATTESTATTENDNAMEFT_PSY_A_CORE
[place] 
[place] 
Sebas 
[place] 
[place]ebas 
Sebas

## 2024-08-07 NOTE — BH INPATIENT PSYCHIATRY PROGRESS NOTE - NSCGIIMPROVESX_PSY_ALL_CORE

## 2024-08-07 NOTE — BH INPATIENT PSYCHIATRY PROGRESS NOTE - NSICDXBHPRIMARYDX_PSY_ALL_CORE
Psychosis   F29  

## 2024-08-07 NOTE — BH INPATIENT PSYCHIATRY PROGRESS NOTE - NSTXDCFAMDATETRGT_PSY_ALL_CORE
30-Jul-2024
23-Jul-2024
24-Jul-2024
06-Aug-2024
30-Jul-2024
30-Jul-2024
24-Jul-2024
06-Aug-2024
30-Jul-2024
23-Jul-2024
06-Aug-2024
30-Jul-2024
24-Jul-2024
06-Aug-2024
30-Jul-2024
30-Jul-2024

## 2024-08-07 NOTE — BH INPATIENT PSYCHIATRY PROGRESS NOTE - NSBHMSEIMPULSE_PSY_A_CORE
Normal
Normal
Impaired
Normal
Other
Normal
Impaired
Normal

## 2024-08-07 NOTE — BH INPATIENT PSYCHIATRY PROGRESS NOTE - NSBHMSEBEHAV_PSY_A_CORE
Cooperative/Other
Cooperative/Other
Cooperative
Cooperative/Other
Cooperative
Cooperative/Other
Cooperative
Cooperative/Other
Cooperative/Other
Cooperative
Cooperative/Other

## 2024-08-07 NOTE — BH INPATIENT PSYCHIATRY PROGRESS NOTE - NSTXMEDICINTERMD_PSY_ALL_CORE
med management, psychoed, therapy, COBOS 

## 2024-08-07 NOTE — BH INPATIENT PSYCHIATRY PROGRESS NOTE - NSTXDCFAMGOAL_PSY_ALL_CORE
Will agree to involve supports/family in treatment and discharge planning

## 2024-08-07 NOTE — BH INPATIENT PSYCHIATRY PROGRESS NOTE - NSTXSUICIDDATETRGT_PSY_ALL_CORE
24-Jul-2024
18-Jul-2024
31-Jul-2024
31-Jul-2024
18-Jul-2024
24-Jul-2024
31-Jul-2024
31-Jul-2024
24-Jul-2024
31-Jul-2024
18-Jul-2024
31-Jul-2024
24-Jul-2024
18-Jul-2024
31-Jul-2024
24-Jul-2024
31-Jul-2024
24-Jul-2024

## 2024-08-07 NOTE — BH DISCHARGE NOTE NURSING/SOCIAL WORK/PSYCH REHAB - NSCDUDCCRISIS_PSY_A_CORE
[facility]  1 [phone]) [facility]-WELL ([phone])  Text "WELL" to 98990  Website: www.[employer]/.[employer] 1 (769) 621-HOPE (8383) Website: www.safehorizon.org/.National Suicide Prevention Lifeline 9 (196) 211-9668/.  [employer]  1 (460) LIFENET (180-1238)/.  [facility]  [phone] 682-1358/.  [facility]  [address] [place], [place] [address] [phone]   Hours:  Monday through Friday from 9 AM to 3 PM/988 Suicide and Crisis Lifeline

## 2024-08-07 NOTE — BH INPATIENT PSYCHIATRY PROGRESS NOTE - NSTXPSYCHOINTERMD_PSY_ALL_CORE
med management, psychoed, therapy 

## 2024-08-07 NOTE — BH INPATIENT PSYCHIATRY PROGRESS NOTE - NSBHMSEPERCEPT_PSY_A_CORE
possibly hears "whispers"/No abnormalities

## 2024-08-07 NOTE — BH DISCHARGE NOTE NURSING/SOCIAL WORK/PSYCH REHAB - NSDCPEEMAIL_GEN_ALL_CORE
[facility] email [email or web address]

## 2024-08-07 NOTE — BH INPATIENT PSYCHIATRY PROGRESS NOTE - NSTXVIOLNTINTERMD_PSY_ALL_CORE
med management, psychoed, therapy.

## 2024-08-07 NOTE — BH INPATIENT PSYCHIATRY PROGRESS NOTE - NSBHMSEBODY_PSY_A_CORE
Underweight
Average build
Underweight
Average build
Underweight
Average build
Underweight
Average build
Underweight

## 2024-08-07 NOTE — BH INPATIENT PSYCHIATRY PROGRESS NOTE - NSTXDCFAMPROGRES_PSY_ALL_CORE
Improving
No Change
Improving
No Change
Improving
No Change
Improving
No Change
Improving
No Change
Improving
Improving
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
## 2024-08-07 NOTE — BH INPATIENT PSYCHIATRY PROGRESS NOTE - NSBHATTESTBILLING_PSY_A_CORE
80125-Juepmcugms OBS or IP - moderate complexity OR 35-49 mins
10959-Vzdkrpupri OBS or IP - moderate complexity OR 35-49 mins
85603-Vmgvpmqlki OBS or IP - moderate complexity OR 35-49 mins
26873-Uhxlrapuax OBS or IP - low complexity OR 25-34 mins
40953-Sxgxvkpcdb OBS or IP - moderate complexity OR 35-49 mins
10082-Sflpffahnx OBS or IP - moderate complexity OR 35-49 mins
27713-Egrokrvbbl OBS or IP - moderate complexity OR 35-49 mins
73598-Rspnhxmtje OBS or IP - low complexity OR 25-34 mins
28612-Nwbmgccsai OBS or IP - low complexity OR 25-34 mins
17972-Psvpxrcwem OBS or IP - moderate complexity OR 35-49 mins
51619-Opdkbqyxjj OBS or IP - low complexity OR 25-34 mins
51841-Spjjlxyykm OBS or IP - low complexity OR 25-34 mins
22217-Rrbxcxfemb OBS or IP - moderate complexity OR 35-49 mins
46203-Kbuhqtnnra OBS or IP - moderate complexity OR 35-49 mins
04304-Chzggssawz OBS or IP - low complexity OR 25-34 mins
93182-Wueiermqdc OBS or IP - moderate complexity OR 35-49 mins
26469-Jzjlglffnz OBS or IP - moderate complexity OR 35-49 mins
49953-Mwwaagieph OBS or IP - moderate complexity OR 35-49 mins
92754-Lzvwbufgbk OBS or IP - moderate complexity OR 35-49 mins

## 2024-08-07 NOTE — BH INPATIENT PSYCHIATRY PROGRESS NOTE - NSTXMEDICDATEEST_PSY_ALL_CORE
17-Jul-2024
12-Jul-2024
17-Jul-2024
12-Jul-2024
12-Jul-2024
17-Jul-2024
12-Jul-2024
17-Jul-2024

## 2024-08-07 NOTE — BH INPATIENT PSYCHIATRY PROGRESS NOTE - NSTXDCOPNODATEEST_PSY_ALL_CORE
16-Jul-2024
30-Jul-2024
16-Jul-2024
23-Jul-2024
16-Jul-2024
23-Jul-2024
16-Jul-2024
30-Jul-2024
30-Jul-2024
23-Jul-2024
30-Jul-2024
16-Jul-2024

## 2024-08-07 NOTE — BH DISCHARGE NOTE NURSING/SOCIAL WORK/PSYCH REHAB - NSDCPRGOAL_PSY_ALL_CORE
The patient met her specified goal to identify 2 coping skills that assist in organizing for her disorganization of thought/behavior goal. Progress was evidenced by an improved ability to tolerate groups in full-duration and engaging in appropriate and supportive dialogue with peers during communal engagement. The patient attended 82% of the Psychiatric Rehabilitation groups throughout her stay. Patient completed her safety plan prior to discharge.

## 2024-08-07 NOTE — BH INPATIENT PSYCHIATRY PROGRESS NOTE - NSTXPSYCHOGOAL_PSY_ALL_CORE
Will report using relaxation skills 3 times a day to reduce anxiety about delusions/hallucinations
Will engage in a 15 minute conversation with no irrational content
Will report using relaxation skills 3 times a day to reduce anxiety about delusions/hallucinations
Will be able to report experiencing hallucinations to staff
Will engage in a 15 minute conversation with no irrational content
Will report using relaxation skills 3 times a day to reduce anxiety about delusions/hallucinations
Will be able to report experiencing hallucinations to staff
Will engage in a 15 minute conversation with no irrational content
Will engage in a 15 minute conversation with no irrational content
Will report using relaxation skills 3 times a day to reduce anxiety about delusions/hallucinations
Will engage in a 15 minute conversation with no irrational content
Will report using relaxation skills 3 times a day to reduce anxiety about delusions/hallucinations
Will engage in a 15 minute conversation with no irrational content
Will be able to report experiencing hallucinations to staff
Will report using relaxation skills 3 times a day to reduce anxiety about delusions/hallucinations
Will be able to report experiencing hallucinations to staff
Will engage in a 15 minute conversation with no irrational content

## 2024-08-07 NOTE — BH DISCHARGE NOTE NURSING/SOCIAL WORK/PSYCH REHAB - PATIENT PORTAL LINK FT
You can access the FollowMyHealth Patient Portal offered by [facility] by registering at the following website: [email or web address]. By joining [facility] FollowMyHealth portal, you will also be able to view your health information using other applications (apps) compatible with our system.

## 2024-08-07 NOTE — BH DISCHARGE NOTE NURSING/SOCIAL WORK/PSYCH REHAB - DISCHARGE INSTRUCTIONS AFTERCARE APPOINTMENTS
In order to check the location, date, or time of your aftercare appointment, please refer to your Discharge Instructions Document given to you upon leaving the hospital.  If you have lost the instructions please call [phone]

## 2024-08-07 NOTE — BH INPATIENT PSYCHIATRY PROGRESS NOTE - NSTXMEDICDATETRGT_PSY_ALL_CORE
31-Jul-2024
18-Jul-2024
24-Jul-2024
31-Jul-2024
31-Jul-2024
18-Jul-2024
31-Jul-2024
18-Jul-2024
24-Jul-2024
24-Jul-2024
31-Jul-2024
24-Jul-2024
18-Jul-2024
31-Jul-2024
24-Jul-2024
24-Jul-2024

## 2024-08-08 VITALS — TEMPERATURE: 98 F | RESPIRATION RATE: 16 BRPM

## 2024-08-08 RX ADMIN — Medication 1 PATCH: at 07:07

## 2024-08-08 RX ADMIN — Medication 1 DROP(S): at 08:20

## 2024-08-08 RX ADMIN — Medication 325 MILLIGRAM(S): at 08:20

## 2024-08-08 RX ADMIN — Medication 1 PATCH: at 09:06

## 2024-08-08 RX ADMIN — Medication 1 PATCH: at 08:21

## 2024-08-08 RX ADMIN — Medication 1 TABLET(S): at 08:20

## 2024-08-08 RX ADMIN — Medication 1 MILLIGRAM(S): at 08:20

## 2024-08-08 RX ADMIN — Medication 10 MILLIGRAM(S): at 08:20

## 2024-08-08 RX ADMIN — Medication 81 MILLIGRAM(S): at 08:20

## 2024-08-09 PROBLEM — F41.9 ANXIETY DISORDER, UNSPECIFIED: Chronic | Status: ACTIVE | Noted: 2024-07-02

## 2024-08-15 NOTE — SOCIAL WORK POST DISCHARGE FOLLOW UP NOTE - NSBHSWFOLLOWUP_PSY_ALL_CORE_FT
Writer informed Patient missed aftercare appointment at [facility]. Per  staff member Elvira, Patient can reschedule or come in as a alk-in for intake on Mondays or Thursdays.  Writer called Patient and left VM to return call and explaining walk-in option at . Jaker also emailed Patient's  regarding missed appointment.

## 2024-11-12 PROBLEM — R01.1 CARDIAC MURMUR, UNSPECIFIED: Chronic | Status: ACTIVE | Noted: 2024-07-02

## 2024-11-12 PROBLEM — F41.9 ANXIETY DISORDER, UNSPECIFIED: Chronic | Status: ACTIVE | Noted: 2024-07-02

## 2024-11-22 NOTE — DIETITIAN INITIAL EVALUATION ADULT - NSICDXPASTSURGICALHX_GEN_ALL_CORE_FT
Ellie Greenberg and let him know that his CT neck did not show anything abnormal.      That it is more likely that his recurrent parotid swelling is due to not drinking enough water.  Sunny Peters recommends he drink more water to see if that will prevent his recurrent swelling.  If that does not help, He would recommend rheumatologic consultation (even though his blood testing and minor salivary gland biopsy was negative, it is still a distant possibility that autoimmune disease is causing his problem).    Patient is willing to try the increased water intake. States he is scheduled to see [facility] for a VNG in February. Will let us know if the increase in water helps the sharp pain in the ear area. If no better Dr. Correa will send a referral to rheumatology consult.   
PAST SURGICAL HISTORY:  No significant past surgical history

## 2025-02-10 ENCOUNTER — INPATIENT (INPATIENT)
Facility: HOSPITAL | Age: 40
LOS: 30 days | Discharge: HOME | End: 2025-03-13
Attending: STUDENT IN AN ORGANIZED HEALTH CARE EDUCATION/TRAINING PROGRAM | Admitting: STUDENT IN AN ORGANIZED HEALTH CARE EDUCATION/TRAINING PROGRAM
Payer: MEDICAID

## 2025-02-10 VITALS
SYSTOLIC BLOOD PRESSURE: 157 MMHG | DIASTOLIC BLOOD PRESSURE: 69 MMHG | HEART RATE: 108 BPM | TEMPERATURE: 98 F | OXYGEN SATURATION: 100 % | RESPIRATION RATE: 18 BRPM

## 2025-02-10 DIAGNOSIS — F32.A DEPRESSION, UNSPECIFIED: ICD-10-CM

## 2025-02-10 DIAGNOSIS — T14.91XA SUICIDE ATTEMPT, INITIAL ENCOUNTER: ICD-10-CM

## 2025-02-10 LAB
A1C WITH ESTIMATED AVERAGE GLUCOSE RESULT: 5 % — SIGNIFICANT CHANGE UP (ref 4–5.6)
ADD ON TEST-SPECIMEN IN LAB: SIGNIFICANT CHANGE UP
ADD ON TEST-SPECIMEN IN LAB: SIGNIFICANT CHANGE UP
ALBUMIN SERPL ELPH-MCNC: 3.9 G/DL — SIGNIFICANT CHANGE UP (ref 3.3–5)
ALP SERPL-CCNC: 95 U/L — SIGNIFICANT CHANGE UP (ref 40–120)
ALT FLD-CCNC: 14 U/L — SIGNIFICANT CHANGE UP (ref 4–33)
AMPHET UR-MCNC: NEGATIVE — SIGNIFICANT CHANGE UP
ANION GAP SERPL CALC-SCNC: 11 MMOL/L — SIGNIFICANT CHANGE UP (ref 7–14)
ANISOCYTOSIS BLD QL: SLIGHT — SIGNIFICANT CHANGE UP
APAP SERPL-MCNC: <10 UG/ML — LOW (ref 15–25)
APPEARANCE UR: CLEAR — SIGNIFICANT CHANGE UP
AST SERPL-CCNC: 20 U/L — SIGNIFICANT CHANGE UP (ref 4–32)
BARBITURATES UR SCN-MCNC: NEGATIVE — SIGNIFICANT CHANGE UP
BASOPHILS # BLD AUTO: 0 K/UL — SIGNIFICANT CHANGE UP (ref 0–0.2)
BASOPHILS NFR BLD AUTO: 0 % — SIGNIFICANT CHANGE UP (ref 0–2)
BENZODIAZ UR-MCNC: NEGATIVE — SIGNIFICANT CHANGE UP
BILIRUB SERPL-MCNC: 0.4 MG/DL — SIGNIFICANT CHANGE UP (ref 0.2–1.2)
BILIRUB UR-MCNC: NEGATIVE — SIGNIFICANT CHANGE UP
BUN SERPL-MCNC: 6 MG/DL — LOW (ref 7–23)
CALCIUM SERPL-MCNC: 9 MG/DL — SIGNIFICANT CHANGE UP (ref 8.4–10.5)
CHLORIDE SERPL-SCNC: 106 MMOL/L — SIGNIFICANT CHANGE UP (ref 98–107)
CHOLEST SERPL-MCNC: 142 MG/DL — SIGNIFICANT CHANGE UP
CO2 SERPL-SCNC: 22 MMOL/L — SIGNIFICANT CHANGE UP (ref 22–31)
COCAINE METAB.OTHER UR-MCNC: NEGATIVE — SIGNIFICANT CHANGE UP
COLOR SPEC: ABNORMAL
CREAT SERPL-MCNC: 0.56 MG/DL — SIGNIFICANT CHANGE UP (ref 0.5–1.3)
CREATININE URINE RESULT, DAU: 25 MG/DL — SIGNIFICANT CHANGE UP
DACRYOCYTES BLD QL SMEAR: SLIGHT — SIGNIFICANT CHANGE UP
DIFF PNL FLD: ABNORMAL
EGFR: 118 ML/MIN/1.73M2 — SIGNIFICANT CHANGE UP
EGFR: 118 ML/MIN/1.73M2 — SIGNIFICANT CHANGE UP
EOSINOPHIL # BLD AUTO: 0 K/UL — SIGNIFICANT CHANGE UP (ref 0–0.5)
EOSINOPHIL NFR BLD AUTO: 0 % — SIGNIFICANT CHANGE UP (ref 0–6)
EPI CELLS # UR: PRESENT
ESTIMATED AVERAGE GLUCOSE: 97 — SIGNIFICANT CHANGE UP
ETHANOL SERPL-MCNC: <10 MG/DL — SIGNIFICANT CHANGE UP
FENTANYL UR QL SCN: NEGATIVE — SIGNIFICANT CHANGE UP
GIANT PLATELETS BLD QL SMEAR: PRESENT — SIGNIFICANT CHANGE UP
GLUCOSE SERPL-MCNC: 100 MG/DL — HIGH (ref 70–99)
GLUCOSE UR QL: NEGATIVE MG/DL — SIGNIFICANT CHANGE UP
HCG SERPL-ACNC: <1 MIU/ML — SIGNIFICANT CHANGE UP
HCT VFR BLD CALC: 34.3 % — LOW (ref 34.5–45)
HDLC SERPL-MCNC: 58 MG/DL — SIGNIFICANT CHANGE UP
HGB BLD-MCNC: 10.7 G/DL — LOW (ref 11.5–15.5)
IANC: 4.78 K/UL — SIGNIFICANT CHANGE UP (ref 1.8–7.4)
KETONES UR-MCNC: NEGATIVE MG/DL — SIGNIFICANT CHANGE UP
LEUKOCYTE ESTERASE UR-ACNC: ABNORMAL
LIPID PNL WITH DIRECT LDL SERPL: 74 MG/DL — SIGNIFICANT CHANGE UP
LYMPHOCYTES # BLD AUTO: 1.8 K/UL — SIGNIFICANT CHANGE UP (ref 1–3.3)
LYMPHOCYTES # BLD AUTO: 25.7 % — SIGNIFICANT CHANGE UP (ref 13–44)
MACROCYTES BLD QL: SLIGHT — SIGNIFICANT CHANGE UP
MANUAL SMEAR VERIFICATION: SIGNIFICANT CHANGE UP
MCHC RBC-ENTMCNC: 21.5 PG — LOW (ref 27–34)
MCHC RBC-ENTMCNC: 31.2 G/DL — LOW (ref 32–36)
MCV RBC AUTO: 68.9 FL — LOW (ref 80–100)
METHADONE UR-MCNC: NEGATIVE — SIGNIFICANT CHANGE UP
MICROCYTES BLD QL: SLIGHT — SIGNIFICANT CHANGE UP
MONOCYTES # BLD AUTO: 0.38 K/UL — SIGNIFICANT CHANGE UP (ref 0–0.9)
MONOCYTES NFR BLD AUTO: 5.5 % — SIGNIFICANT CHANGE UP (ref 2–14)
NEUTROPHILS # BLD AUTO: 3.78 K/UL — SIGNIFICANT CHANGE UP (ref 1.8–7.4)
NEUTROPHILS NFR BLD AUTO: 54.1 % — SIGNIFICANT CHANGE UP (ref 43–77)
NITRITE UR-MCNC: NEGATIVE — SIGNIFICANT CHANGE UP
NON HDL CHOLESTEROL: 84 MG/DL — SIGNIFICANT CHANGE UP
OPIATES UR-MCNC: NEGATIVE — SIGNIFICANT CHANGE UP
OVALOCYTES BLD QL SMEAR: SLIGHT — SIGNIFICANT CHANGE UP
OXYCODONE UR-MCNC: NEGATIVE — SIGNIFICANT CHANGE UP
PCP SPEC-MCNC: SIGNIFICANT CHANGE UP
PCP UR-MCNC: NEGATIVE — SIGNIFICANT CHANGE UP
PH UR: 8 — SIGNIFICANT CHANGE UP (ref 5–8)
PLAT MORPH BLD: NORMAL — SIGNIFICANT CHANGE UP
PLATELET # BLD AUTO: 179 K/UL — SIGNIFICANT CHANGE UP (ref 150–400)
PLATELET COUNT - ESTIMATE: NORMAL — SIGNIFICANT CHANGE UP
POIKILOCYTOSIS BLD QL AUTO: SLIGHT — SIGNIFICANT CHANGE UP
POLYCHROMASIA BLD QL SMEAR: SLIGHT — SIGNIFICANT CHANGE UP
POTASSIUM SERPL-MCNC: 4.3 MMOL/L — SIGNIFICANT CHANGE UP (ref 3.5–5.3)
POTASSIUM SERPL-SCNC: 4.3 MMOL/L — SIGNIFICANT CHANGE UP (ref 3.5–5.3)
PROT SERPL-MCNC: 7.1 G/DL — SIGNIFICANT CHANGE UP (ref 6–8.3)
PROT UR-MCNC: 30 MG/DL
RBC # BLD: 4.98 M/UL — SIGNIFICANT CHANGE UP (ref 3.8–5.2)
RBC # FLD: 15.1 % — HIGH (ref 10.3–14.5)
RBC BLD AUTO: ABNORMAL
RBC CASTS # UR COMP ASSIST: >50 /HPF — SIGNIFICANT CHANGE UP (ref 0–4)
SALICYLATES SERPL-MCNC: <0.3 MG/DL — LOW (ref 15–30)
SARS-COV-2 RNA SPEC QL NAA+PROBE: SIGNIFICANT CHANGE UP
SODIUM SERPL-SCNC: 139 MMOL/L — SIGNIFICANT CHANGE UP (ref 135–145)
SP GR SPEC: 1.01 — SIGNIFICANT CHANGE UP (ref 1–1.03)
T3 SERPL-MCNC: 166 NG/DL — SIGNIFICANT CHANGE UP (ref 80–200)
T4 AB SER-ACNC: 8.33 UG/DL — SIGNIFICANT CHANGE UP (ref 5.1–13)
TARGETS BLD QL SMEAR: SLIGHT — SIGNIFICANT CHANGE UP
THC UR QL: NEGATIVE — SIGNIFICANT CHANGE UP
TRIGL SERPL-MCNC: 46 MG/DL — SIGNIFICANT CHANGE UP
TSH SERPL-MCNC: <0.1 UIU/ML — LOW (ref 0.27–4.2)
UROBILINOGEN FLD QL: 0.2 MG/DL — SIGNIFICANT CHANGE UP (ref 0.2–1)
VALPROATE SERPL-MCNC: <3.15 UG/ML — LOW (ref 50–100)
VARIANT LYMPHS # BLD: 14.7 % — HIGH (ref 0–6)
VARIANT LYMPHS NFR BLD MANUAL: 14.7 % — HIGH (ref 0–6)
WBC # BLD: 6.99 K/UL — SIGNIFICANT CHANGE UP (ref 3.8–10.5)
WBC # FLD AUTO: 6.99 K/UL — SIGNIFICANT CHANGE UP (ref 3.8–10.5)
WBC UR QL: SIGNIFICANT CHANGE UP /HPF (ref 0–5)

## 2025-02-10 PROCEDURE — 99285 EMERGENCY DEPT VISIT HI MDM: CPT

## 2025-02-10 PROCEDURE — 93010 ELECTROCARDIOGRAM REPORT: CPT

## 2025-02-10 RX ORDER — NICOTINE POLACRILEX 4 MG/1
2 GUM, CHEWING ORAL EVERY 4 HOURS
Refills: 0 | Status: DISCONTINUED | OUTPATIENT
Start: 2025-02-11 | End: 2025-03-13

## 2025-02-10 RX ORDER — OLANZAPINE 10 MG/1
5 TABLET ORAL ONCE
Refills: 0 | Status: DISCONTINUED | OUTPATIENT
Start: 2025-02-11 | End: 2025-03-13

## 2025-02-10 RX ORDER — OLANZAPINE 10 MG/1
5 TABLET ORAL EVERY 6 HOURS
Refills: 0 | Status: DISCONTINUED | OUTPATIENT
Start: 2025-02-11 | End: 2025-03-13

## 2025-02-10 RX ORDER — HYDROXYZINE HYDROCHLORIDE 25 MG/1
25 TABLET, FILM COATED ORAL EVERY 6 HOURS
Refills: 0 | Status: DISCONTINUED | OUTPATIENT
Start: 2025-02-11 | End: 2025-03-13

## 2025-02-10 RX ORDER — OLANZAPINE 10 MG/1
10 TABLET ORAL AT BEDTIME
Refills: 0 | Status: DISCONTINUED | OUTPATIENT
Start: 2025-02-11 | End: 2025-02-19

## 2025-02-10 RX ORDER — MELATONIN 5 MG
3 TABLET ORAL AT BEDTIME
Refills: 0 | Status: DISCONTINUED | OUTPATIENT
Start: 2025-02-11 | End: 2025-03-13

## 2025-02-10 NOTE — ED BEHAVIORAL HEALTH ASSESSMENT NOTE - SUMMARY
39/F with reported hx of depression, anxiety and psychosis, chronically non adherent to meds; with multiple psych admissions; hx of prior OD on pills; with past hx of THC and AUD use.  pertinent medical issues:   Fe deficiency anemia, Other conduction disorders/ Nonrheumatic aortic valve disorders/ Other venous embolism and thrombosis, Sleep disorders (denied being on CPAP), and hx of hyperthyroidism.  Today, presented to the ED BIB EMS activated by her "counselor" from "[facility]" due to concern for SA (overdosed on tablets few days ago)     at this time, presents with worsening depression + anxiety + psychotic symptoms in the context of nonadherence to medications compounded by psychosocial stressors.  Pt is severely affectively dysregulated and remains unpredictable; she has been harboring death wish for sometime now which culminated in 2 recent ODs via ingestion of a dietary supplement vs maintenance medication obtained from [facility] as well as OD on her left over psych meds/ maintenance meds.  whilst she does acknowledge that she needs help, her judgement is poor and she remains unpredictable, i.e. poor impulse control.      differentials to entertain include: MDD with psychosis vs SAD, depressed; anxiety symptoms in the background of unresolved PTSD (of which, she was avoidant to discuss).      Pt is currently, not manifesting any manic episode; she does endorse symptoms of psychosis in the form of paranoia + VH (seeing shadows) + AH ("noises" - but I'm not sure vs cAH - "could be someone was telling me to take the pills").  current presentation however is more of feeling overwhelmed, depressed and anxious predominating  over psychosis symptoms.  Pt is unable to partake towards safety planning.      at this time, does not present as homicidal; is not intoxicated nor in withdrawal; does not appear delirious nor catatonic. ongoing presentation posing immediate threat to self.  and hence, will require psych admission aimed at stabilization and ensuring safety
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
## 2025-02-10 NOTE — ED BEHAVIORAL HEALTH NOTE - BEHAVIORAL HEALTH NOTE
As per provider, worker called [facility] ([phone]) madelin and she states that the patient is coming up in their data base.   Worker searched through chart and number for mother jess [phone] is not working. Pt does not know any collaterals. As per provider, worker called [facility] ([phone]) madelin and she states that the patient is coming up in their data base.   Worker searched through chart and number for mother jess [phone] is not working. Pt does not know any collaterals. worker called number listed in sunrise [phone] and left message.    Confirmed by psychiatry team that patient's children (20,16) are with each other. No concerns noted. As per provider, worker called [facility] ([phone]) madelin and she states that the patient is coming up in their data base.   Worker searched through chart and number for mother jess [phone] is not working. Pt does not know any collaterals. worker called number listed in sunrise [phone] and left message.    Confirmed by psychiatry team that patient's children (20,16) are with each other. No concerns noted.    addendum:  Worker printed [email or web address]  ems run sheet and found address for cja ([phone]) and [phone]; numbers is not working, no answer.

## 2025-02-10 NOTE — ED ADULT NURSE REASSESSMENT NOTE - NS ED NURSE REASSESS COMMENT FT1
3:30pm: Patient brought to  area from main ER. She stated that she had visual and auditory hallucinations. Patient is calm and cooperative at this time. Waiting for psychiatry.  VERNON Venegas
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
## 2025-02-10 NOTE — ED PROVIDER NOTE - CLINICAL SUMMARY MEDICAL DECISION MAKING FREE TEXT BOX
Fellow MD Per Rashid: 39-year-old female with past medical history of depression, anxiety presenting to the ED for evaluation of suicidal attempt 3 to 4 days ago.  As per patient, she states that 3 to 4 days ago she was feeling depressed and wanting to "end it all" and took her medications.  Patient is unable to name what medications she took.  States that she had a meeting with her counselor today where she endorses what had occurred and they sent her to the ED for evaluation.  Patient denies any current SI/HI, does endorse auditory hallucinations but denies visual hallucinations.  She denies chest pain, shortness of breath, cough, fever, chills, nausea, vomiting, diarrhea, abdominal pain, dysuria, hematuria, flank pain, headache, and any additional complaints at this time.  No recent travel or sick contacts.    On exam, the patient has pressured speech but is cooperative.  Constant observation order placed.  She is hypertensive, mildly tachycardic, afebrile, and satting 100% on room air.  Abdomen is soft and nontender.  Lungs are clear to auscultation bilaterally.  Patient denies any recent overdoses since the overdose 3 to 4 days ago.  On exam, there is no clonus, hyperreflexia, or rigidity.  Pupils are 3 mm and reactive bilaterally.  No flushing, diaphoresis, dryness, or nystagmus.  Neuroexam within normal limits.  Plan for labs, urinalysis, urine drug screen, coingestions, EKG, and psychiatry evaluation.

## 2025-02-10 NOTE — ED BEHAVIORAL HEALTH ASSESSMENT NOTE - AXIS III
hx of Fe deficiency anemia, Other conduction disorders/ Nonrheumatic aortic valve disorders/ Other venous embolism and thrombosis, Sleep disorders (denied being on CPAP), hx of hyperthyroidism

## 2025-02-10 NOTE — ED BEHAVIORAL HEALTH ASSESSMENT NOTE - HPI (INCLUDE ILLNESS QUALITY, SEVERITY, DURATION, TIMING, CONTEXT, MODIFYING FACTORS, ASSOCIATED SIGNS AND SYMPTOMS)
39yr old female, , domiciled and unemployed.  with reported hx of mood disorder, anxiety and psychosis; per PSYCKES, has multiple diagnoses to include: unspecified/Other Psychotic Disorders, Unspecified/Other Depressive Disorder, Adjustment Disorder, PTSD,  Schizophrenia, Delusional Disorder, Major Depressive Disorder, Other Mental Disorders, and Schizoaffective Disorder.   chronically non adherent to meds and psych appts; with multiple psych admissions + CPEP visits; last admission to [facility] in 8/2024 for mgt of psychosis in the context of nonadherence to medications;  with reported substance use hx to include: Tobacco related disorder,  Alcohol related disorders,  and Cannabis related disorders.  pertinent medical issues (per PSYCKES) include:  Fe deficiency anemia, Other conduction disorders/ Nonrheumatic aortic valve disorders/ Other venous embolism and thrombosis, Sleep disorders (denied being on CPAP), AND hx of Thyrotoxicosis [hyperthyroidism].  per chart review, prior documented hx of " a suicide attempt via overdose on pills about a year ago" (2023).   Today, presented to the ED BIB EMS activated by her "counselor" from "[facility]" due to concern for SA (overdosed on tablets few days ago)       ** see separate [facility] note for detailed attempts to obtain collateral information for this Pt ** 39yr old female, , domiciled and unemployed.  with reported hx of mood disorder, anxiety and psychosis; per PSYCKES, has multiple diagnoses to include: unspecified/Other Psychotic Disorders, Unspecified/Other Depressive Disorder, Adjustment Disorder, PTSD,  Schizophrenia, Delusional Disorder, Major Depressive Disorder, Other Mental Disorders, and Schizoaffective Disorder.   chronically non adherent to meds and psych appts; with multiple psych admissions + CPEP visits; last admission to [facility] in 8/2024 for mgt of psychosis in the context of nonadherence to medications;  with reported substance use hx to include: Tobacco related disorder,  Alcohol related disorders,  and Cannabis related disorders.  pertinent medical issues (per PSYCKES) include:  Fe deficiency anemia, Other conduction disorders/ Nonrheumatic aortic valve disorders/ Other venous embolism and thrombosis, Sleep disorders (denied being on CPAP), AND hx of Thyrotoxicosis [hyperthyroidism].  per chart review, prior documented hx of " a suicide attempt via overdose on pills about a year ago" (2023).   Today, presented to the ED BIB EMS activated by her "counselor" from "[facility]" due to concern for SA (overdosed on tablets few days ago)     seen bedside.  calm and cooperative.  not internally preoccupied nor manifesting any signs/ symptoms of severe psychosis apart from endorsing that she has been intermittently experiencing VH described as seeing shadows as well as experiencing AH - described as "noises"; also endorses vague cAH: "could be that someone was telling me to take the pills" - in reference to her recent overdoses of various pills taken twice with aim of dying.  "I tried to kill myself", she tells writer.  apart from the perceptual disturbances she had intermittently been experiencing prior, also endorses that at times, she would feel anxious - triggered by paranoia:" felt being attacked by somebody".  denied that psychotic symptoms are attributable to illicit substance use.  Pt denied using any illicit substances including alcohol.  no thought insertion/ withdrawal/ broadcasting    predominating symptoms more of depression + anxiety rather than psychotic symptoms.  does endorses previously experiencing "mood swings" but nothing suggestive that she has recently or in the past, experienced any signs/ symptoms suggestive of sweta/ hypomania.  last euthymic episode attained back in october - november 2023.  since then, claimed that she has been progressively depressed and anxious. recently, claimed more sad and anxious, feeling overwhelmed.  associated symptoms include: poor sleep, eating disturbances (sometimes overeating, sometimes poor oral intake), impaired concentration and low energy level.  she has also been feeling hopeless/ helpless/ worthless. admits to current passive SI without any intent and plans.  denied any HI.      1-2 weeks ago, reported that she felt overwhelmed with "things" but refused to elaborate what these stressors were - "I don't want to talk about it", she says.  admitted to impulsively overdosing on 27 tabs of various pills she had in her possessions - prescribed meds from her last [facility] admission (2024) with intention to die. initially, felt weird and "crazy" - did not seek medical consult.  then a few days ago, claimed that she had another overdose on "nepotean" x 31 tabs with again, intentions to die.  she currently, is not expressing any remorse. rather tells writer that "I felt I cheated death".  continues to currently harbor passive SI.  no HI.  no other plans/ researches conducted on means to consummate on the suicidal thoughts.      along with the depressive episode, also endorses feeling anxious - manifesting as panic attacks.  Pt admits that she is not in psych care.      today, went to her counsellor at [facility] in [facility].  told her counsellor that she had overdosed on tablets.  counsellor activated 911    ** see separate [facility] note for detailed attempts to obtain collateral information for this Pt **

## 2025-02-10 NOTE — ED BEHAVIORAL HEALTH ASSESSMENT NOTE - NSPRESENTSXS_PSY_ALL_CORE
? claims that "could be someone was telling me to take the pills"/Depressed mood/Anhedonia/Psychosis/Impulsivity/Hopelessness or despair/Global insomnia/Severe anxiety, agitation or panic/Refusal or inability to complete safety plan

## 2025-02-10 NOTE — ED ADULT TRIAGE NOTE - CHIEF COMPLAINT QUOTE
pt hx of schizophrenia, anxiety and depression, states she took all her psych medications at one time in an attempt to end her life. states this occurred 5 days. pt is not sure of the name of the medications and does not have the medication bottles with her. pt denies auditory or visual hallucinations. denies HI.

## 2025-02-10 NOTE — ED BEHAVIORAL HEALTH ASSESSMENT NOTE - NSACTIVEVENT_PSY_ALL_CORE
Triggering events leading to humiliation, shame, and/or despair (e.g., Loss of relationship, financial or health status) (real or anticipated)/Legal problems/Inadequate social supports

## 2025-02-10 NOTE — ED BEHAVIORAL HEALTH ASSESSMENT NOTE - PSYCHIATRIC ISSUES AND PLAN (INCLUDE STANDING AND PRN MEDICATION)
reinitiate: zyprexa 10mg HS. titrate.  PRNs: zyprexa 5mg PO/SL/ IM q6hrs for psychotic agitation.  PRN: atarax 25mg PO q6Hrs for anxiety/ sleep disturbances.  melatonin 3mg HS

## 2025-02-10 NOTE — ED BEHAVIORAL HEALTH ASSESSMENT NOTE - NSBHMSEPERCEPT_PSY_A_CORE
vague AH: "could be someone telling me to take me to take the pills"; occasionally, derogatory remarks: "you fuk..ing whore"

## 2025-02-10 NOTE — ED PROVIDER NOTE - ATTENDING CONTRIBUTION TO CARE
I have personally performed a face to face medical and diagnostic evaluation of the patient. I have discussed with and reviewed the Resident's note and agree with the History, ROS, Physical Exam and MDM unless otherwise indicated. A brief summary of my personal evaluation and impression can be found below.    Jett ANNE: 39F hx of anxiety, depression, presents with a cc of c/f SI pt reports she decided 4-5 days ago to take all of her psych meds, though she could not elaborate on which specific meds she took in an attempt to harm herself, told this to her therapist today was told to come to ED for eval. Denies SI HI AH VH at this time, just notes very mild diffuse abdominal discomfort, otherwise has no affected. Is noted to be tangental and pressured.     All other ROS negative, except as above and as per HPI and ROS section.    VITALS: Initial triage and subsequent vitals have been reviewed by me.  GEN APPEARANCE: Alert, non-toxic, well-appearing, unkempt,   HEAD: Atraumatic.  EYES: PERRLa, EOMI, vision grossly intact.   NECK: Supple  CV: RRR, S1S2, no c/r/m/g. Cap refill <2 seconds. No bruits.   LUNGS: CTAB. No abnormal breath sounds.  ABDOMEN: Soft, NTND. No guarding or rebound.   MSK/EXT: No spinal or extremity point tenderness. No CVA ttp. Pelvis stable. No peripheral edema.  NEURO: Alert, follows commands. Weight bearing normal. Speech normal. Sensation and motor normal x4 extremities.   SKIN: Warm, dry and intact. No rash.  PSYCH: disorganized, tangental, pressured speech     Plan/MDM:  exam vss non toxic w PE as above ddx c/f likely SI attempt - unclear what meds she took however if ingestion was now 4-5 days ago she has likely metabolized whatever was ingested, will get ekg, check psych labs, re eval pending study results and any concerning metabolic derangements, once medically cleared will discuss w BH, likely admit.

## 2025-02-10 NOTE — ED ADULT NURSE NOTE - OBJECTIVE STATEMENT
This is a 40 y/o female alert oriented x 2. Unable ot obtain past medical history. Presented with overdose, patient reports she took some pills, to hurt herself, but now she feels safe here in the hospital. No eye contact, admits to hearing voices, and seeing things, patient observed to be laughing on her own, cooperative and calm at the moment.

## 2025-02-10 NOTE — ED BEHAVIORAL HEALTH ASSESSMENT NOTE - DESCRIPTION
domiciled with 2 children (now ages 20 and 16 - reports that they are safe at home; 16 yr old going to school); Pt is ;  does not live with herself and their sons.  siblings: 3 brothers + 1 sister - all domiciled in [facility].  when euthymic: likes dancing, socializing, learning new things, nature/ plants.  no pets; no reported access to guns. 7th day Islam. initially seen at the main ED. medically deemed stable and subsequently transferred to  ED for further evaluation and management.  Since her  ED arrival, she has calm, cooperative.  There has been no occurrence of agitation/aggressive behavior.  No current verbalization of active SI/HI.   There are no signs/symptoms of acute sweta or florid psychosis (despite endorsing experiencing "AH"; not internally preoccupied; not disorganized).  Pt is not showing any signs/symptoms of intoxication or withdrawal.  she is not delirious nor catatonic .. she has not tested limits.. Has maintained appropriate boundaries. Pt has been easily redirected.  Overall, there has been no management issues. no PRN meds given       Vital Signs Last 24 Hrs  T(C): 36.7 (10 Feb 2025 14:37), Max: 36.8 (10 Feb 2025 11:46)  T(F): 98.1 (10 Feb 2025 14:37), Max: 98.2 (10 Feb 2025 11:46)  HR: 78 (10 Feb 2025 14:37) (78 - 108)  BP: 112/59 (10 Feb 2025 14:37) (112/59 - 157/69)  BP(mean): --  RR: 18 (10 Feb 2025 14:37) (18 - 18)  SpO2: 100% (10 Feb 2025 14:37) (100% - 100%)    Parameters below as of 10 Feb 2025 14:37  Patient On (Oxygen Delivery Method): room air per PSYCKES: has hx of Fe deficiency anemia, Other conduction disorders/ Nonrheumatic aortic valve disorders/ Other venous embolism and thrombosis, Sleep disorders (denied being on CPAP), AND hx of Thyrotoxicosis [hyperthyroidism].

## 2025-02-10 NOTE — ED PROVIDER NOTE - PHYSICAL EXAMINATION
Constitutional: Well-appearing, well-nourished, comfortable appearing.   Head: Normocephalic, atraumatic.   Eyes: PERRL. EOMI. No conjunctival pallor.   ENT: Moist mucous membranes. Uvula midline. No pharyngeal erythema or exudates.  Neck: No LAD. Supple.  CVS: Regular rate, regular rhythm. Normal S1, S2. No murmurs, rubs, or gallops. No peripheral edema noted.   Respiratory: No respiratory distress. Clear to auscultation bilaterally. No wheezing, rales, or rhonchi.   Abdomen: Abd is soft and non-distended. No tenderness. No rebound, guarding, or rigidity.   MSK: No CVA tenderness bilaterally.   Neuro: Alert and oriented to person, place, and time. Follows commands. Moves all extremities. No clonus, hyperreflexia, or rigidity. No nystagmus. Pupils 3 mm and reactive bilaterally.   Skin: Warm and dry. No rashes. No diaphoresis or flushing.   Psych: Pressure speech. Anxious affect, cooperative.
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
## 2025-02-10 NOTE — ED PROVIDER NOTE - PROGRESS NOTE DETAILS
Fellow MD Per Rashid: Pt is medically cleared for psychiatry evaluation. Fellow MD Per Rashid: Discussed case with psychiatry team who will evaluate the patient. Arielle Saul PA: pt signed out to me, hx of anxiety, depression BIBEMS for suicide attempt. took all of her medications at once a few days ago. pt medically cleared in psych ED. T3/T4 added on due to low TSH. no sx's of autonomic dysfunction, TFT's can be followed up at [facility].

## 2025-02-10 NOTE — ED BEHAVIORAL HEALTH ASSESSMENT NOTE - NSBHROSSYSTEMS_PSY_ALL_CORE
Pt denied any headaches, no dizziness, no blurring of vision; no sorethroat; no cough, no fever. no chest pains, no SOB, no palpitations, no abdominal pains, no nausea/ vomiting/ diarrhea, no dysuria, no hesitancy, no arthralgia/ no pruritus. denied any muscle/ joint pains/Psychiatric

## 2025-02-10 NOTE — ED ADULT NURSE NOTE - NSFALLUNIVINTERV_ED_ALL_ED
Bed/Stretcher in lowest position, wheels locked, appropriate side rails in place/Call bell, personal items and telephone in reach/Instruct patient to call for assistance before getting out of bed/chair/stretcher/Non-slip footwear applied when patient is off stretcher/[place] to call system/Physically safe environment - no spills, clutter or unnecessary equipment/Purposeful proactive rounding/Room/bathroom lighting operational, light cord in reach

## 2025-02-10 NOTE — ED BEHAVIORAL HEALTH ASSESSMENT NOTE - NSSUICPROTFACT_PSY_ALL_CORE
Responsibility to children, family, or others/Positive therapeutic relationships/Scientology beliefs

## 2025-02-10 NOTE — ED BEHAVIORAL HEALTH ASSESSMENT NOTE - ADDITIONAL DETAILS ALL
prior OD in 2023; then twice attempted suicide via overdosing on various pills (left overs from her last [facility] admission + dietary vs maintenance meds obtained from [place] [place])

## 2025-02-10 NOTE — ED BEHAVIORAL HEALTH ASSESSMENT NOTE - VIOLENCE RISK FACTORS:
Affective dysregulation/Impulsivity/History of being victimized/traumatized/Noncompliance with treatment/Community stressors that increase the risk of destabilization

## 2025-02-11 PROCEDURE — 99222 1ST HOSP IP/OBS MODERATE 55: CPT

## 2025-02-11 RX ORDER — MELATONIN 5 MG
5 TABLET ORAL ONCE
Refills: 0 | Status: COMPLETED | OUTPATIENT
Start: 2025-02-11 | End: 2025-02-11

## 2025-02-11 RX ADMIN — OLANZAPINE 5 MILLIGRAM(S): 10 TABLET ORAL at 08:58

## 2025-02-11 RX ADMIN — Medication 5 MILLIGRAM(S): at 02:48

## 2025-02-11 RX ADMIN — Medication 3 MILLIGRAM(S): at 20:35

## 2025-02-11 RX ADMIN — OLANZAPINE 10 MILLIGRAM(S): 10 TABLET ORAL at 20:35

## 2025-02-11 RX ADMIN — NICOTINE POLACRILEX 2 MILLIGRAM(S): 4 GUM, CHEWING ORAL at 08:59

## 2025-02-11 NOTE — BH INPATIENT PSYCHIATRY ASSESSMENT NOTE - NSBHCHARTREVIEWVS_PSY_A_CORE FT
Vital Signs Last 24 Hrs  T(C): 36.9 (02-11-25 @ 06:55), Max: 36.9 (02-11-25 @ 06:55)  T(F): 98.5 (02-11-25 @ 06:55), Max: 98.5 (02-11-25 @ 06:55)  HR: 74 (02-11-25 @ 01:59) (74 - 85)  BP: 122/64 (02-11-25 @ 01:59) (112/59 - 123/65)  BP(mean): --  RR: 16 (02-11-25 @ 01:59) (16 - 18)  SpO2: 100% (02-11-25 @ 01:59) (100% - 100%)    Orthostatic VS  02-11-25 @ 06:55  Lying BP: --/-- HR: --  Sitting BP: 122/53 HR: 76  Standing BP: 126/63 HR: 78  Site: --  Mode: --   Vital Signs Last 24 Hrs  T(C): 36.9 (02-11-25 @ 06:55), Max: 36.9 (02-11-25 @ 06:55)  T(F): 98.5 (02-11-25 @ 06:55), Max: 98.5 (02-11-25 @ 06:55)  HR: 74 (02-11-25 @ 01:59) (74 - 85)  BP: 122/64 (02-11-25 @ 01:59) (122/64 - 123/65)  BP(mean): --  RR: 16 (02-11-25 @ 01:59) (16 - 18)  SpO2: 100% (02-11-25 @ 01:59) (100% - 100%)    Orthostatic VS  02-11-25 @ 06:55  Lying BP: --/-- HR: --  Sitting BP: 122/53 HR: 76  Standing BP: 126/63 HR: 78  Site: --  Mode: --
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
## 2025-02-11 NOTE — BH INPATIENT PSYCHIATRY ASSESSMENT NOTE - NSBHATTESTCOMMENTATTENDFT_PSY_A_CORE
Patient presents after suicide attempt by OD on unknown pills she identifies as nepotean.  She states since she did not die, she will not attempt suicide again.  She has markedly disorganized and tangential thought process but good enough insight to know she wishes to be back on medication and to take HUGHES.

## 2025-02-11 NOTE — BH INPATIENT PSYCHIATRY ASSESSMENT NOTE - NSBHASSESSSUMMFT_PSY_ALL_CORE
39/F with reported hx of depression, anxiety and psychosis, chronically non adherent to meds with multiple psych admissions, per [facility], has multiple diagnoses to include: unspecified/Other Psychotic Disorders, Unspecified/Other Depressive Disorder, Adjustment Disorder, PTSD,  Schizophrenia, Delusional Disorder, Major Depressive Disorder, Other Mental Disorders, and Schizoaffective Disorder, presented to the ED BIB EMS activated by her "counselor" from "[facility]" due to concern for SA (overdosed on tablets few days ago).    Pt does not have active SI at this time, will explore possibility of long-acting injectable medication with the pt to improve treatment adherence.     Plan:  olanzapine 10mg qhs  melatonin 3mg qhs    PRNs:  atarax 25mg q6 for anxiety  olanzapine 10mg q6 for agitation 39/F, unemployed, , lives in nearby apartment with 2 sons (16 and 20), with reported hx of depression, anxiety and psychosis, chronically non adherent to meds with multiple psych admissions, per [facility], has multiple diagnoses to include: unspecified/Other Psychotic Disorders, Unspecified/Other Depressive Disorder, Adjustment Disorder, PTSD,  Schizophrenia, Delusional Disorder, Major Depressive Disorder, Other Mental Disorders, and Schizoaffective Disorder, presented to the ED BIB EMS activated by her "counselor" from "[facility]" due to concern for SA (overdosed on tablets few days ago).    Pt does not have active SI at this time, will explore possibility of long-acting injectable medication with the pt to improve treatment adherence.     Plan:  olanzapine 10mg qhs  melatonin 3mg qhs    PRNs:  atarax 25mg q6 for anxiety  olanzapine 10mg q6 for agitation 39/F, unemployed, , lives in nearby apartment with 2 sons (16 and 20), with reported hx of depression, anxiety and psychosis, chronically non adherent to meds with multiple psych admissions, per [facility], has multiple diagnoses to include: unspecified/Other Psychotic Disorders, Unspecified/Other Depressive Disorder, Adjustment Disorder, PTSD,  Schizophrenia, Delusional Disorder, Major Depressive Disorder, Other Mental Disorders, and Schizoaffective Disorder, presented to the ED BIB EMS activated by her "counselor" from "[facility]" due to concern for SA (overdosed on tablets few days ago).    Pt does not have active SI at this time, will explore possibility of long-acting injectable medication with the pt to improve treatment adherence.     Plan:  Admitted involuntary status on 1N, no CO needed, routine checks  olanzapine 10mg qhs  melatonin 3mg qhs    PRNs:  atarax 25mg q6 for anxiety  olanzapine 10mg q6 for agitation

## 2025-02-11 NOTE — BH INPATIENT PSYCHIATRY ASSESSMENT NOTE - NSBHMETABOLIC_PSY_ALL_CORE_FT
BMI: BMI (kg/m2): 22.3 (07-12-24 @ 16:04)  HbA1c: A1C with Estimated Average Glucose Result: 5.0 % (02-10-25 @ 12:53)    Glucose:   BP: 122/64 (02-11-25 @ 01:59) (112/59 - 157/69)Vital Signs Last 24 Hrs  T(C): 36.9 (02-11-25 @ 06:55), Max: 36.9 (02-11-25 @ 06:55)  T(F): 98.5 (02-11-25 @ 06:55), Max: 98.5 (02-11-25 @ 06:55)  HR: 74 (02-11-25 @ 01:59) (74 - 85)  BP: 122/64 (02-11-25 @ 01:59) (112/59 - 123/65)  BP(mean): --  RR: 16 (02-11-25 @ 01:59) (16 - 18)  SpO2: 100% (02-11-25 @ 01:59) (100% - 100%)    Orthostatic VS  02-11-25 @ 06:55  Lying BP: --/-- HR: --  Sitting BP: 122/53 HR: 76  Standing BP: 126/63 HR: 78  Site: --  Mode: --    Lipid Panel: Date/Time: 02-10-25 @ 12:53  Cholesterol, Serum: 142  LDL Cholesterol Calculated: 74  HDL Cholesterol, Serum: 58  Total Cholesterol/HDL Ration Measurement: --  Triglycerides, Serum: 46   BMI: BMI (kg/m2): 22.3 (07-12-24 @ 16:04)  HbA1c: A1C with Estimated Average Glucose Result: 5.0 % (02-10-25 @ 12:53)    Glucose:   BP: 122/64 (02-11-25 @ 01:59) (112/59 - 157/69)Vital Signs Last 24 Hrs  T(C): 36.9 (02-11-25 @ 06:55), Max: 36.9 (02-11-25 @ 06:55)  T(F): 98.5 (02-11-25 @ 06:55), Max: 98.5 (02-11-25 @ 06:55)  HR: 74 (02-11-25 @ 01:59) (74 - 85)  BP: 122/64 (02-11-25 @ 01:59) (122/64 - 123/65)  BP(mean): --  RR: 16 (02-11-25 @ 01:59) (16 - 18)  SpO2: 100% (02-11-25 @ 01:59) (100% - 100%)    Orthostatic VS  02-11-25 @ 06:55  Lying BP: --/-- HR: --  Sitting BP: 122/53 HR: 76  Standing BP: 126/63 HR: 78  Site: --  Mode: --    Lipid Panel: Date/Time: 02-10-25 @ 12:53  Cholesterol, Serum: 142  LDL Cholesterol Calculated: 74  HDL Cholesterol, Serum: 58  Total Cholesterol/HDL Ration Measurement: --  Triglycerides, Serum: 46

## 2025-02-11 NOTE — BH INPATIENT PSYCHIATRY ASSESSMENT NOTE - NSBHSATHC_PSY_A_CORE FT
Reports history of frequent marijuana use (5-7) spliffs per day, but also stopped in 2022 following the death of her father. Currently smokes marijuana less than once a month.

## 2025-02-11 NOTE — BH INPATIENT PSYCHIATRY ASSESSMENT NOTE - DESCRIPTION
domiciled with 2 children (now ages 20 and 16 - reports that they are safe at home; 16 yr old going to school); Pt is ;  does not live with herself and their sons.  siblings: 3 brothers + 1 sister - all domiciled in [facility].  when euthymic: likes dancing, socializing, learning new things, nature/ plants.  no pets; no reported access to guns. 7th day [employer].
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
## 2025-02-11 NOTE — BH INPATIENT PSYCHIATRY ASSESSMENT NOTE - NSBHSAALC_PSY_A_CORE FT
Reports history of frequent alcohol use (few beers per day, every day), but stopped drinking frequently following the death of her father in 2022. Reports that she now has "maybe one beer a month."

## 2025-02-11 NOTE — BH INPATIENT PSYCHIATRY ASSESSMENT NOTE - ADDITIONAL DETAILS ALL
prior OD in 2023; then twice attempted suicide via overdosing on various pills (left overs from her last [facility] admission + dietary vs maintenance meds obtained from [place] [place])

## 2025-02-11 NOTE — BH SOCIAL WORK INITIAL PSYCHOSOCIAL EVALUATION - INSURANCE HELP
Physical Therapy Daily Treatment Note   Date:  2019    TIme In:      825                Time Out:      56    Patient Name:  Alcira Rivera    :  1948  MRN: [id number]    Restrictions/Precautions:    Pertinent Medical History:  Medical/Treatment Diagnosis Information:  ·   Recent falls; balance disorder     Insurance/Certification information:     Medicare, [facility]  Physician Information:    Terrie Hussein APRN  Plan of care signed (Y/N):    Visit# / total visits:     8/    G-Code (if applicable):      Date / Visit # G-Code Applied:         Progress Note: []  Yes  [x]  No  Next due by: Visit #10      Pain level:   0/10    Subjective: Pt reports: feels a little better; no new c/o. Objective:  Observation:   Test measurements:    Palpation:    Exercises:  Exercise Resistance/Repetitions Other comments   Nustep 10' L5 22   Standing marching w/ support 20\" x 5 22   Sit to stand 5 x 5  22   Side stepping with support 1' x 5 22   Standing heel-toe raises 2x15 22   Seated trunk AROM: a/p, lat 3 x 10 each 22   SLS w/ support 15\" x 5 22   Seated cervical AROM: horizontal/vertical 3 x 10 each 22   Seated cervical AROM: horizontal/vertical with eyes fixed on dot (VOR) 3 x 10 each 22                    Other Therapeutic Activities:      Manual Treatments:      Modalities:        Timed Code Treatment Minutes: 40 and rest grouped       Total Treatment Minutes:  55    Treatment/Activity Tolerance:  [x] Patient tolerated treatment well [] Patient limited by fatigue  [] Patient limited by pain  [] Patient limited by other medical complications  [x] Other:  Pt required occasional rest breaks.     Pain after treatment:      0/10    Prognosis: [x] Good [] Fair  [] Poor    Patient Requires Follow-up: [x] Yes  [] No    Plan:   [x] Continue per plan of care [] Alter current plan (see comments)  [] Plan of care initiated [] Hold pending MD visit [] Discharge    Plan for Next Session:        Electronically no

## 2025-02-11 NOTE — BH INPATIENT PSYCHIATRY ASSESSMENT NOTE - NSBHMSEPERCEPT_PSY_A_CORE
vague AH: "could be someone telling me to take me to take the pills"; occasionally, derogatory remarks: "you fuk..ing whore"/Auditory hallucinations
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
## 2025-02-11 NOTE — BH PATIENT PROFILE - FUNCTIONAL ASSESSMENT - BASIC MOBILITY 6.
[Normal] : no acute distress, well nourished, well developed and well-appearing 4 = No assist / stand by assistance
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
## 2025-02-11 NOTE — BH SOCIAL WORK INITIAL PSYCHOSOCIAL EVALUATION - NSBHBARRIERS_PSY_ALL_CORE
Co-morbid personality/Lack of support/Medical co-morbidities/Non-compliant with treatment/Poor judgement

## 2025-02-11 NOTE — BH INPATIENT PSYCHIATRY ASSESSMENT NOTE - OTHER PAST PSYCHIATRIC HISTORY (INCLUDE DETAILS REGARDING ONSET, COURSE OF ILLNESS, INPATIENT/OUTPATIENT TREATMENT)
chronically non adherent with multiple psych admissions including last discharged from [facility] 2W on 8/7/2024 for mgt of Psychosis, Anemia, Heart murmur, Hyperthyroidism, Hx of AUD and cannabis abuse  = was previously admitted to [facility] as Pt was in custody for assault; at that time, Psych ED service was consulted for evaluation of depression and AH  = at 2W, the Pt remained oddly related, in better behavioral control; VPA level was at 49.5, 7/30/2024 (52.6 on 7/23/2024) despite being on Higher VPA dose  = meds during her last [facility] admission were: artificial tears (preservative free) Ophthalmic Solution 1 Drop(s) BE daily, ASA 81mg daily,  Zyprexa 10mg daily, Zyprexa 20mg HS  , clonazepam 0.5mg HS, VPA ER 1000mg HS, FeSO4 325mg daily, folic acid 1mg daily, melatonin 3mg HS, methimazole 5mg daily, MVI 1 Tab daily,  nicotine 7mg/24hrs Transdermal daily    prior out-Pt psych treatment at [facility]  since after she was discharged from [facility] in 8/2024, Pt claimed she has not been in psych care since    with prior hx of SA via OD on pills  denied engaging in any NSSIB chronically non adherent with multiple psych admissions including last discharged from [facility] 2W on 8/7/2024 for mgt of Psychosis, Anemia, Heart murmur, Hyperthyroidism, Hx of AUD and cannabis abuse  = was previously admitted to [facility] in June 2024 as Pt was in custody for assault; at that time, Psych ED service was consulted for evaluation of depression and AH  = at 2W, the Pt remained oddly related, in better behavioral control; VPA level was at 49.5, 7/30/2024 (52.6 on 7/23/2024) despite being on Higher VPA dose  = meds during her last [facility] admission were: artificial tears (preservative free) Ophthalmic Solution 1 Drop(s) BE daily, ASA 81mg daily,  Zyprexa 10mg daily, Zyprexa 20mg HS  , clonazepam 0.5mg HS, VPA ER 1000mg HS, FeSO4 325mg daily, folic acid 1mg daily, melatonin 3mg HS, methimazole 5mg daily, MVI 1 Tab daily,  nicotine 7mg/24hrs Transdermal daily    prior out-Pt psych treatment at [facility]  since discharge from [facility] in 8/2024, Pt claimed she has not been in psych care since    with prior hx of SA via OD on pills  denied engaging in any NSSIB

## 2025-02-11 NOTE — BH PATIENT PROFILE - HOME MEDICATIONS
folic acid 1 mg oral tablet , 1 tab(s) orally once a day  Multiple Vitamins oral tablet , 1 tab(s) orally once a day  nicotine 7 mg/24 hr transdermal film, extended release , 1 patch transdermal once a day  ocular lubricant ophthalmic solution , 1 drop(s) to each affected eye once a day  melatonin 3 mg oral tablet , 1 tab(s) orally once a day (at bedtime)  ferrous sulfate 325 mg (65 mg elemental iron) oral tablet , 1 tab(s) orally once a day  clonazePAM 0.5 mg oral tablet , 1 tab(s) orally once a day (at bedtime) MDD: 0.5 mg  methIMAzole 5 mg oral tablet , 1 tab(s) orally once a day  OLANZapine 20 mg oral tablet , 1 tab(s) orally once a day (at bedtime)  OLANZapine 10 mg oral tablet , 1 tab(s) orally once a day  aspirin 81 mg oral delayed release tablet , 1 tab(s) orally once a day  Depakote  mg oral tablet, extended release , 2 tab(s) orally once a day (at bedtime)   ZyPREXA Relprevv 405 mg intramuscular injection, extended release , 405 milligram(s) intramuscularly every 28 days  nicotine 2 mg oral transmucosal gum , 1 gum by transmucosal administration 4 times a day  melatonin 3 mg oral tablet , 1 tab(s) orally once a day (at bedtime) as needed for  insomnia  OLANZapine 20 mg oral tablet , 1 tab(s) orally once a day (at bedtime)

## 2025-02-11 NOTE — BH INPATIENT PSYCHIATRY ASSESSMENT NOTE - CURRENT MEDICATION
MEDICATIONS  (STANDING):  melatonin 3 milliGRAM(s) Oral at bedtime  OLANZapine 10 milliGRAM(s) Oral at bedtime    MEDICATIONS  (PRN):  hydrOXYzine hydrochloride 25 milliGRAM(s) Oral every 6 hours PRN anxiety/ sleep disturbances  nicotine  Polacrilex Gum 2 milliGRAM(s) Oral every 4 hours PRN Smoking Cessation  OLANZapine Disintegrating Tablet 5 milliGRAM(s) Oral every 6 hours PRN psychotic agitation  OLANZapine Injectable 5 milliGRAM(s) IntraMuscular Once PRN severe psychotic agitation

## 2025-02-11 NOTE — BH INPATIENT PSYCHIATRY ASSESSMENT NOTE - RISK ASSESSMENT
Risk factors: +current SI: attempted to overdose on pills twice in the past week, h/o SA/SIB (overdose on pills), h/o psych admissions, noncompliant with treatment, not receiving treatment    Protective factors: no access to weapons, no active substance abuse, good physical health, dependent children, spirituality, domiciled, help-seeking behaviors, future-orientation    Overall, pt is at high acute risk of harm and meets criteria for psychiatric admission for safety and stabilization.

## 2025-02-11 NOTE — BH INPATIENT PSYCHIATRY ASSESSMENT NOTE - NSBHCONSBHPROVCNTCTNOFT_PSY_A_CORE
patient cannot recall name or contact info--stated she thought she had treatment here but there is no outpatient record for her

## 2025-02-11 NOTE — BH PATIENT PROFILE - FALL HARM RISK - UNIVERSAL INTERVENTIONS
Bed in lowest position, wheels locked, appropriate side rails in place/Call bell, personal items and telephone in reach/Instruct patient to call for assistance before getting out of bed or chair/Non-slip footwear when patient is out of bed/[place] to call system/Physically safe environment - no spills, clutter or unnecessary equipment/Purposeful Proactive Rounding/Room/bathroom lighting operational, light cord in reach

## 2025-02-12 PROCEDURE — 99231 SBSQ HOSP IP/OBS SF/LOW 25: CPT

## 2025-02-12 RX ADMIN — HYDROXYZINE HYDROCHLORIDE 25 MILLIGRAM(S): 25 TABLET, FILM COATED ORAL at 23:33

## 2025-02-12 RX ADMIN — Medication 3 MILLIGRAM(S): at 20:07

## 2025-02-12 RX ADMIN — OLANZAPINE 5 MILLIGRAM(S): 10 TABLET ORAL at 23:33

## 2025-02-12 RX ADMIN — OLANZAPINE 10 MILLIGRAM(S): 10 TABLET ORAL at 20:06

## 2025-02-12 NOTE — BH INPATIENT PSYCHIATRY PROGRESS NOTE - NSBHASSESSSUMMFT_PSY_ALL_CORE
39/F, unemployed, , lives in nearby apartment with 2 sons (16 and 20), with reported hx of depression, anxiety and psychosis, chronically non adherent to meds with multiple psych admissions, per PSYCKES, has multiple diagnoses to include: unspecified/Other Psychotic Disorders, Unspecified/Other Depressive Disorder, Adjustment Disorder, PTSD,  Schizophrenia, Delusional Disorder, Major Depressive Disorder, Other Mental Disorders, and Schizoaffective Disorder, presented to the ED BIB EMS activated by her "counselor" from "[facility]" due to concern for SA (overdosed on tablets few days ago).    Pt does not have active SI at this time, will explore possibility of long-acting injectable medication with the pt to improve treatment adherence.     Plan:  Admitted involuntary status on 1N, no CO needed, routine checks  olanzapine 10mg qhs  melatonin 3mg qhs    PRNs:  atarax 25mg q6 for anxiety  olanzapine 10mg q6 for agitation

## 2025-02-12 NOTE — BH INPATIENT PSYCHIATRY PROGRESS NOTE - MSE UNSTRUCTURED FT
Appearance: patient appeared stated age wearing two hospital gowns, fair hygiene and poor grooming  Behavior: superficially cooperative, left interview to grab water  Eye contact: fair  Speech: Normal volume and rate with decreased quantity, mild speech latency present  Mood: "okay"  Affect: blunted with constricted range, congruent with mood  Thought Process: linear and goal directed  Thought Content: normal, no delusions, preoccupations, ruminations, or ideas of reference  Perception: reports AH, hearing voices that say "die," no visual hallucinations or other perceptual disturbances.   Insight: fair  Judgement: fair  Impulse Control: poor  Gait: normal Appearance: patient appeared stated age wearing a hospital gown, poor hygiene and poor grooming  Behavior: cooperative throughout interview  Eye contact: fair  Speech: Normal volume, increased rate and quantity (but not as much as yesterday), no pressured speech  Mood: "fine"  Affect: restricted affect, constrictive range, congruent with mood  Thought Process: disorganized, tangential  Thought Content: normal, no delusions, preoccupations, ruminations, or ideas of reference. No SI or HI.  Perception: Denies current AH or VH. Does not appear to be RIS.   Insight: fair, "I need help"  Judgement: poor, stopped taking medication and recent SA  Impulse Control: good  Gait: normal

## 2025-02-12 NOTE — BH INPATIENT PSYCHIATRY PROGRESS NOTE - PRN MEDS
MEDICATIONS  (PRN):  hydrOXYzine hydrochloride 25 milliGRAM(s) Oral every 6 hours PRN anxiety/ sleep disturbances  nicotine  Polacrilex Gum 2 milliGRAM(s) Oral every 4 hours PRN Smoking Cessation  OLANZapine Disintegrating Tablet 5 milliGRAM(s) Oral every 6 hours PRN psychotic agitation  OLANZapine Injectable 5 milliGRAM(s) IntraMuscular Once PRN severe psychotic agitation

## 2025-02-12 NOTE — BH INPATIENT PSYCHIATRY PROGRESS NOTE - NSBHFUPINTERVALHXFT_PSY_A_CORE
No overnight events, no PRNs needed, pt adherent to medications.    Pt slept well through the night and has been eating well. Pt was woken up for her interview and reported that her head felt "swollen," pt denied having a headache and attributed the feeling to being groggy from abruptly waking up. Pt gave two phone numbers to call to try and get in touch with her son for collateral information, and requested that we do not contact her mother who recently moved back to [place]. Pt also requested to see a foot doctor and gynecologist if she will be here for a long time. Discussed with patient that we can try to get her to see the foot doctor, but may have trouble getting her on the schedule so last minute. Also discussed with pt that gynecologists do not come to see patients at [facility] outside of emergencies, but that we can help set her up with an outpatient appointment following her discharge.

## 2025-02-13 PROCEDURE — 99231 SBSQ HOSP IP/OBS SF/LOW 25: CPT | Mod: GC

## 2025-02-13 RX ADMIN — Medication 3 MILLIGRAM(S): at 20:06

## 2025-02-13 RX ADMIN — OLANZAPINE 10 MILLIGRAM(S): 10 TABLET ORAL at 20:06

## 2025-02-13 RX ADMIN — NICOTINE POLACRILEX 2 MILLIGRAM(S): 4 GUM, CHEWING ORAL at 20:14

## 2025-02-13 RX ADMIN — NICOTINE POLACRILEX 2 MILLIGRAM(S): 4 GUM, CHEWING ORAL at 13:15

## 2025-02-13 NOTE — BH INPATIENT PSYCHIATRY PROGRESS NOTE - NSBHASSESSSUMMFT_PSY_ALL_CORE
39/F, unemployed, , lives in nearby apartment with 2 sons (16 and 20), with reported hx of depression, anxiety and psychosis, chronically non adherent to meds with multiple psych admissions, per PSYCKES, has multiple diagnoses to include: unspecified/Other Psychotic Disorders, Unspecified/Other Depressive Disorder, Adjustment Disorder, PTSD,  Schizophrenia, Delusional Disorder, Major Depressive Disorder, Other Mental Disorders, and Schizoaffective Disorder, presented to the ED BIB EMS activated by her "counselor" from "[facility]" due to concern for SA (overdosed on tablets few days ago).    Pt's mood and sweta symptoms (pressured speech, disorganized thinking) have improved since admission. Pt does not have active SI at this time, endorsed potential passive SI of "wanting to sleep for a very long time." Will explore possibility of long-acting injectable medication with the pt to improve treatment adherence.     Plan:  Admitted involuntary status on 1N, no CO needed, routine checks  olanzapine 10mg qhs  melatonin 3mg qhs    PRNs:  atarax 25mg q6 for anxiety  olanzapine 10mg q6 for agitation

## 2025-02-13 NOTE — BH INPATIENT PSYCHIATRY PROGRESS NOTE - NSBHFUPINTERVALHXFT_PSY_A_CORE
No overnight events, no PRNs needed, pt adherent to medications.    Pt feels tired overall but has been eating well. Pt reports that she does not sleep soundly through the night, but rather takes multiple naps from 2-4 hours throughout the night, which she has been doing for years. Pt said her head felt a little "fuzzy" this morning, which she attributed to being tired. Pt reports that she is feeling less overwhelmed and stressed then when she came in. Asked patient if she had been using any substances prior to her hospitalization given her sudden improvement which patient denied. Pt reported eating wild "berries" before she came into the hospital, clarified with patient that she consumed 17-18 blue/purple berries that she found on the side of the road, and took them to see if they would hurt her, and endorsed potential passive SI of "wanting to go to sleep for a very long time." No overnight events, no PRNs needed, pt adherent to medications.    Pt feels tired overall but has been eating well. Pt reports that she does not sleep soundly through the night, but rather takes multiple naps from 2-4 hours throughout the night, which she has been doing for years. Pt said her head felt a little "fuzzy" this morning, which she attributed to being tired. Pt reports that she is feeling less overwhelmed and stressed then when she came in. Asked patient if she had been using any substances prior to her hospitalization given her sudden improvement which patient denied. Pt reported eating wild "berries" before she came into the hospital, clarified with patient that she consumed 17-18 blue/purple berries that she found on the side of the road in the summertime, and took them to see if they would hurt her.

## 2025-02-13 NOTE — BH INPATIENT PSYCHIATRY PROGRESS NOTE - MSE UNSTRUCTURED FT
Appearance: patient appeared stated age wearing clothes, poor hygiene and poor grooming  Behavior: cooperative throughout interview  Eye contact: fair  Speech: Normal volume, still exhibiting increased rate and quantity but not as severe as on admission, no pressured speech  Mood: "okay"  Affect: restricted affect, constrictive range, congruent with mood  Thought Process: disorganized, tangential  Thought Content: normal, no delusions, preoccupations, ruminations, or ideas of reference. Endorsed potential past passive SI of "wanting to sleep for a very long time." No active SI or HI.  Perception: Denies current AH or VH. Does not appear to be RIS.   Insight: fair, feels she has improved since admission  Judgement: poor, stopped taking medication and recent SA  Impulse Control: good  Gait: normal Appearance: patient appeared stated age wearing clothes, poor hygiene and poor grooming  Behavior: cooperative throughout interview  Eye contact: fair  Speech: Normal volume, still exhibiting increased rate and quantity but not as severe as on admission, no pressured speech  Mood: "okay"  Affect: restricted affect, constrictive range, congruent with mood  Thought Process: disorganized, tangential  Thought Content: normal, no delusions, preoccupations, ruminations, or ideas of reference. No active SI or HI.  Perception: Denies current AH or VH. Does not appear to be RIS.   Insight: fair, feels she has improved since admission  Judgement: poor, stopped taking medication and recent SA  Impulse Control: good  Gait: normal

## 2025-02-14 PROCEDURE — 99231 SBSQ HOSP IP/OBS SF/LOW 25: CPT

## 2025-02-14 RX ORDER — INFLUENZA A VIRUS A/IDAHO/07/2018 (H1N1) ANTIGEN (MDCK CELL DERIVED, PROPIOLACTONE INACTIVATED, INFLUENZA A VIRUS A/INDIANA/08/2018 (H3N2) ANTIGEN (MDCK CELL DERIVED, PROPIOLACTONE INACTIVATED), INFLUENZA B VIRUS B/SINGAPORE/INFTT-16-0610/2016 ANTIGEN (MDCK CELL DERIVED, PROPIOLACTONE INACTIVATED), INFLUENZA B VIRUS B/IOWA/06/2017 ANTIGEN (MDCK CELL DERIVED, PROPIOLACTONE INACTIVATED) 15; 15; 15; 15 UG/.5ML; UG/.5ML; UG/.5ML; UG/.5ML
0.5 INJECTION, SUSPENSION INTRAMUSCULAR ONCE
Refills: 0 | Status: COMPLETED | OUTPATIENT
Start: 2025-02-14 | End: 2025-02-15

## 2025-02-14 RX ADMIN — NICOTINE POLACRILEX 2 MILLIGRAM(S): 4 GUM, CHEWING ORAL at 08:40

## 2025-02-14 RX ADMIN — OLANZAPINE 5 MILLIGRAM(S): 10 TABLET ORAL at 00:07

## 2025-02-14 RX ADMIN — OLANZAPINE 10 MILLIGRAM(S): 10 TABLET ORAL at 21:01

## 2025-02-14 RX ADMIN — Medication 3 MILLIGRAM(S): at 21:01

## 2025-02-14 RX ADMIN — HYDROXYZINE HYDROCHLORIDE 25 MILLIGRAM(S): 25 TABLET, FILM COATED ORAL at 00:07

## 2025-02-14 RX ADMIN — NICOTINE POLACRILEX 2 MILLIGRAM(S): 4 GUM, CHEWING ORAL at 21:01

## 2025-02-14 NOTE — BH INPATIENT PSYCHIATRY PROGRESS NOTE - NSBHASSESSSUMMFT_PSY_ALL_CORE
39/F, unemployed, , lives in nearby apartment with 2 sons (16 and 20), with reported hx of depression, anxiety and psychosis, chronically non adherent to meds with multiple psych admissions, per [facility], has multiple diagnoses to include: unspecified/Other Psychotic Disorders, Unspecified/Other Depressive Disorder, Adjustment Disorder, PTSD,  Schizophrenia, Delusional Disorder, Major Depressive Disorder, Other Mental Disorders, and Schizoaffective Disorder, presented to the ED BIB EMS activated by her "counselor" from "[facility] in [facility]" due to concern for SA (overdosed on tablets few days ago).    Pt's mood and sweta symptoms (pressured speech, disorganized thinking) have improved since admission. Pt does not have active or passive SI at this time, endorsed potential passive SI on admission of "wanting to sleep for a very long time." Will explore possibility of long-acting injectable medication with the pt to improve treatment adherence.     Plan:  Admitted involuntary status on 1N, no CO needed, routine checks  olanzapine 10mg qhs  melatonin 3mg qhs    PRNs:  atarax 25mg q6 for anxiety  olanzapine 10mg q6 for agitation

## 2025-02-14 NOTE — BH INPATIENT PSYCHIATRY PROGRESS NOTE - MSE UNSTRUCTURED FT
Appearance: patient appeared stated age wearing clothes, poor hygiene and poor grooming  Behavior: cooperative throughout interview  Eye contact: fair  Speech: Normal volume, still exhibiting increased rate and quantity but not as severe as on admission, no pressured speech  Mood: "fine"  Affect: happy affect, normal range, congruent with mood  Thought Process: disorganized, tangential  Thought Content: normal, no delusions, preoccupations, ruminations, or ideas of reference. No active SI or HI.  Perception: Denies current AH or VH. Does not appear to be RIS.   Insight: fair, feels she has improved since admission  Judgement: fair, understands importance of medication going forward  Impulse Control: good  Gait: normal

## 2025-02-14 NOTE — BH INPATIENT PSYCHIATRY PROGRESS NOTE - NSBHFUPINTERVALHXFT_PSY_A_CORE
No overnight events, no PRNs needed, pt adherent to medications.    Pt continues to take mini-naps rather than sleeping soundly through the night, but reports that she slept well and feels well rested. Pt feels like her mood has improved since her admission, when asked what could have contributed to this improvement pt responded that she feels surrounded by better people. Pt reports that she does not have current SI, upon admission patient was endorsing potential passive SI of "wanting to go to sleep for a very long time." Pt reports that those feelings were probably due to feeling overwhelmed with the stressors in her life, the main stressor being her relationship with her sons. Pt reports that she loves her sons very much and knows that they love her, but she has had trouble connecting with them recently on an emotional level, and feels that she has to work on herself first in order to be able to improve her relationship with them. Pt considers taking her medication and this hospital stay as a part of that process.

## 2025-02-15 RX ADMIN — NICOTINE POLACRILEX 2 MILLIGRAM(S): 4 GUM, CHEWING ORAL at 17:55

## 2025-02-15 RX ADMIN — Medication 3 MILLIGRAM(S): at 20:43

## 2025-02-15 RX ADMIN — INFLUENZA A VIRUS A/IDAHO/07/2018 (H1N1) ANTIGEN (MDCK CELL DERIVED, PROPIOLACTONE INACTIVATED, INFLUENZA A VIRUS A/INDIANA/08/2018 (H3N2) ANTIGEN (MDCK CELL DERIVED, PROPIOLACTONE INACTIVATED), INFLUENZA B VIRUS B/SINGAPORE/INFTT-16-0610/2016 ANTIGEN (MDCK CELL DERIVED, PROPIOLACTONE INACTIVATED), INFLUENZA B VIRUS B/IOWA/06/2017 ANTIGEN (MDCK CELL DERIVED, PROPIOLACTONE INACTIVATED) 0.5 MILLILITER(S): 15; 15; 15; 15 INJECTION, SUSPENSION INTRAMUSCULAR at 12:38

## 2025-02-15 RX ADMIN — OLANZAPINE 10 MILLIGRAM(S): 10 TABLET ORAL at 20:43

## 2025-02-15 RX ADMIN — NICOTINE POLACRILEX 2 MILLIGRAM(S): 4 GUM, CHEWING ORAL at 08:30

## 2025-02-15 NOTE — BH INPATIENT PSYCHIATRY PROGRESS NOTE - NSBHASSESSSUMMFT_PSY_ALL_CORE
39/F, unemployed, , lives in nearby apartment with 2 sons (16 and 20), with reported hx of depression, anxiety and psychosis, chronically non adherent to meds with multiple psych admissions, per [facility], has multiple diagnoses to include: unspecified/Other Psychotic Disorders, Unspecified/Other Depressive Disorder, Adjustment Disorder, PTSD,  Schizophrenia, Delusional Disorder, Major Depressive Disorder, Other Mental Disorders, and Schizoaffective Disorder, presented to the ED BIB EMS activated by her "counselor" from "[facility] in [facility]" due to concern for SA (overdosed on tablets few days ago).    Pt's mood and sweta symptoms (pressured speech, disorganized thinking) have improved since admission. Pt does not have active or passive SI at this time, endorsed potential passive SI on admission of "wanting to sleep for a very long time." Will explore possibility of long-acting injectable medication with the pt to improve treatment adherence.     2/15 Update  Remains disorganized but denying active SI  Plan:  Admitted involuntary status on 1N, no CO needed, routine checks  olanzapine 10mg qhs  melatonin 3mg qhs    PRNs:  atarax 25mg q6 for anxiety  olanzapine 10mg q6 for agitation

## 2025-02-15 NOTE — BH INPATIENT PSYCHIATRY PROGRESS NOTE - MSE UNSTRUCTURED FT
Appearance: patient appeared stated age wearing clothes, poor hygiene and poor grooming  Behavior: cooperative throughout interview  Eye contact: fair  Speech: Normal volume, still exhibiting increased rate and quantity but not as severe as on admission, no pressured speech  Mood: "OK"  Affect: happy affect, normal range, congruent with mood  Thought Process: disorganized, tangential  Thought Content: normal, no delusions, preoccupations, ruminations, or ideas of reference. No active SI or HI.  Perception: Denies current AH or VH. Does not appear to be RIS.   Insight: fair, feels she has improved since admission  Judgement: fair, understands importance of medication going forward  Impulse Control: good  Gait: normal Full Thickness Lip Wedge Repair (Flap) Text: Given the location of the defect and the proximity to free margins a full thickness wedge repair was deemed most appropriate.  Using a sterile surgical marker, the appropriate repair was drawn incorporating the defect and placing the expected incisions perpendicular to the vermilion border.  The vermilion border was also meticulously outlined to ensure appropriate reapproximation during the repair.  The area thus outlined was incised through and through with a #15 scalpel blade.  The muscularis and dermis were reaproximated with deep sutures following hemostasis. Care was taken to realign the vermilion border before proceeding with the superficial closure.  Once the vermilion was realigned the superfical and mucosal closure was finished.

## 2025-02-15 NOTE — BH INPATIENT PSYCHIATRY PROGRESS NOTE - NSBHFUPINTERVALHXFT_PSY_A_CORE
Patient seen for follow up of psychosis and disorganization   Chart reviewed and case discussed with Nursing Staff  No reported events over night and was adherent with medications  On examination today the patient states that she is doing OK  Continually corrects MD with her incorrect age and perseverates about it  Denies hopelessness and SI but "scared of the world out there"

## 2025-02-16 RX ORDER — DIPHENHYDRAMINE HCL 12.5MG/5ML
50 ELIXIR ORAL ONCE
Refills: 0 | Status: COMPLETED | OUTPATIENT
Start: 2025-02-16 | End: 2025-02-16

## 2025-02-16 RX ADMIN — NICOTINE POLACRILEX 2 MILLIGRAM(S): 4 GUM, CHEWING ORAL at 12:54

## 2025-02-16 RX ADMIN — NICOTINE POLACRILEX 2 MILLIGRAM(S): 4 GUM, CHEWING ORAL at 17:01

## 2025-02-16 RX ADMIN — Medication 3 MILLIGRAM(S): at 20:03

## 2025-02-16 RX ADMIN — Medication 50 MILLIGRAM(S): at 07:51

## 2025-02-16 RX ADMIN — OLANZAPINE 10 MILLIGRAM(S): 10 TABLET ORAL at 20:03

## 2025-02-17 RX ADMIN — Medication 3 MILLIGRAM(S): at 20:05

## 2025-02-17 RX ADMIN — HYDROXYZINE HYDROCHLORIDE 25 MILLIGRAM(S): 25 TABLET, FILM COATED ORAL at 23:40

## 2025-02-17 RX ADMIN — NICOTINE POLACRILEX 2 MILLIGRAM(S): 4 GUM, CHEWING ORAL at 09:05

## 2025-02-17 RX ADMIN — OLANZAPINE 5 MILLIGRAM(S): 10 TABLET ORAL at 23:40

## 2025-02-17 RX ADMIN — OLANZAPINE 10 MILLIGRAM(S): 10 TABLET ORAL at 20:04

## 2025-02-17 NOTE — BH INPATIENT PSYCHIATRY PROGRESS NOTE - NSBHASSESSSUMMFT_PSY_ALL_CORE
39/F, unemployed, , lives in nearby apartment with 2 sons (16 and 20), with reported hx of depression, anxiety and psychosis, chronically non adherent to meds with multiple psych admissions, per [facility], has multiple diagnoses to include: unspecified/Other Psychotic Disorders, Unspecified/Other Depressive Disorder, Adjustment Disorder, PTSD,  Schizophrenia, Delusional Disorder, Major Depressive Disorder, Other Mental Disorders, and Schizoaffective Disorder, presented to the ED BIB EMS activated by her "counselor" from "[facility] in [facility]" due to concern for SA (overdosed on tablets few days ago).    Pt's mood and sweta symptoms (pressured speech, disorganized thinking) have improved since admission. Pt does not have active or passive SI at this time, endorsed potential passive SI on admission of "wanting to sleep for a very long time." Will explore possibility of long-acting injectable medication with the pt to improve treatment adherence.     2/17 Update  Remains disorganized but denying active SI  Very discharged focused today on exam  Plan:  Admitted involuntary status on 1N, no CO needed, routine checks  olanzapine 10mg qhs  melatonin 3mg qhs    PRNs:  atarax 25mg q6 for anxiety  olanzapine 10mg q6 for agitation

## 2025-02-17 NOTE — BH INPATIENT PSYCHIATRY PROGRESS NOTE - MSE UNSTRUCTURED FT
Appearance: patient appeared stated age wearing clothes, poor hygiene and poor grooming  Behavior: cooperative throughout interview  Eye contact: fair  Speech: Normal volume, still exhibiting increased rate and quantity but not as severe as on admission, no pressured speech  Mood: "OK"  Affect: happy affect, normal range, congruent with mood  Thought Process: disorganized, tangential  Thought Content: normal, no delusions, preoccupations, ruminations, or ideas of reference. No active SI or HI.  Perception: Denies current AH or VH. Does not appear to be RIS.   Insight: fair, feels she has improved since admission  Judgement: fair, understands importance of medication going forward  Impulse Control: good  Gait: normal

## 2025-02-17 NOTE — BH INPATIENT PSYCHIATRY PROGRESS NOTE - NSBHFUPINTERVALHXFT_PSY_A_CORE
Patient seen for follow up of psychosis and disorganization   Chart reviewed and case discussed with Nursing Staff  No reported events over night and was adherent with medications  On examination today the patient states that she is doing OK and wants to be discharged  Unable to describe a concrete plan of who she lives with for us to contact   Firsts mentions her sons and states one is 18 yo  but then calls him a child and unable to answer questions from staff  Denies hopelessness and SI

## 2025-02-18 PROCEDURE — 99232 SBSQ HOSP IP/OBS MODERATE 35: CPT

## 2025-02-18 RX ORDER — FLUTICASONE PROPIONATE 50 UG/1
1 SPRAY, METERED NASAL
Refills: 0 | Status: DISCONTINUED | OUTPATIENT
Start: 2025-02-18 | End: 2025-03-13

## 2025-02-18 RX ADMIN — NICOTINE POLACRILEX 2 MILLIGRAM(S): 4 GUM, CHEWING ORAL at 17:47

## 2025-02-18 RX ADMIN — OLANZAPINE 10 MILLIGRAM(S): 10 TABLET ORAL at 20:19

## 2025-02-18 RX ADMIN — HYDROXYZINE HYDROCHLORIDE 25 MILLIGRAM(S): 25 TABLET, FILM COATED ORAL at 21:32

## 2025-02-18 RX ADMIN — Medication 3 MILLIGRAM(S): at 20:19

## 2025-02-18 NOTE — BH INPATIENT PSYCHIATRY PROGRESS NOTE - CURRENT MEDICATION
MEDICATIONS  (STANDING):  melatonin 3 milliGRAM(s) Oral at bedtime  OLANZapine 10 milliGRAM(s) Oral at bedtime    MEDICATIONS  (PRN):  hydrOXYzine hydrochloride 25 milliGRAM(s) Oral every 6 hours PRN anxiety/ sleep disturbances  nicotine  Polacrilex Gum 2 milliGRAM(s) Oral every 4 hours PRN Smoking Cessation  OLANZapine Disintegrating Tablet 5 milliGRAM(s) Oral every 6 hours PRN psychotic agitation  OLANZapine Injectable 5 milliGRAM(s) IntraMuscular Once PRN severe psychotic agitation   MEDICATIONS  (STANDING):  melatonin 3 milliGRAM(s) Oral at bedtime  OLANZapine 10 milliGRAM(s) Oral at bedtime    MEDICATIONS  (PRN):  fluticasone propionate 50 MICROgram(s)/spray Nasal Spray 1 Spray(s) Both Nostrils two times a day PRN nasal congestion  hydrOXYzine hydrochloride 25 milliGRAM(s) Oral every 6 hours PRN anxiety/ sleep disturbances  nicotine  Polacrilex Gum 2 milliGRAM(s) Oral every 4 hours PRN Smoking Cessation  OLANZapine Disintegrating Tablet 5 milliGRAM(s) Oral every 6 hours PRN psychotic agitation  OLANZapine Injectable 5 milliGRAM(s) IntraMuscular Once PRN severe psychotic agitation

## 2025-02-18 NOTE — BH INPATIENT PSYCHIATRY PROGRESS NOTE - NSBHASSESSSUMMFT_PSY_ALL_CORE
39/F, unemployed, , lives in nearby apartment with 2 sons (16 and 20), with reported hx of depression, anxiety and psychosis, chronically non adherent to meds with multiple psych admissions, per [facility], has multiple diagnoses to include: unspecified/Other Psychotic Disorders, Unspecified/Other Depressive Disorder, Adjustment Disorder, PTSD,  Schizophrenia, Delusional Disorder, Major Depressive Disorder, Other Mental Disorders, and Schizoaffective Disorder, presented to the ED BIB EMS activated by her "counselor" from "[facility] in [facility]" due to concern for SA (overdosed on tablets few days ago).    Pt's mood and sweta symptoms (pressured speech, disorganized thinking) have improved since admission. Pt does not have active or passive SI at this time, endorsed potential passive SI on admission of "wanting to sleep for a very long time." Will explore possibility of long-acting injectable medication with the pt to improve treatment adherence.     2/17 Update  Remains disorganized but denying active SI  Very discharged focused today on exam    Plan:  Admitted involuntary status on 1N, no CO needed, routine checks  olanzapine 10mg qhs  melatonin 3mg qhs    PRNs:  atarax 25mg q6 for anxiety  olanzapine 10mg q6 for agitation  flonase for nasal irritation 39/F, unemployed, , lives in nearby apartment with 2 sons (16 and 20), with reported hx of depression, anxiety and psychosis, chronically non adherent to meds with multiple psych admissions, per [facility], has multiple diagnoses to include: unspecified/Other Psychotic Disorders, Unspecified/Other Depressive Disorder, Adjustment Disorder, PTSD,  Schizophrenia, Delusional Disorder, Major Depressive Disorder, Other Mental Disorders, and Schizoaffective Disorder, presented to the ED BIB EMS activated by her "counselor" from "[facility] in [facility]" due to concern for SA (overdosed on tablets few days ago).    Pt's mood and sweta symptoms (pressured speech, disorganized thinking) have improved since admission. Pt does not have active or passive SI at this time, endorsed potential passive SI on admission of "wanting to sleep for a very long time." Will explore possibility of long-acting injectable medication with the pt to improve treatment adherence.     2/18 Update  Remains disorganized but denying active SI  Less discharged focused today on exam    Plan:  Admitted involuntary status on 1N, no CO needed, routine checks  olanzapine 10mg qhs  melatonin 3mg qhs    PRNs:  atarax 25mg q6 for anxiety  olanzapine 10mg q6 for agitation  flonase for nasal irritation

## 2025-02-18 NOTE — BH INPATIENT PSYCHIATRY PROGRESS NOTE - MSE UNSTRUCTURED FT
Appearance: patient appeared stated age wearing clothes, poor hygiene and poor grooming  Behavior: cooperative throughout interview  Eye contact: fair  Speech: Normal volume, still exhibiting increased rate and quantity but not as severe as on admission, no pressured speech  Mood: "joyful"  Affect: happy affect, normal range, congruent with mood  Thought Process: disorganized, tangential  Thought Content: normal, no delusions, preoccupations, ruminations, or ideas of reference. No active SI or HI.  Perception: Denies current AH or VH. Does not appear to be RIS.   Insight: fair, feels she has improved since admission  Judgement: fair, understands importance of medication going forward  Impulse Control: good  Gait: normal

## 2025-02-18 NOTE — BH INPATIENT PSYCHIATRY PROGRESS NOTE - PRN MEDS
MEDICATIONS  (PRN):  hydrOXYzine hydrochloride 25 milliGRAM(s) Oral every 6 hours PRN anxiety/ sleep disturbances  nicotine  Polacrilex Gum 2 milliGRAM(s) Oral every 4 hours PRN Smoking Cessation  OLANZapine Disintegrating Tablet 5 milliGRAM(s) Oral every 6 hours PRN psychotic agitation  OLANZapine Injectable 5 milliGRAM(s) IntraMuscular Once PRN severe psychotic agitation   MEDICATIONS  (PRN):  fluticasone propionate 50 MICROgram(s)/spray Nasal Spray 1 Spray(s) Both Nostrils two times a day PRN nasal congestion  hydrOXYzine hydrochloride 25 milliGRAM(s) Oral every 6 hours PRN anxiety/ sleep disturbances  nicotine  Polacrilex Gum 2 milliGRAM(s) Oral every 4 hours PRN Smoking Cessation  OLANZapine Disintegrating Tablet 5 milliGRAM(s) Oral every 6 hours PRN psychotic agitation  OLANZapine Injectable 5 milliGRAM(s) IntraMuscular Once PRN severe psychotic agitation

## 2025-02-18 NOTE — BH INPATIENT PSYCHIATRY PROGRESS NOTE - NSBHFUPINTERVALHXFT_PSY_A_CORE
Patient seen for follow up of psychosis and disorganization   Chart reviewed and case discussed with Nursing Staff  No reported events over night and was adherent with medications  Pt states she had a good weekend and is feeling like she is doing much better overall. Pt's stated mood today was "joyful." Pt reports that she spoke to her [facility] counselor this morning, and asked if someone from our social work team would be able to contact her counselor Marti to update her. Pt gave two numbers, general [facility]: [phone] and Marti's cell: [phone]. Pt also reports that she is still trying to get in contact with her older son, but reports that his phone has been dead when shes tried calling. Discussed with pt that we may consider increasing her dose of medication to see if her disorganized thinking improves, pt was agreeable. Pt also complained of some congestion/nasal irritation, discussed with pt that we can order her flonase for these symptoms.

## 2025-02-19 PROCEDURE — 99232 SBSQ HOSP IP/OBS MODERATE 35: CPT

## 2025-02-19 RX ORDER — OLANZAPINE 10 MG/1
15 TABLET ORAL AT BEDTIME
Refills: 0 | Status: DISCONTINUED | OUTPATIENT
Start: 2025-02-19 | End: 2025-02-24

## 2025-02-19 RX ADMIN — Medication 3 MILLIGRAM(S): at 20:23

## 2025-02-19 RX ADMIN — NICOTINE POLACRILEX 2 MILLIGRAM(S): 4 GUM, CHEWING ORAL at 20:26

## 2025-02-19 RX ADMIN — OLANZAPINE 5 MILLIGRAM(S): 10 TABLET ORAL at 01:35

## 2025-02-19 RX ADMIN — OLANZAPINE 15 MILLIGRAM(S): 10 TABLET ORAL at 20:23

## 2025-02-19 NOTE — BH INPATIENT PSYCHIATRY PROGRESS NOTE - CURRENT MEDICATION
MEDICATIONS  (STANDING):  melatonin 3 milliGRAM(s) Oral at bedtime  OLANZapine 10 milliGRAM(s) Oral at bedtime    MEDICATIONS  (PRN):  fluticasone propionate 50 MICROgram(s)/spray Nasal Spray 1 Spray(s) Both Nostrils two times a day PRN nasal congestion  hydrOXYzine hydrochloride 25 milliGRAM(s) Oral every 6 hours PRN anxiety/ sleep disturbances  nicotine  Polacrilex Gum 2 milliGRAM(s) Oral every 4 hours PRN Smoking Cessation  OLANZapine Disintegrating Tablet 5 milliGRAM(s) Oral every 6 hours PRN psychotic agitation  OLANZapine Injectable 5 milliGRAM(s) IntraMuscular Once PRN severe psychotic agitation

## 2025-02-19 NOTE — BH INPATIENT PSYCHIATRY PROGRESS NOTE - PRN MEDS
MEDICATIONS  (PRN):  fluticasone propionate 50 MICROgram(s)/spray Nasal Spray 1 Spray(s) Both Nostrils two times a day PRN nasal congestion  hydrOXYzine hydrochloride 25 milliGRAM(s) Oral every 6 hours PRN anxiety/ sleep disturbances  nicotine  Polacrilex Gum 2 milliGRAM(s) Oral every 4 hours PRN Smoking Cessation  OLANZapine Disintegrating Tablet 5 milliGRAM(s) Oral every 6 hours PRN psychotic agitation  OLANZapine Injectable 5 milliGRAM(s) IntraMuscular Once PRN severe psychotic agitation

## 2025-02-19 NOTE — BH INPATIENT PSYCHIATRY PROGRESS NOTE - NSBHFUPINTERVALHXFT_PSY_A_CORE
Patient seen for follow up of psychosis and disorganization   Chart reviewed and case discussed with treatment team  No reported events over night and was adherent with medications  Pt states she wants to be discharged to her son's apt or to return to her mom's house [place]  Pt gave # for her son but phone is not working when called.

## 2025-02-19 NOTE — BH INPATIENT PSYCHIATRY PROGRESS NOTE - NSBHASSESSSUMMFT_PSY_ALL_CORE
39/F, unemployed, , lives in nearby apartment with 2 sons (16 and 20), with reported hx of depression, anxiety and psychosis, chronically non adherent to meds with multiple psych admissions, per [facility], has multiple diagnoses to include: unspecified/Other Psychotic Disorders, Unspecified/Other Depressive Disorder, Adjustment Disorder, PTSD,  Schizophrenia, Delusional Disorder, Major Depressive Disorder, Other Mental Disorders, and Schizoaffective Disorder, presented to the ED BIB EMS activated by her "counselor" from "[facility]" due to concern for SA (overdosed on tablets few days ago).    Pt's mood and sweta symptoms (pressured speech, disorganized thinking) have improved since admission. Pt does not have active or passive SI at this time, endorsed potential passive SI on admission of "wanting to sleep for a very long time." Will explore possibility of long-acting injectable medication with the pt to improve treatment adherence.     2/19 Update  Remains disorganized but denying active SI  Discharged focused today on exam    Plan:  Admitted involuntary status on 1N, no CO needed, routine checks  olanzapine 15 mg qhs  melatonin 3mg qhs    PRNs:  atarax 25mg q6 for anxiety  olanzapine 10mg q6 for agitation  flonase for nasal irritation

## 2025-02-20 PROCEDURE — 99231 SBSQ HOSP IP/OBS SF/LOW 25: CPT

## 2025-02-20 PROCEDURE — 90853 GROUP PSYCHOTHERAPY: CPT

## 2025-02-20 RX ORDER — CLOTRIMAZOLE 1 G/100G
1 CREAM TOPICAL DAILY
Refills: 0 | Status: DISCONTINUED | OUTPATIENT
Start: 2025-02-20 | End: 2025-02-20

## 2025-02-20 RX ORDER — CLOTRIMAZOLE 1 G/100G
1 CREAM TOPICAL AT BEDTIME
Refills: 0 | Status: COMPLETED | OUTPATIENT
Start: 2025-02-20 | End: 2025-02-22

## 2025-02-20 RX ADMIN — HYDROXYZINE HYDROCHLORIDE 25 MILLIGRAM(S): 25 TABLET, FILM COATED ORAL at 00:40

## 2025-02-20 RX ADMIN — Medication 3 MILLIGRAM(S): at 20:01

## 2025-02-20 RX ADMIN — OLANZAPINE 15 MILLIGRAM(S): 10 TABLET ORAL at 20:01

## 2025-02-20 RX ADMIN — CLOTRIMAZOLE 1 APPLICATORFUL: 1 CREAM TOPICAL at 20:50

## 2025-02-20 NOTE — BH PSYCHOLOGY - GROUP THERAPY NOTE - NSBHPSYCHOLPARTICIPCOMMENT_PSY_A_CORE FT
Pt attended psychology group. She was internally preoccupied, intermittently engaged, and cooperative. At check-in, pt discussed the wish to feel unburdened by her past. Pt reflected on the poem about acceptance and willingness, sharing personal reflections that received positive feedback from peers.

## 2025-02-20 NOTE — BH INPATIENT PSYCHIATRY PROGRESS NOTE - NSBHFUPINTERVALHXFT_PSY_A_CORE
Patient seen for follow up of psychosis and disorganization   Chart reviewed and case discussed with treatment team  No reported events over night and was adherent with medications  Pt was very irritable and defensive during interview today, repeatedly stated "I am not a liar, I don't lie," but pt was not being accused of lying. Also clarified she has been trying to see a gynecologist because she has been bleeding irregularly for the past year, pt describes having some minor bleeding every other week for over a year. Pt also requested a monostat cream because she believes she may have a yeast infection. Pt also requested to go home today, as she believed she was admitted voluntarily and could go home at any time she requested. Discussed with pt that she has been admitted involuntarily, but showed her towards the phone number for her  and educated pt that she has the option to speak with an  and go to court if she would like to contest her admission. Pt was frustrated by this and reported that there was "no point" in speaking to an .  Patient seen for follow up of psychosis and disorganization   Chart reviewed and case discussed with treatment team  No reported events over night and was adherent with medications  Pt was very irritable and defensive during interview today, repeatedly stated "I am not a liar, I don't lie," but pt was not being accused of lying. Pt reports she spoke to her son yesterday and asked for us to contact him, but we discussed with patient that the phone number she gave us did not work, and she replied that his phone must be dead or her forgot to pay the bill. Also clarified she has been trying to see a gynecologist because she has been bleeding irregularly for the past year, pt describes having some minor bleeding every other week for over a year. Pt also requested a monostat cream because she believes she may have a yeast infection. Pt also requested to go home today, as she believed she was admitted voluntarily and could go home at any time she requested. Discussed with pt that she has been admitted involuntarily, but showed her towards the phone number for her  and educated pt that she has the option to speak with an  and go to court if she would like to contest her admission. Pt was frustrated by this and reported that there was "no point" in speaking to an .    Later in the day Pt apologized her being so irritable during her morning interview. Patient seen for follow up of psychosis and disorganization   Chart reviewed and case discussed with treatment team  No reported events over night and was adherent with medications  Pt was very irritable and defensive during interview today, repeatedly stated "I am not a liar, I don't lie," but pt was not being accused of lying. Pt reports she spoke to her son yesterday and asked for us to contact him, but we discussed with patient that the phone number she gave us did not work, and she replied that his phone must be dead or her forgot to pay the bill. Also clarified she has been trying to see a gynecologist because she has been bleeding irregularly for the past year, pt describes having some minor bleeding every other week for over a year. Pt also requested a monostat cream because she believes she may have a yeast infection. Pt also requested to go home today, as she believed she was admitted voluntarily and could go home at any time she requested. Discussed with pt that she has been admitted involuntarily, but showed her towards the phone number for her  and educated pt that she has the option to speak with an  and go to court if she would like to contest her admission. Pt was frustrated by this and reported that there was "no point" in speaking to an .    Later in the day Pt apologized her being so irritable during her morning interview.  Was shouting and dysregulated at another time but was easily de-escalated.

## 2025-02-20 NOTE — BH PSYCHOLOGY - GROUP THERAPY NOTE - TOKEN PULL-DIAGNOSIS
Primary Diagnosis:  Schizoaffective disorder, bipolar type [F25.0]        Problem Dx:   Depression with suicidal ideation [F32.A]

## 2025-02-20 NOTE — BH INPATIENT PSYCHIATRY PROGRESS NOTE - CURRENT MEDICATION
MEDICATIONS  (STANDING):  melatonin 3 milliGRAM(s) Oral at bedtime  OLANZapine 15 milliGRAM(s) Oral at bedtime    MEDICATIONS  (PRN):  fluticasone propionate 50 MICROgram(s)/spray Nasal Spray 1 Spray(s) Both Nostrils two times a day PRN nasal congestion  hydrOXYzine hydrochloride 25 milliGRAM(s) Oral every 6 hours PRN anxiety/ sleep disturbances  nicotine  Polacrilex Gum 2 milliGRAM(s) Oral every 4 hours PRN Smoking Cessation  OLANZapine Disintegrating Tablet 5 milliGRAM(s) Oral every 6 hours PRN psychotic agitation  OLANZapine Injectable 5 milliGRAM(s) IntraMuscular Once PRN severe psychotic agitation   MEDICATIONS  (STANDING):  clotrimazole 2% Vaginal Cream 1 Applicatorful Vaginal daily  melatonin 3 milliGRAM(s) Oral at bedtime  OLANZapine 15 milliGRAM(s) Oral at bedtime    MEDICATIONS  (PRN):  fluticasone propionate 50 MICROgram(s)/spray Nasal Spray 1 Spray(s) Both Nostrils two times a day PRN nasal congestion  hydrOXYzine hydrochloride 25 milliGRAM(s) Oral every 6 hours PRN anxiety/ sleep disturbances  nicotine  Polacrilex Gum 2 milliGRAM(s) Oral every 4 hours PRN Smoking Cessation  OLANZapine Disintegrating Tablet 5 milliGRAM(s) Oral every 6 hours PRN psychotic agitation  OLANZapine Injectable 5 milliGRAM(s) IntraMuscular Once PRN severe psychotic agitation

## 2025-02-20 NOTE — BH INPATIENT PSYCHIATRY PROGRESS NOTE - NSBHASSESSSUMMFT_PSY_ALL_CORE
39/F, unemployed, , lives in nearby apartment with 2 sons (16 and 20), with reported hx of depression, anxiety and psychosis, chronically non adherent to meds with multiple psych admissions, per [facility], has multiple diagnoses to include: unspecified/Other Psychotic Disorders, Unspecified/Other Depressive Disorder, Adjustment Disorder, PTSD,  Schizophrenia, Delusional Disorder, Major Depressive Disorder, Other Mental Disorders, and Schizoaffective Disorder, presented to the ED BIB EMS activated by her "counselor" from "[facility] in [facility]" due to concern for SA (overdosed on tablets few days ago).    Pt's mood and sweta symptoms (pressured speech, disorganized thinking) have improved since admission. Pt does not have active or passive SI at this time, endorsed potential passive SI on admission of "wanting to sleep for a very long time." Will explore possibility of long-acting injectable medication with the pt to improve treatment adherence.     2/19 Update  Remains disorganized but denying active SI  Discharged focused today on exam    Plan:  Admitted involuntary status on 1N, no CO needed, routine checks  olanzapine 15 mg qhs  melatonin 3mg qhs    PRNs:  atarax 25mg q6 for anxiety  olanzapine 10mg q6 for agitation  flonase for nasal irritation 39/F, unemployed, , lives in nearby apartment with 2 sons (16 and 20), with reported hx of depression, anxiety and psychosis, chronically non adherent to meds with multiple psych admissions, per PSYCK[facility], has multiple diagnoses to include: unspecified/Other Psychotic Disorders, Unspecified/Other Depressive Disorder, Adjustment Disorder, PTSD,  Schizophrenia, Delusional Disorder, Major Depressive Disorder, Other Mental Disorders, and Schizoaffective Disorder, presented to the ED BIB EMS activated by her "counselor" from "[facility] in [facility]" due to concern for SA (overdosed on tablets few days ago).    Pt's mood and sweta symptoms (pressured speech, disorganized thinking) have improved since admission. Pt does not have active or passive SI at this time, endorsed potential passive SI on admission of "wanting to sleep for a very long time." Will explore possibility of long-acting injectable medication with the pt to improve treatment adherence. Trying to contact pt's sons and/or [facility] counselor Marti for collateral, have been unsuccessful so far (general [facility]: [phone] and Marti's cell: [phone]).    Plan:  Admitted involuntary status on 1N, no CO needed, routine checks  olanzapine 15 mg qhs  melatonin 3mg qhs    PRNs:  atarax 25mg q6 for anxiety  olanzapine 10mg q6 for agitation  flonase for nasal irritation

## 2025-02-20 NOTE — BH INPATIENT PSYCHIATRY PROGRESS NOTE - MSE UNSTRUCTURED FT
Appearance: patient appeared stated age wearing clothes, poor hygiene and poor grooming  Behavior: cooperative throughout interview  Eye contact: fair  Speech: Normal volume, still exhibiting increased rate and quantity but not as severe as on admission, no pressured speech  Mood: "joyful"  Affect: happy affect, normal range, congruent with mood  Thought Process: disorganized, tangential  Thought Content: normal, no delusions, preoccupations, ruminations, or ideas of reference. No active SI or HI.  Perception: Denies current AH or VH. Does not appear to be RIS.   Insight: fair, feels she has improved since admission  Judgement: fair, understands importance of medication going forward  Impulse Control: good  Gait: normal Appearance: patient appeared stated age wearing clothes, poor hygiene and poor grooming  Behavior: cooperative but irritable throughout interview  Eye contact: fair  Speech: Normal volume, still exhibiting increased rate and quantity but not as severe as on admission, no pressured speech  Mood: "fine"  Affect: irritable affect, constricted range, incongruent with mood  Thought Process: disorganized, tangential  Thought Content: normal, no delusions, preoccupations, ruminations, or ideas of reference. No active SI or HI.  Perception: Denies current AH or VH. Does not appear to be RIS.   Insight: poor, believed she was admitted voluntarily and able to leave any time  Judgement: poor  Impulse Control: poor  Gait: normal

## 2025-02-20 NOTE — BH PSYCHOLOGY - GROUP THERAPY NOTE - NSPSYCHOLGRPCOGPT_PSY_A_CORE FT
Patient attended recovery oriented/acceptance & commitment therapy group. Group started with check-in to encourage engagement – “What is one word to describe how you’re feeling?” Members used words like anxious, conflicted, good, and unburdened. Group then focused on concept of acceptance and willingness through the poem, “Autobiography in Five Short Chapters.” Group members read through the poem, shared their perspectives and interpretations, and applied the concepts to their personal lives. Members related to the idea that we often stumble on the same roadblock before accepting the roadblock and choosing a different path. In relation to the poem, members concluded group by sharing where they hope to be in their recovery journey at the end of their hospital stay.  facilitated discussion of concepts, encouraged active participation, and supported members providing feedback to peers.

## 2025-02-21 PROCEDURE — 99231 SBSQ HOSP IP/OBS SF/LOW 25: CPT

## 2025-02-21 RX ORDER — OLANZAPINE 10 MG/1
5 TABLET ORAL DAILY
Refills: 0 | Status: DISCONTINUED | OUTPATIENT
Start: 2025-02-21 | End: 2025-03-13

## 2025-02-21 RX ADMIN — NICOTINE POLACRILEX 2 MILLIGRAM(S): 4 GUM, CHEWING ORAL at 12:48

## 2025-02-21 RX ADMIN — NICOTINE POLACRILEX 2 MILLIGRAM(S): 4 GUM, CHEWING ORAL at 17:34

## 2025-02-21 RX ADMIN — OLANZAPINE 15 MILLIGRAM(S): 10 TABLET ORAL at 19:59

## 2025-02-21 RX ADMIN — HYDROXYZINE HYDROCHLORIDE 25 MILLIGRAM(S): 25 TABLET, FILM COATED ORAL at 22:44

## 2025-02-21 RX ADMIN — CLOTRIMAZOLE 1 APPLICATORFUL: 1 CREAM TOPICAL at 22:02

## 2025-02-21 RX ADMIN — OLANZAPINE 5 MILLIGRAM(S): 10 TABLET ORAL at 22:44

## 2025-02-21 RX ADMIN — FLUTICASONE PROPIONATE 1 SPRAY(S): 50 SPRAY, METERED NASAL at 19:54

## 2025-02-21 RX ADMIN — OLANZAPINE 5 MILLIGRAM(S): 10 TABLET ORAL at 17:30

## 2025-02-21 RX ADMIN — Medication 3 MILLIGRAM(S): at 19:59

## 2025-02-21 RX ADMIN — FLUTICASONE PROPIONATE 1 SPRAY(S): 50 SPRAY, METERED NASAL at 13:18

## 2025-02-21 NOTE — BH INPATIENT PSYCHIATRY PROGRESS NOTE - CURRENT MEDICATION
MEDICATIONS  (STANDING):  clotrimazole 2% Vaginal Cream 1 Applicatorful Vaginal at bedtime  melatonin 3 milliGRAM(s) Oral at bedtime  OLANZapine 15 milliGRAM(s) Oral at bedtime    MEDICATIONS  (PRN):  fluticasone propionate 50 MICROgram(s)/spray Nasal Spray 1 Spray(s) Both Nostrils two times a day PRN nasal congestion  hydrOXYzine hydrochloride 25 milliGRAM(s) Oral every 6 hours PRN anxiety/ sleep disturbances  nicotine  Polacrilex Gum 2 milliGRAM(s) Oral every 4 hours PRN Smoking Cessation  OLANZapine Disintegrating Tablet 5 milliGRAM(s) Oral every 6 hours PRN psychotic agitation  OLANZapine Injectable 5 milliGRAM(s) IntraMuscular Once PRN severe psychotic agitation
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
## 2025-02-21 NOTE — BH INPATIENT PSYCHIATRY PROGRESS NOTE - NSBHATTESTCOMMENTATTENDFT_PSY_A_CORE
Patient remains overall disorganized but with a bit less formal thought disorder and less strained interview today.  Reports a bad dream she has just awoken from on encountering.  Continues to ask about discharge.  Tolerating olanzapine.  We discuss logistics of relprev and whether switching to a medication with an HUGHES not requring long observation might make more sense.  She says she will make a decision tomorrow.  
39/F, unemployed, , lives in nearby apartment with 2 sons (16 and 20), with reported hx of depression, anxiety and psychosis, chronically non adherent to meds with multiple psych admissions, per [facility], has multiple diagnoses to include: unspecified/Other Psychotic Disorders, Unspecified/Other Depressive Disorder, Adjustment Disorder, PTSD,  Schizophrenia, Delusional Disorder, Major Depressive Disorder, Other Mental Disorders, and Schizoaffective Disorder, presented to the ED BIB EMS activated by her "counselor" from "[facility]" due to concern for SA (overdosed on tablets few days ago).    2/18 Patient seen and agree with MS3 assessment and plan  Patient remains disorganized but less discharged focused today  Will continue plan  
Patient today in a better mood and more linear during interview.  Outside of interview, she asks me rather impulsively what I think of "cosmetics".  Discussed goal to do cosmetic procedures to improve her self esteem.  Keeps telling me I know details of these when I repeatedly say I do not.  Eventually says "there is a block here" gesturing between us and walks away.  Will continue Zyprexa titration.  
Patient is irritable on encounter.  States she wants to go home.  Was shouting and dysregulated at another time but was easily de-escalated. Presentation remains consistent with sweta with psychosis. Continue to titrate Zyprexa.
Patient describes feeling of paranoia yesterday during verbal outburst.  Felt like she was under threat from people she knows who are not here in the hospital.  She is also intrusive and asks for discharge multiple times throughout the rest of the day.  Will increase Zyprexa with AM dose. 
Patient is bit clearer in speech and more organized in behavior.  Is trying to get in touch with her sons with no success and has no working phone numbers.  Is tolerating Zyprexa well.  Continue with 10mg HS.

## 2025-02-21 NOTE — BH INPATIENT PSYCHIATRY PROGRESS NOTE - MSE UNSTRUCTURED FT
Appearance: patient appeared stated age wearing clothes, poor hygiene and poor grooming  Behavior: cooperative throughout interview  Eye contact: fair  Speech: Normal volume, still exhibiting increased rate and quantity but not as severe as on admission, no pressured speech  Mood: "fine"  Affect: neutral affect, constricted range, congruent with mood  Thought Process: disorganized, tangential  Thought Content: exhibited confusion about last time shes spoken to her son, no delusions, preoccupations, ruminations, or ideas of reference. No active SI or HI.  Perception: Denies current AH or VH. Does not appear to be RIS.   Insight: poor, believed she was admitted voluntarily and able to leave any time  Judgement: poor  Impulse Control: poor  Gait: normal Appearance: patient appeared stated age wearing clothes, poor hygiene and poor grooming  Behavior: cooperative throughout interview  Eye contact: fair  Speech: Normal volume, still exhibiting increased rate and quantity but not as severe as on admission, no pressured speech  Mood: "fine"  Affect: neutral affect, constricted range, congruent with mood  Thought Process:  tangential  Thought Content: exhibited confusion about last time shes spoken to her son, no delusions, preoccupations, ruminations, or ideas of reference. No active SI or HI.  Perception: Denies current AH or VH. Does not appear to be RIS.   Insight: poor, believed she was admitted voluntarily and able to leave any time  Judgement: poor  Impulse Control: poor  Gait: normal

## 2025-02-21 NOTE — BH INPATIENT PSYCHIATRY PROGRESS NOTE - NSBHASSESSSUMMFT_PSY_ALL_CORE
39/F, unemployed, , lives in nearby apartment with 2 sons (16 and 20), with reported hx of depression, anxiety and psychosis, chronically non adherent to meds with multiple psych admissions, per PSY[facility]ES, has multiple diagnoses to include: unspecified/Other Psychotic Disorders, Unspecified/Other Depressive Disorder, Adjustment Disorder, PTSD,  Schizophrenia, Delusional Disorder, Major Depressive Disorder, Other Mental Disorders, and Schizoaffective Disorder, presented to the ED BIB EMS activated by her "counselor" from "[facility] in [facility]" due to concern for SA (overdosed on tablets few days ago).    Pt's mood and sweta symptoms (pressured speech, disorganized thinking) have improved since admission. Pt does not have active or passive SI at this time, endorsed potential passive SI on admission of "wanting to sleep for a very long time." Will explore possibility of long-acting injectable medication with the pt to improve treatment adherence. Trying to contact pt's sons and/or [facility] counselor Marti for collateral, have been unsuccessful so far (general [facility]: [phone] and Marti's cell: [phone]).    Plan:  Admitted involuntary status on 1N, no CO needed, routine checks  olanzapine 15 mg qhs  melatonin 3mg qhs    PRNs:  atarax 25mg q6 for anxiety  olanzapine 10mg q6 for agitation  flonase for nasal irritation 39/F, unemployed, , lives in nearby apartment with 2 sons (16 and 20), with reported hx of depression, anxiety and psychosis, chronically non adherent to meds with multiple psych admissions, per PSYCKES, has multiple diagnoses to include: unspecified/Other Psychotic Disorders, Unspecified/Other Depressive Disorder, Adjustment Disorder, PTSD,  Schizophrenia, Delusional Disorder, Major Depressive Disorder, Other Mental Disorders, and Schizoaffective Disorder, presented to the ED BIB EMS activated by her "counselor" from "[facility] in [facility]" due to concern for SA (overdosed on tablets few days ago).    Pt's mood and sweta symptoms (pressured speech, disorganized thinking) have improved since admission. Pt does not have active or passive SI at this time. Will explore possibility of long-acting injectable medication with the pt to improve treatment adherence. Trying to contact pt's sons and/or [facility] counselor Marti for collateral, have been unsuccessful so far (general [facility]: [phone] and Marti's cell: [phone]).    Plan:  Admitted involuntary status on 1N, no CO needed, routine checks  olanzapine 15 mg qhs  melatonin 3mg qhs    PRNs:  atarax 25mg q6 for anxiety  olanzapine 10mg q6 for agitation  flonase for nasal irritation 39/F, unemployed, , lives in nearby apartment with 2 sons (16 and 20), with reported hx of depression, anxiety and psychosis, chronically non adherent to meds with multiple psych admissions, per PSYCKES, has multiple diagnoses to include: unspecified/Other Psychotic Disorders, Unspecified/Other Depressive Disorder, Adjustment Disorder, PTSD,  Schizophrenia, Delusional Disorder, Major Depressive Disorder, Other Mental Disorders, and Schizoaffective Disorder, presented to the ED BIB EMS activated by her "counselor" from "[facility] in [facility]" due to concern for SA (overdosed on tablets few days ago).    Pt's mood and sweta symptoms (pressured speech, disorganized thinking) have improved since admission. Pt does not have active or passive SI at this time. Will explore possibility of long-acting injectable medication with the pt to improve treatment adherence. Trying to contact pt's sons and/or [facility] counselor Marti for collateral, have been unsuccessful so far (general [facility]: [phone] and Marti's cell: [phone]).    Plan:  Admitted involuntary status on 1N, no CO needed, routine checks  olanzapine 15 mg qhs, 5mg qAM  melatonin 3mg qhs    PRNs:  atarax 25mg q6 for anxiety  olanzapine 10mg q6 for agitation  flonase for nasal irritation

## 2025-02-22 RX ADMIN — OLANZAPINE 15 MILLIGRAM(S): 10 TABLET ORAL at 20:29

## 2025-02-22 RX ADMIN — FLUTICASONE PROPIONATE 1 SPRAY(S): 50 SPRAY, METERED NASAL at 09:29

## 2025-02-22 RX ADMIN — OLANZAPINE 5 MILLIGRAM(S): 10 TABLET ORAL at 08:17

## 2025-02-22 RX ADMIN — HYDROXYZINE HYDROCHLORIDE 25 MILLIGRAM(S): 25 TABLET, FILM COATED ORAL at 22:00

## 2025-02-22 RX ADMIN — NICOTINE POLACRILEX 2 MILLIGRAM(S): 4 GUM, CHEWING ORAL at 15:08

## 2025-02-22 RX ADMIN — CLOTRIMAZOLE 1 APPLICATORFUL: 1 CREAM TOPICAL at 20:51

## 2025-02-22 RX ADMIN — Medication 3 MILLIGRAM(S): at 20:29

## 2025-02-23 RX ADMIN — Medication 3 MILLIGRAM(S): at 20:32

## 2025-02-23 RX ADMIN — OLANZAPINE 15 MILLIGRAM(S): 10 TABLET ORAL at 20:32

## 2025-02-23 RX ADMIN — HYDROXYZINE HYDROCHLORIDE 25 MILLIGRAM(S): 25 TABLET, FILM COATED ORAL at 22:57

## 2025-02-23 RX ADMIN — OLANZAPINE 5 MILLIGRAM(S): 10 TABLET ORAL at 08:26

## 2025-02-24 PROCEDURE — 99231 SBSQ HOSP IP/OBS SF/LOW 25: CPT

## 2025-02-24 RX ORDER — OLANZAPINE 10 MG/1
20 TABLET ORAL AT BEDTIME
Refills: 0 | Status: DISCONTINUED | OUTPATIENT
Start: 2025-02-24 | End: 2025-03-13

## 2025-02-24 RX ADMIN — NICOTINE POLACRILEX 2 MILLIGRAM(S): 4 GUM, CHEWING ORAL at 18:23

## 2025-02-24 RX ADMIN — NICOTINE POLACRILEX 2 MILLIGRAM(S): 4 GUM, CHEWING ORAL at 12:33

## 2025-02-24 RX ADMIN — OLANZAPINE 20 MILLIGRAM(S): 10 TABLET ORAL at 20:08

## 2025-02-24 RX ADMIN — OLANZAPINE 5 MILLIGRAM(S): 10 TABLET ORAL at 08:36

## 2025-02-24 RX ADMIN — HYDROXYZINE HYDROCHLORIDE 25 MILLIGRAM(S): 25 TABLET, FILM COATED ORAL at 21:08

## 2025-02-24 RX ADMIN — FLUTICASONE PROPIONATE 1 SPRAY(S): 50 SPRAY, METERED NASAL at 20:43

## 2025-02-24 RX ADMIN — Medication 3 MILLIGRAM(S): at 20:08

## 2025-02-24 NOTE — BH INPATIENT PSYCHIATRY PROGRESS NOTE - CURRENT MEDICATION
MEDICATIONS  (STANDING):  melatonin 3 milliGRAM(s) Oral at bedtime  OLANZapine 5 milliGRAM(s) Oral daily  OLANZapine 15 milliGRAM(s) Oral at bedtime    MEDICATIONS  (PRN):  fluticasone propionate 50 MICROgram(s)/spray Nasal Spray 1 Spray(s) Both Nostrils two times a day PRN nasal congestion  hydrOXYzine hydrochloride 25 milliGRAM(s) Oral every 6 hours PRN anxiety/ sleep disturbances  nicotine  Polacrilex Gum 2 milliGRAM(s) Oral every 4 hours PRN Smoking Cessation  OLANZapine Disintegrating Tablet 5 milliGRAM(s) Oral every 6 hours PRN psychotic agitation  OLANZapine Injectable 5 milliGRAM(s) IntraMuscular Once PRN severe psychotic agitation

## 2025-02-24 NOTE — BH INPATIENT PSYCHIATRY PROGRESS NOTE - NSBHFUPINTERVALHXFT_PSY_A_CORE
Patient seen for follow up of psychosis and disorganization   Chart reviewed and case discussed with treatment team  No reported events over night and was adherent with medications  Patient was asked about dysregulation witnessed on Friday.  Of note, the patient told me that I can "tell Massiel to shove these [glasses] up her...".  She states that her ex-'s various lovers follow her.  She has not laid eyes on any of them in 2 years but she states they continue to follow her.  Denies that they were in the hospital and does not have an answer for why her outburst seemingly towards or about them seemed so immediate.  She stated that morning dose of Zyprexa has been helping with some of these feelings.  Stated her overdose before admission was motivated by her distress over these multiple women.

## 2025-02-24 NOTE — BH INPATIENT PSYCHIATRY PROGRESS NOTE - NSBHASSESSSUMMFT_PSY_ALL_CORE
39/F, unemployed, , lives in nearby apartment with 2 sons (16 and 20), with reported hx of depression, anxiety and psychosis, chronically non adherent to meds with multiple psych admissions, per PSYCKES, has multiple diagnoses to include: unspecified/Other Psychotic Disorders, Unspecified/Other Depressive Disorder, Adjustment Disorder, PTSD,  Schizophrenia, Delusional Disorder, Major Depressive Disorder, Other Mental Disorders, and Schizoaffective Disorder, presented to the ED BIB EMS activated by her "counselor" from "[facility] in [facility]" due to concern for SA (overdosed on tablets few days ago).    Pt's mood and sweta symptoms (pressured speech, disorganized thinking) have improved since admission. Pt does not have active or passive SI at this time. Will explore possibility of long-acting injectable medication with the pt to improve treatment adherence. Trying to contact pt's sons and/or [facility] counselor Marti for collateral, have been unsuccessful so far (general [facility]: [phone] and Marti's cell: [phone]).    Plan:  Admitted involuntary status on 1N, no CO needed, routine checks  olanzapine to 20 mg qhs, 5mg qAM  melatonin 3mg qhs    PRNs:  atarax 25mg q6 for anxiety  olanzapine 10mg q6 for agitation  flonase for nasal irritation

## 2025-02-24 NOTE — BH INPATIENT PSYCHIATRY PROGRESS NOTE - MSE UNSTRUCTURED FT
Appearance: patient appeared stated age wearing clothes, poor hygiene and poor grooming  Behavior: cooperative throughout interview  Eye contact: fair  Speech: Normal volume, still exhibiting increased rate and quantity but not as severe as on admission, no pressured speech  Mood: "fine"  Affect: neutral affect, constricted range, congruent with mood  Thought Process:  tangential, loose associations  Thought Content: again exhibited confusion about last time shes spoken to her son, about time of events she was distressed about. +persecutory delusions. No active SI or HI.  Perception: Denies current AH or VH. Does not appear to be RIS.   Insight: poor, believed she was admitted voluntarily and able to leave any time  Judgement: poor  Impulse Control: poor  Gait: normal

## 2025-02-25 PROCEDURE — 99232 SBSQ HOSP IP/OBS MODERATE 35: CPT

## 2025-02-25 RX ADMIN — OLANZAPINE 5 MILLIGRAM(S): 10 TABLET ORAL at 08:11

## 2025-02-25 RX ADMIN — NICOTINE POLACRILEX 2 MILLIGRAM(S): 4 GUM, CHEWING ORAL at 10:17

## 2025-02-25 RX ADMIN — NICOTINE POLACRILEX 2 MILLIGRAM(S): 4 GUM, CHEWING ORAL at 20:16

## 2025-02-25 RX ADMIN — OLANZAPINE 20 MILLIGRAM(S): 10 TABLET ORAL at 20:14

## 2025-02-25 RX ADMIN — FLUTICASONE PROPIONATE 1 SPRAY(S): 50 SPRAY, METERED NASAL at 08:10

## 2025-02-25 RX ADMIN — Medication 3 MILLIGRAM(S): at 20:15

## 2025-02-25 RX ADMIN — HYDROXYZINE HYDROCHLORIDE 25 MILLIGRAM(S): 25 TABLET, FILM COATED ORAL at 22:26

## 2025-02-25 RX ADMIN — OLANZAPINE 5 MILLIGRAM(S): 10 TABLET ORAL at 00:16

## 2025-02-25 NOTE — BH INPATIENT PSYCHIATRY PROGRESS NOTE - NSBHTIMEACTIVITIESPERFORMED_PSY_A_CORE
Time spent includes interviewing patient, reviewing the medical record, charting and discussion of patient at interdisciplinary team meeting and excluded teaching.

## 2025-02-25 NOTE — BH INPATIENT PSYCHIATRY PROGRESS NOTE - CURRENT MEDICATION
MEDICATIONS  (STANDING):  melatonin 3 milliGRAM(s) Oral at bedtime  OLANZapine 5 milliGRAM(s) Oral daily  OLANZapine 20 milliGRAM(s) Oral at bedtime    MEDICATIONS  (PRN):  fluticasone propionate 50 MICROgram(s)/spray Nasal Spray 1 Spray(s) Both Nostrils two times a day PRN nasal congestion  hydrOXYzine hydrochloride 25 milliGRAM(s) Oral every 6 hours PRN anxiety/ sleep disturbances  nicotine  Polacrilex Gum 2 milliGRAM(s) Oral every 4 hours PRN Smoking Cessation  OLANZapine Disintegrating Tablet 5 milliGRAM(s) Oral every 6 hours PRN psychotic agitation  OLANZapine Injectable 5 milliGRAM(s) IntraMuscular Once PRN severe psychotic agitation

## 2025-02-25 NOTE — BH INPATIENT PSYCHIATRY PROGRESS NOTE - NSBHASSESSSUMMFT_PSY_ALL_CORE
39/F, unemployed, , lives in nearby apartment with 2 sons (16 and 20), with reported hx of depression, anxiety and psychosis, chronically non adherent to meds with multiple psych admissions, per [facility], has multiple diagnoses to include: unspecified/Other Psychotic Disorders, Unspecified/Other Depressive Disorder, Adjustment Disorder, PTSD,  Schizophrenia, Delusional Disorder, Major Depressive Disorder, Other Mental Disorders, and Schizoaffective Disorder, presented to the ED BIB EMS activated by her "counselor" from "[facility] in [facility]" due to concern for SA (overdosed on tablets few days ago).    Pt's mood and sweta symptoms (pressured speech, disorganized thinking) have improved since admission. Pt does not have active or passive SI at this time, endorsed potential passive SI on admission of "wanting to sleep for a very long time." Will explore possibility of long-acting injectable medication with the pt to improve treatment adherence.     2/25 Update  Remains disorganized but denying active SI and denies SE  Discharged focused today on exam    Plan:  Admitted involuntary status on 1N, no CO needed, routine checks  olanzapine 5 mg daily and 20 mg qhs  melatonin 3mg qhs    PRNs:  atarax 25mg q6 for anxiety  olanzapine 10mg q6 for agitation  flonase for nasal irritation

## 2025-02-25 NOTE — BH INPATIENT PSYCHIATRY PROGRESS NOTE - NSBHFUPINTERVALHXFT_PSY_A_CORE
Patient seen for follow up of psychosis and disorganization   Chart reviewed and case discussed with treatment team  No reported events over night and was adherent with medications and denies any new SE  Pt states she wants to be discharged to her son's apt and then move to her mom's house in [place]

## 2025-02-26 PROCEDURE — 99231 SBSQ HOSP IP/OBS SF/LOW 25: CPT

## 2025-02-26 RX ORDER — ACETAMINOPHEN 500 MG/5ML
650 LIQUID (ML) ORAL EVERY 6 HOURS
Refills: 0 | Status: DISCONTINUED | OUTPATIENT
Start: 2025-02-26 | End: 2025-03-13

## 2025-02-26 RX ADMIN — OLANZAPINE 20 MILLIGRAM(S): 10 TABLET ORAL at 20:39

## 2025-02-26 RX ADMIN — Medication 3 MILLIGRAM(S): at 20:39

## 2025-02-26 RX ADMIN — NICOTINE POLACRILEX 2 MILLIGRAM(S): 4 GUM, CHEWING ORAL at 20:39

## 2025-02-26 RX ADMIN — OLANZAPINE 5 MILLIGRAM(S): 10 TABLET ORAL at 08:47

## 2025-02-26 RX ADMIN — FLUTICASONE PROPIONATE 1 SPRAY(S): 50 SPRAY, METERED NASAL at 09:44

## 2025-02-26 NOTE — BH INPATIENT PSYCHIATRY PROGRESS NOTE - CURRENT MEDICATION
MEDICATIONS  (STANDING):  melatonin 3 milliGRAM(s) Oral at bedtime  OLANZapine 5 milliGRAM(s) Oral daily  OLANZapine 20 milliGRAM(s) Oral at bedtime    MEDICATIONS  (PRN):  acetaminophen     Tablet .. 650 milliGRAM(s) Oral every 6 hours PRN Temp greater or equal to 38C (100.4F), Mild Pain (1 - 3), Moderate Pain (4 - 6)  fluticasone propionate 50 MICROgram(s)/spray Nasal Spray 1 Spray(s) Both Nostrils two times a day PRN nasal congestion  hydrOXYzine hydrochloride 25 milliGRAM(s) Oral every 6 hours PRN anxiety/ sleep disturbances  nicotine  Polacrilex Gum 2 milliGRAM(s) Oral every 4 hours PRN Smoking Cessation  OLANZapine Disintegrating Tablet 5 milliGRAM(s) Oral every 6 hours PRN psychotic agitation  OLANZapine Injectable 5 milliGRAM(s) IntraMuscular Once PRN severe psychotic agitation

## 2025-02-26 NOTE — BH INPATIENT PSYCHIATRY PROGRESS NOTE - NSBHASSESSSUMMFT_PSY_ALL_CORE
39/F, unemployed, , lives in nearby apartment with 2 sons (16 and 20), with reported hx of depression, anxiety and psychosis, chronically non adherent to meds with multiple psych admissions, per [facility], has multiple diagnoses to include: unspecified/Other Psychotic Disorders, Unspecified/Other Depressive Disorder, Adjustment Disorder, PTSD,  Schizophrenia, Delusional Disorder, Major Depressive Disorder, Other Mental Disorders, and Schizoaffective Disorder, presented to the ED BIB EMS activated by her "counselor" from "[facility] in [facility]" due to concern for SA (overdosed on tablets few days ago).    Pt's mood and sweta symptoms (pressured speech, disorganized thinking) have improved since admission. Pt does not have active or passive SI at this time, endorsed potential passive SI on admission of "wanting to sleep for a very long time." Will explore possibility of long-acting injectable medication with the pt to improve treatment adherence.     Remains a bit disorganized but denying active SI and denies SE.  Less paranoid overall  She remains discharge focused today on exam    Plan:  Admitted involuntary status on 1N, no CO needed, routine checks  olanzapine 5 mg daily and 20 mg qhs  melatonin 3mg qhs    PRNs:  atarax 25mg q6 for anxiety  olanzapine 10mg q6 for agitation  flonase for nasal irritation

## 2025-02-26 NOTE — BH INPATIENT PSYCHIATRY PROGRESS NOTE - NSBHFUPINTERVALHXFT_PSY_A_CORE
Patient seen for follow up of psychosis and disorganization   Chart reviewed and case discussed with treatment team  No reported events over night and was adherent with medications and denies any new SE.  Discussed that I received a VM from her son and called him back, leaving a VM myself.  We discuss information we will need from him to schedule a follow up appointment.  She denies any further paranoia about the women she was mentioning, saying they cannot get to her here.

## 2025-02-26 NOTE — BH INPATIENT PSYCHIATRY PROGRESS NOTE - PRN MEDS
MEDICATIONS  (PRN):  acetaminophen     Tablet .. 650 milliGRAM(s) Oral every 6 hours PRN Temp greater or equal to 38C (100.4F), Mild Pain (1 - 3), Moderate Pain (4 - 6)  fluticasone propionate 50 MICROgram(s)/spray Nasal Spray 1 Spray(s) Both Nostrils two times a day PRN nasal congestion  hydrOXYzine hydrochloride 25 milliGRAM(s) Oral every 6 hours PRN anxiety/ sleep disturbances  nicotine  Polacrilex Gum 2 milliGRAM(s) Oral every 4 hours PRN Smoking Cessation  OLANZapine Disintegrating Tablet 5 milliGRAM(s) Oral every 6 hours PRN psychotic agitation  OLANZapine Injectable 5 milliGRAM(s) IntraMuscular Once PRN severe psychotic agitation

## 2025-02-26 NOTE — BH INPATIENT PSYCHIATRY PROGRESS NOTE - MSE UNSTRUCTURED FT
Appearance: patient appeared stated age wearing clothes, poor hygiene and poor grooming  Behavior: cooperative throughout interview  Eye contact: fair  Speech: Normal volume, still exhibiting increased rate and quantity but not as severe as on admission, no pressured speech  Mood: "fine"  Affect: neutral affect, constricted range, congruent with mood  Thought Process:  tangential, loose associations  Thought Content: persecutory delusions receding. No active SI or HI.  Perception: Denies current AH or VH. Does not appear to be RIS.   Insight: poor, believed she was admitted voluntarily and able to leave any time  Judgement: poor  Impulse Control: poor  Gait: normal

## 2025-02-27 PROCEDURE — 99231 SBSQ HOSP IP/OBS SF/LOW 25: CPT

## 2025-02-27 RX ADMIN — OLANZAPINE 20 MILLIGRAM(S): 10 TABLET ORAL at 20:04

## 2025-02-27 RX ADMIN — NICOTINE POLACRILEX 2 MILLIGRAM(S): 4 GUM, CHEWING ORAL at 13:21

## 2025-02-27 RX ADMIN — NICOTINE POLACRILEX 2 MILLIGRAM(S): 4 GUM, CHEWING ORAL at 20:04

## 2025-02-27 RX ADMIN — Medication 3 MILLIGRAM(S): at 20:04

## 2025-02-27 RX ADMIN — OLANZAPINE 5 MILLIGRAM(S): 10 TABLET ORAL at 09:53

## 2025-02-27 RX ADMIN — HYDROXYZINE HYDROCHLORIDE 25 MILLIGRAM(S): 25 TABLET, FILM COATED ORAL at 22:44

## 2025-02-27 NOTE — BH INPATIENT PSYCHIATRY PROGRESS NOTE - NSBHFUPINTERVALHXFT_PSY_A_CORE
Patient seen for follow up of psychosis and disorganization   Chart reviewed and case discussed with treatment team  No reported events over night and was adherent with medications and denies any new SE.  She denies any further paranoia about the women she was mentioning, saying they cannot get to her here.  States she is sleeping well.  Raises some concerns about needing to make a court date for February 30 but is reminded this is not a date and she perhaps means March 30.  She requests we get in touch with a  Marti but she does not have the number.  Denies depressed or irritable mood and none is observed.

## 2025-02-27 NOTE — BH INPATIENT PSYCHIATRY PROGRESS NOTE - MSE UNSTRUCTURED FT
Appearance: patient appeared stated age wearing clothes, poor hygiene and poor grooming  Behavior: cooperative throughout interview  Eye contact: fair  Speech: Normal volume, still exhibiting increased rate and quantity but not as severe as on admission, no pressured speech  Mood: "fine"  Affect: neutral affect, constricted range, congruent with mood  Thought Process:  loose associations  Thought Content: persecutory delusions receding. No active SI or HI.  Perception: Denies current AH or VH. Does not appear to be RIS.   Insight: poor, believed she was admitted voluntarily and able to leave any time  Judgement: poor  Impulse Control: poor  Gait: normal

## 2025-02-27 NOTE — BH INPATIENT PSYCHIATRY PROGRESS NOTE - NSBHASSESSSUMMFT_PSY_ALL_CORE
39/F, unemployed, , lives in nearby apartment with 2 sons (16 and 20), with reported hx of depression, anxiety and psychosis, chronically non adherent to meds with multiple psych admissions, per PSYCKES, has multiple diagnoses to include: unspecified/Other Psychotic Disorders, Unspecified/Other Depressive Disorder, Adjustment Disorder, PTSD,  Schizophrenia, Delusional Disorder, Major Depressive Disorder, Other Mental Disorders, and Schizoaffective Disorder, presented to the ED BIB EMS activated by her "counselor" from "[facility] in [facility]" due to concern for SA (overdosed on tablets few days ago).    Pt's mood and sweta symptoms (pressured speech, disorganized thinking) have improved since admission. Pt does not have active or passive SI at this time, endorsed potential passive SI on admission of "wanting to sleep for a very long time." Will explore possibility of long-acting injectable medication with the pt to improve treatment adherence.     Remains a bit disorganized but denying active SI and denies SE.  Less paranoid overall.  She remains discharge focused today on exam    Plan:  Admitted involuntary status on 1N, no CO needed, routine checks  olanzapine 5 mg daily and 20 mg qhs  melatonin 3mg qhs    PRNs:  atarax 25mg q6 for anxiety  olanzapine 10mg q6 for agitation  flonase for nasal irritation

## 2025-02-28 PROCEDURE — 99231 SBSQ HOSP IP/OBS SF/LOW 25: CPT

## 2025-02-28 RX ADMIN — OLANZAPINE 20 MILLIGRAM(S): 10 TABLET ORAL at 20:27

## 2025-02-28 RX ADMIN — NICOTINE POLACRILEX 2 MILLIGRAM(S): 4 GUM, CHEWING ORAL at 09:48

## 2025-02-28 RX ADMIN — Medication 3 MILLIGRAM(S): at 20:27

## 2025-02-28 RX ADMIN — NICOTINE POLACRILEX 2 MILLIGRAM(S): 4 GUM, CHEWING ORAL at 17:50

## 2025-02-28 RX ADMIN — OLANZAPINE 5 MILLIGRAM(S): 10 TABLET ORAL at 08:17

## 2025-02-28 NOTE — BH INPATIENT PSYCHIATRY PROGRESS NOTE - MSE UNSTRUCTURED FT
Appearance: patient appeared stated age wearing clothes, poor hygiene and poor grooming  Behavior: cooperative throughout interview  Eye contact: fair  Speech: Normal volume, still exhibiting increased rate and quantity but not as severe as on admission, no pressured speech  Mood: "better"  Affect: neutral affect, constricted range, congruent with mood  Thought Process:  loose associations  Thought Content: persecutory delusions receding. No active SI or HI.  Perception: Denies current AH or VH. Does not appear to be RIS.   Insight: fair  Judgement: emerging  Impulse Control: appropriate to the setting  Gait: normal

## 2025-02-28 NOTE — BH INPATIENT PSYCHIATRY PROGRESS NOTE - NSBHASSESSSUMMFT_PSY_ALL_CORE
39/F, unemployed, , lives in nearby apartment with 2 sons (16 and 20), with reported hx of depression, anxiety and psychosis, chronically non adherent to meds with multiple psych admissions, per PSYCKES, has multiple diagnoses to include: unspecified/Other Psychotic Disorders, Unspecified/Other Depressive Disorder, Adjustment Disorder, PTSD,  Schizophrenia, Delusional Disorder, Major Depressive Disorder, Other Mental Disorders, and Schizoaffective Disorder, presented to the ED BIB EMS activated by her "counselor" from "[facility] in [facility]" due to concern for SA (overdosed on tablets few days ago).    Pt's mood and sweta symptoms (pressured speech, disorganized thinking) have improved since admission. Pt does not have active or passive SI at this time, endorsed potential passive SI on admission of "wanting to sleep for a very long time." Will explore possibility of long-acting injectable medication with the pt to improve treatment adherence.     LESS disorganized and denying active SI and denies SE.  Less paranoid overall.    Plan:  Admitted involuntary status on 1N, no CO needed, routine checks  olanzapine 5 mg daily and 20 mg qhs  melatonin 3mg qhs    PRNs:  atarax 25mg q6 for anxiety  olanzapine 10mg q6 for agitation  flonase for nasal irritation

## 2025-02-28 NOTE — BH INPATIENT PSYCHIATRY PROGRESS NOTE - NSBHFUPINTERVALHXFT_PSY_A_CORE
Patient seen for follow up of psychosis and disorganization   Chart reviewed and case discussed with treatment team  No reported events over night and was adherent with medications and denies any new SE.  She is in better behavioral control, reports a better mood and is more organized than before.  States she is weighing whether to return to [place] with her mother and sister but fears that they will expect her to listen to them if they pay for her ticket.  She says that she does like being there and is considering it.  Says she wants to party but denies she actively uses marijuana.  Cautioned about effects of marijuana on mood and psychotic sx.  Continuing to try to reach son.

## 2025-03-01 RX ADMIN — OLANZAPINE 5 MILLIGRAM(S): 10 TABLET ORAL at 08:18

## 2025-03-01 RX ADMIN — NICOTINE POLACRILEX 2 MILLIGRAM(S): 4 GUM, CHEWING ORAL at 17:29

## 2025-03-01 RX ADMIN — OLANZAPINE 20 MILLIGRAM(S): 10 TABLET ORAL at 20:21

## 2025-03-01 RX ADMIN — Medication 650 MILLIGRAM(S): at 23:19

## 2025-03-01 RX ADMIN — Medication 3 MILLIGRAM(S): at 20:21

## 2025-03-01 RX ADMIN — NICOTINE POLACRILEX 2 MILLIGRAM(S): 4 GUM, CHEWING ORAL at 08:39

## 2025-03-01 RX ADMIN — Medication 650 MILLIGRAM(S): at 22:05

## 2025-03-01 RX ADMIN — HYDROXYZINE HYDROCHLORIDE 25 MILLIGRAM(S): 25 TABLET, FILM COATED ORAL at 15:01

## 2025-03-02 RX ADMIN — OLANZAPINE 20 MILLIGRAM(S): 10 TABLET ORAL at 20:10

## 2025-03-02 RX ADMIN — NICOTINE POLACRILEX 2 MILLIGRAM(S): 4 GUM, CHEWING ORAL at 08:26

## 2025-03-02 RX ADMIN — Medication 3 MILLIGRAM(S): at 20:11

## 2025-03-02 RX ADMIN — OLANZAPINE 5 MILLIGRAM(S): 10 TABLET ORAL at 08:26

## 2025-03-02 RX ADMIN — FLUTICASONE PROPIONATE 1 SPRAY(S): 50 SPRAY, METERED NASAL at 20:17

## 2025-03-02 RX ADMIN — NICOTINE POLACRILEX 2 MILLIGRAM(S): 4 GUM, CHEWING ORAL at 20:11

## 2025-03-03 PROCEDURE — 99231 SBSQ HOSP IP/OBS SF/LOW 25: CPT

## 2025-03-03 RX ADMIN — OLANZAPINE 5 MILLIGRAM(S): 10 TABLET ORAL at 08:35

## 2025-03-03 RX ADMIN — NICOTINE POLACRILEX 2 MILLIGRAM(S): 4 GUM, CHEWING ORAL at 08:36

## 2025-03-03 RX ADMIN — OLANZAPINE 20 MILLIGRAM(S): 10 TABLET ORAL at 20:07

## 2025-03-03 RX ADMIN — Medication 3 MILLIGRAM(S): at 20:07

## 2025-03-03 RX ADMIN — NICOTINE POLACRILEX 2 MILLIGRAM(S): 4 GUM, CHEWING ORAL at 15:55

## 2025-03-03 RX ADMIN — Medication 650 MILLIGRAM(S): at 16:42

## 2025-03-03 RX ADMIN — Medication 650 MILLIGRAM(S): at 22:00

## 2025-03-03 RX ADMIN — Medication 650 MILLIGRAM(S): at 20:57

## 2025-03-03 RX ADMIN — Medication 650 MILLIGRAM(S): at 15:54

## 2025-03-03 RX ADMIN — NICOTINE POLACRILEX 2 MILLIGRAM(S): 4 GUM, CHEWING ORAL at 20:11

## 2025-03-03 NOTE — BH INPATIENT PSYCHIATRY PROGRESS NOTE - NSBHFUPINTERVALHXFT_PSY_A_CORE
Patient seen for follow up of psychosis and disorganization   Chart reviewed and case discussed with treatment team  No reported events over night and was adherent with medications and denies any new SE.  She is in better behavioral control, reports a better mood and is more organized than before.  Discusses that her mother called this weekend saying her cousin had  and she feels sad about this.  She appropriately discusses acceptance.  She no longer discusses any spontaneous paranoid thought content.  Notes that AM dose of Zyprexa is helpful.  Continues to try to get in touch with her son which has been difficult at times.

## 2025-03-03 NOTE — BH INPATIENT PSYCHIATRY PROGRESS NOTE - NSBHASSESSSUMMFT_PSY_ALL_CORE
39/F, unemployed, , lives in nearby apartment with 2 sons (16 and 20), with reported hx of depression, anxiety and psychosis, chronically non adherent to meds with multiple psych admissions, per PSYCKES, has multiple diagnoses to include: unspecified/Other Psychotic Disorders, Unspecified/Other Depressive Disorder, Adjustment Disorder, PTSD,  Schizophrenia, Delusional Disorder, Major Depressive Disorder, Other Mental Disorders, and Schizoaffective Disorder, presented to the ED BIB EMS activated by her "counselor" from "[facility] in [facility]" due to concern for SA (overdosed on tablets few days ago).    Pt's mood and sweta symptoms (pressured speech, disorganized thinking) have improved since admission. Pt does not have active or passive SI at this time, endorsed potential passive SI on admission of "wanting to sleep for a very long time." Will explore possibility of long-acting injectable medication with the pt to improve treatment adherence.     LESS disorganized and denying active SI and denies SE.  Less paranoid overall.    Plan:  Admitted involuntary status on 1N, no CO needed, routine checks  olanzapine 5 mg daily and 20 mg qhs  melatonin 3mg qhs    PRNs:  atarax 25mg q6 for anxiety  olanzapine 10mg q6 for agitation  flonase for nasal irritation

## 2025-03-04 PROCEDURE — 99231 SBSQ HOSP IP/OBS SF/LOW 25: CPT

## 2025-03-04 RX ADMIN — Medication 650 MILLIGRAM(S): at 13:28

## 2025-03-04 RX ADMIN — Medication 650 MILLIGRAM(S): at 20:30

## 2025-03-04 RX ADMIN — Medication 650 MILLIGRAM(S): at 14:46

## 2025-03-04 RX ADMIN — Medication 650 MILLIGRAM(S): at 19:37

## 2025-03-04 RX ADMIN — OLANZAPINE 20 MILLIGRAM(S): 10 TABLET ORAL at 20:25

## 2025-03-04 RX ADMIN — NICOTINE POLACRILEX 2 MILLIGRAM(S): 4 GUM, CHEWING ORAL at 08:27

## 2025-03-04 RX ADMIN — Medication 3 MILLIGRAM(S): at 20:25

## 2025-03-04 RX ADMIN — OLANZAPINE 5 MILLIGRAM(S): 10 TABLET ORAL at 08:26

## 2025-03-04 NOTE — BH INPATIENT PSYCHIATRY PROGRESS NOTE - NSBHASSESSSUMMFT_PSY_ALL_CORE
39/F, unemployed, , lives in nearby apartment with 2 sons (16 and 20), with reported hx of depression, anxiety and psychosis, chronically non adherent to meds with multiple psych admissions, per PSYCKES, has multiple diagnoses to include: unspecified/Other Psychotic Disorders, Unspecified/Other Depressive Disorder, Adjustment Disorder, PTSD,  Schizophrenia, Delusional Disorder, Major Depressive Disorder, Other Mental Disorders, and Schizoaffective Disorder, presented to the ED BIB EMS activated by her "counselor" from "[facility] in [facility]" due to concern for SA (overdosed on tablets few days ago).    Pt's mood and sweta symptoms (pressured speech, disorganized thinking) have improved since admission. Pt does not have active or passive SI at this time, endorsed potential passive SI on admission of "wanting to sleep for a very long time." Will explore possibility of long-acting injectable medication with the pt to improve treatment adherence.    3/4 Update  More cooperative and less disorganized and denying active SI and denies SE.     Plan:  Admitted involuntary status on 1N, no CO needed, routine checks  olanzapine 5 mg daily and 20 mg qhs  melatonin 3mg qhs    PRNs:  atarax 25mg q6 for anxiety  olanzapine 10mg q6 for agitation  flonase for nasal irritation

## 2025-03-04 NOTE — BH INPATIENT PSYCHIATRY PROGRESS NOTE - NSBHFUPINTERVALHXFT_PSY_A_CORE
Patient seen for follow up of psychosis and disorganization   Chart reviewed and case discussed with treatment team  No reported events over night and was adherent with medications and denies any new SE.  Patient reports better mood though sad she cannot be with her son on his birthday  She no longer discusses any spontaneous paranoid thought content.    Still feels AM dose of Zyprexa is helpful.

## 2025-03-05 PROCEDURE — 99231 SBSQ HOSP IP/OBS SF/LOW 25: CPT

## 2025-03-05 RX ADMIN — OLANZAPINE 5 MILLIGRAM(S): 10 TABLET ORAL at 08:19

## 2025-03-05 RX ADMIN — Medication 3 MILLIGRAM(S): at 20:00

## 2025-03-05 RX ADMIN — Medication 650 MILLIGRAM(S): at 21:10

## 2025-03-05 RX ADMIN — Medication 650 MILLIGRAM(S): at 20:10

## 2025-03-05 RX ADMIN — Medication 650 MILLIGRAM(S): at 10:45

## 2025-03-05 RX ADMIN — HYDROXYZINE HYDROCHLORIDE 25 MILLIGRAM(S): 25 TABLET, FILM COATED ORAL at 23:30

## 2025-03-05 RX ADMIN — NICOTINE POLACRILEX 2 MILLIGRAM(S): 4 GUM, CHEWING ORAL at 21:53

## 2025-03-05 RX ADMIN — Medication 650 MILLIGRAM(S): at 08:47

## 2025-03-05 RX ADMIN — NICOTINE POLACRILEX 2 MILLIGRAM(S): 4 GUM, CHEWING ORAL at 13:06

## 2025-03-05 RX ADMIN — OLANZAPINE 20 MILLIGRAM(S): 10 TABLET ORAL at 20:00

## 2025-03-05 NOTE — BH INPATIENT PSYCHIATRY PROGRESS NOTE - NSBHFUPINTERVALHXFT_PSY_A_CORE
Patient seen and evaluated for follow up of psychosis and disorganization. Chart reviewed and case discussed with treatment team.  No reported events over night and was adherent with medications and denies any new SE.  She is in better behavioral control, reports a better mood and is more organized than before.  Discusses that she's trying to exercise to keep busy and denies SI/HI.  No agitation noted.  No somatic complaints.

## 2025-03-05 NOTE — BH INPATIENT PSYCHIATRY PROGRESS NOTE - NSBHASSESSSUMMFT_PSY_ALL_CORE
39/F, unemployed, , lives in nearby apartment with 2 sons (16 and 20), with reported hx of depression, anxiety and psychosis, chronically non adherent to meds with multiple psych admissions, per PSYCKES, has multiple diagnoses to include: unspecified/Other Psychotic Disorders, Unspecified/Other Depressive Disorder, Adjustment Disorder, PTSD,  Schizophrenia, Delusional Disorder, Major Depressive Disorder, Other Mental Disorders, and Schizoaffective Disorder, presented to the ED BIB EMS activated by her "counselor" from "[facility] in [facility]" due to concern for SA (overdosed on tablets few days ago).    Pt's mood and sweta symptoms (pressured speech, disorganized thinking) have improved since admission. Pt does not have active or passive SI at this time, endorsed potential passive SI on admission of "wanting to sleep for a very long time." Will explore possibility of long-acting injectable medication with the pt to improve treatment adherence.     LESS disorganized and denying active SI and denies SE.  Less paranoid overall.    Plan:  Admitted involuntary status on 1N, no CO needed, routine checks  olanzapine 5 mg daily and 20 mg qhs  melatonin 3mg qhs    PRNs:  atarax 25mg q6 for anxiety  olanzapine 10mg q6 for agitation  flonase for nasal irritation

## 2025-03-06 PROCEDURE — 99231 SBSQ HOSP IP/OBS SF/LOW 25: CPT

## 2025-03-06 RX ADMIN — Medication 650 MILLIGRAM(S): at 16:17

## 2025-03-06 RX ADMIN — NICOTINE POLACRILEX 2 MILLIGRAM(S): 4 GUM, CHEWING ORAL at 09:21

## 2025-03-06 RX ADMIN — Medication 3 MILLIGRAM(S): at 20:17

## 2025-03-06 RX ADMIN — FLUTICASONE PROPIONATE 1 SPRAY(S): 50 SPRAY, METERED NASAL at 16:20

## 2025-03-06 RX ADMIN — OLANZAPINE 20 MILLIGRAM(S): 10 TABLET ORAL at 20:17

## 2025-03-06 RX ADMIN — OLANZAPINE 5 MILLIGRAM(S): 10 TABLET ORAL at 08:39

## 2025-03-06 RX ADMIN — NICOTINE POLACRILEX 2 MILLIGRAM(S): 4 GUM, CHEWING ORAL at 18:19

## 2025-03-06 NOTE — BH INPATIENT PSYCHIATRY PROGRESS NOTE - NSBHASSESSSUMMFT_PSY_ALL_CORE
39/F, unemployed, , lives in nearby apartment with 2 sons (16 and 20), with reported hx of depression, anxiety and psychosis, chronically non adherent to meds with multiple psych admissions, per PSYCKES, has multiple diagnoses to include: unspecified/Other Psychotic Disorders, Unspecified/Other Depressive Disorder, Adjustment Disorder, PTSD,  Schizophrenia, Delusional Disorder, Major Depressive Disorder, Other Mental Disorders, and Schizoaffective Disorder, presented to the ED BIB EMS activated by her "counselor" from "[facility] in [facility]" due to concern for SA (overdosed on tablets few days ago).    Pt's mood and sweta symptoms (pressured speech, disorganized thinking) have improved since admission. Pt does not have active or passive SI at this time, endorsed potential passive SI on admission of "wanting to sleep for a very long time." Will explore possibility of long-acting injectable medication with the pt to improve treatment adherence.     LESS disorganized and denying active SI and denies SE.  Less paranoid overall.    Plan:  Admitted involuntary status on 1N, no CO needed, routine checks  olanzapine 5 mg daily and 20 mg qhs  melatonin 3mg qhs    PRNs:  atarax 25mg q6 for anxiety  olanzapine 10mg q6 for agitation  flonase for nasal irritation

## 2025-03-06 NOTE — BH INPATIENT PSYCHIATRY PROGRESS NOTE - NSBHFUPINTERVALHXFT_PSY_A_CORE
Patient seen and evaluated for follow up of psychosis and disorganization. Chart reviewed and case discussed with treatment team.  No reported events overnight and was adherent with medications and denies any new SE.  She is in better behavioral control, reports a better mood.  States she does know SW was able to contact her  from [facility] and she is in agreement with HUGHES.  I explain need to confirm if Zyprexa RelPrev can be accommodated by her outpatient psychiatrist as many do not give this medication.    She denies SI/HI.  No agitation noted.  No somatic complaints.

## 2025-03-07 RX ADMIN — Medication 650 MILLIGRAM(S): at 15:23

## 2025-03-07 RX ADMIN — NICOTINE POLACRILEX 2 MILLIGRAM(S): 4 GUM, CHEWING ORAL at 20:01

## 2025-03-07 RX ADMIN — Medication 3 MILLIGRAM(S): at 20:01

## 2025-03-07 RX ADMIN — OLANZAPINE 20 MILLIGRAM(S): 10 TABLET ORAL at 20:01

## 2025-03-07 RX ADMIN — Medication 650 MILLIGRAM(S): at 21:26

## 2025-03-07 RX ADMIN — NICOTINE POLACRILEX 2 MILLIGRAM(S): 4 GUM, CHEWING ORAL at 15:24

## 2025-03-07 RX ADMIN — OLANZAPINE 5 MILLIGRAM(S): 10 TABLET ORAL at 08:42

## 2025-03-07 RX ADMIN — NICOTINE POLACRILEX 2 MILLIGRAM(S): 4 GUM, CHEWING ORAL at 09:58

## 2025-03-07 NOTE — BH TREATMENT PLAN - NSTXDISORGINTERPR_PSY_ALL_CORE
During time of engagement, patient presented with euthymic mood. Patient was dressed in hospital gown and fairly groomed. Upon review, patient has made good progress towards specificied goal as evidenced by pts ability to identify reading as positive coping skill. Psych rehab staff will continue to support patient via 1:1 engagement and encourage programming attendance in effort to faviliate continued progress towards goal.    Patient has attended most psychiatric rehabilitation groups during the last seven days.
During time of engagement, patient presented with euthymic mood. Patient was dressed in hospital gown and fairly groomed. Upon review, patient has made good progress towards specificied goal as evidenced by pts ability to identify reading as positive coping skill. Psych rehab staff will continue to support patient via 1:1 engagement and encourage programming attendance in effort to faviliate continued progress towards goal.    Patient has attended most psychiatric rehabilitation groups during the last seven days.

## 2025-03-07 NOTE — BH INPATIENT PSYCHIATRY PROGRESS NOTE - NSBHFUPINTERVALHXFT_PSY_A_CORE
Patient seen and evaluated for follow up of psychosis and disorganization. Chart reviewed and case discussed with treatment team.  She denies SI/HI.  No agitation noted.  No somatic complaints.  Denies side effects.  She is engaged with the milieu and in groups.  She states she is feeling fine and denies residual paranoia.  Explains she sings songs to herself during time when alone and is not talking to herself.  She gives a phone number for her cousin Tamika who connects with  today.  Tamika confirms she can assist with transportation to appointment and staying with her when she receives Zyprexa RelPrevv.

## 2025-03-07 NOTE — BH TREATMENT PLAN - NSTXSUICIDINTERRN_PSY_ALL_CORE
Assess her suicidality, mood and behavior and medication management.
Assess her mood and behavior  and medication management.

## 2025-03-07 NOTE — BH INPATIENT PSYCHIATRY PROGRESS NOTE - NSBHASSESSSUMMFT_PSY_ALL_CORE
39/F, unemployed, , lives in nearby apartment with 2 sons (16 and 20), with reported hx of depression, anxiety and psychosis, chronically non adherent to meds with multiple psych admissions, per [facility], has multiple diagnoses to include: unspecified/Other Psychotic Disorders, Unspecified/Other Depressive Disorder, Adjustment Disorder, PTSD,  Schizophrenia, Delusional Disorder, Major Depressive Disorder, Other Mental Disorders, and Schizoaffective Disorder, presented to the ED BIB EMS activated by her "counselor" from "[facility] in [facility]" due to concern for SA (overdosed on tablets few days ago).    Pt's mood and sweta symptoms (pressured speech, disorganized thinking) have improved since admission. Pt does not have active or passive SI at this time, endorsed potential passive SI on admission of "wanting to sleep for a very long time." Will explore possibility of long-acting injectable medication with the pt to improve treatment adherence.     Patient with no overt paranoia, improved on exam, engaged in milieu.  Will transition to Zyprexa RelPrevv with family support confirmed.    Plan:  Admitted involuntary status on 1N, no CO needed, routine checks  olanzapine 5 mg daily and 20 mg qhs  melatonin 3mg qhs    PRNs:  atarax 25mg q6 for anxiety  olanzapine 10mg q6 for agitation  flonase for nasal irritation

## 2025-03-07 NOTE — BH TREATMENT PLAN - NSTXSUICIDINTERPR_PSY_ALL_CORE
Writer met pt to review progress into her psych rehab goal. Pt was calm and cooperative upon approach. Pt was dressed in her own clothes and presented with adequate grooming. Pt has made progress into her psych rehab goal of identifying coping strategies for better sxs management, as pt is able to identify practicing self-love, contacting her family and workout as her coping strategies. Within the group setting, pt remains visible and social with select peers. Pt attended 80% of psych rehab groups over the past week. Pt is able to appropriately participate in group activities and discussions. Psych rehab staff will continue to support patient via 1:1 engagement and encourage programming attendance in effort to facilitate continued progress towards goal.
Writer met pt to review progress into her psych rehab goal. Pt was somewhat anxious but overall cooperative upon approach. Pt was dressed in her own clothes and presented with poor ADLs. Pt has made minimal progress into her psych rehab goal of identifying coping strategies for better sxs management, as pt continues to report coloring as her coping strategy and is unable to identify additional coping strategies. Within the group setting, pt has been visible and social with select peers. pt attended approximately 50% of psych rehab groups over the past week. Pt is minimally verbal but is able to follow direction and appropriately participate in groups. Psych rehab staff will continue to support patient via 1:1 engagement and encourage programming attendance in effort to facilitate continued progress towards goal.

## 2025-03-07 NOTE — BH TREATMENT PLAN - NSTXPSYCHOGOAL_PSY_ALL_CORE
Will identify 2 coping skills that assist with focus on reality
Will engage in a 15 minute conversation with no irrational content

## 2025-03-07 NOTE — BH TREATMENT PLAN - NSTXDEPRESINTERRN_PSY_ALL_CORE
Encourage to verbalize more her feelings and concerns  ,attend unit groups and medication management.
Continue medication management and monitor her mood and behavior.

## 2025-03-07 NOTE — BH TREATMENT PLAN - NSTXDCOPNOINTERSW_PSY_ALL_CORE
SW provides support, psychoed, contact with collaterals, discharge planning and collaboration with treatment team.
SW provides support,psychoed, contact with collaterals, discahrge planning and collaboration with treatment team.

## 2025-03-07 NOTE — BH INPATIENT PSYCHIATRY PROGRESS NOTE - MSE UNSTRUCTURED FT
Appearance: patient appeared stated age wearing clothes, appropriate hygiene and grooming  Behavior: cooperative throughout interview  Eye contact: fair  Speech: Normal volume, rate, prosody  Mood: "better"  Affect: neutral affect, constricted range, congruent with mood  Thought Process:  linear  Thought Content: No apparent delusions. No active SI or HI.  Perception: Denies current AH or VH. Does not appear to be RIS.   Insight: fair  Judgement: emerging  Impulse Control: appropriate to the setting  Gait: normal

## 2025-03-07 NOTE — BH TREATMENT PLAN - NSTXDISORGGOAL_PSY_ALL_CORE
Will identify 2 coping skills that assist in organizing
Will demonstrate the ability to maintain reality orientation and communicate clearly with others during 2 conversations with staff daily

## 2025-03-07 NOTE — BH TREATMENT PLAN - NSTXVIOLNTGOAL_PSY_ALL_CORE
Will decrease the number/duration/intensity of angry/aggressive outbursts
Will be able to walk away from an interpersonal conflict and use a self-soothing skill

## 2025-03-08 RX ADMIN — OLANZAPINE 5 MILLIGRAM(S): 10 TABLET ORAL at 08:43

## 2025-03-08 RX ADMIN — Medication 3 MILLIGRAM(S): at 20:43

## 2025-03-08 RX ADMIN — NICOTINE POLACRILEX 2 MILLIGRAM(S): 4 GUM, CHEWING ORAL at 08:43

## 2025-03-08 RX ADMIN — Medication 650 MILLIGRAM(S): at 09:31

## 2025-03-08 RX ADMIN — Medication 650 MILLIGRAM(S): at 21:11

## 2025-03-08 RX ADMIN — Medication 650 MILLIGRAM(S): at 10:20

## 2025-03-08 RX ADMIN — FLUTICASONE PROPIONATE 1 SPRAY(S): 50 SPRAY, METERED NASAL at 21:12

## 2025-03-08 RX ADMIN — OLANZAPINE 20 MILLIGRAM(S): 10 TABLET ORAL at 20:43

## 2025-03-08 RX ADMIN — NICOTINE POLACRILEX 2 MILLIGRAM(S): 4 GUM, CHEWING ORAL at 21:42

## 2025-03-08 RX ADMIN — Medication 650 MILLIGRAM(S): at 22:00

## 2025-03-09 RX ADMIN — Medication 650 MILLIGRAM(S): at 15:39

## 2025-03-09 RX ADMIN — Medication 650 MILLIGRAM(S): at 23:46

## 2025-03-09 RX ADMIN — Medication 3 MILLIGRAM(S): at 21:10

## 2025-03-09 RX ADMIN — OLANZAPINE 5 MILLIGRAM(S): 10 TABLET ORAL at 08:56

## 2025-03-09 RX ADMIN — Medication 650 MILLIGRAM(S): at 14:58

## 2025-03-09 RX ADMIN — NICOTINE POLACRILEX 2 MILLIGRAM(S): 4 GUM, CHEWING ORAL at 21:10

## 2025-03-09 RX ADMIN — Medication 650 MILLIGRAM(S): at 22:22

## 2025-03-09 RX ADMIN — NICOTINE POLACRILEX 2 MILLIGRAM(S): 4 GUM, CHEWING ORAL at 10:12

## 2025-03-09 RX ADMIN — FLUTICASONE PROPIONATE 1 SPRAY(S): 50 SPRAY, METERED NASAL at 21:09

## 2025-03-09 RX ADMIN — OLANZAPINE 20 MILLIGRAM(S): 10 TABLET ORAL at 21:10

## 2025-03-10 RX ORDER — OLANZAPINE 10 MG/1
405 TABLET ORAL ONCE
Refills: 0 | Status: COMPLETED | OUTPATIENT
Start: 2025-03-11 | End: 2025-03-11

## 2025-03-10 RX ADMIN — NICOTINE POLACRILEX 2 MILLIGRAM(S): 4 GUM, CHEWING ORAL at 08:43

## 2025-03-10 RX ADMIN — OLANZAPINE 5 MILLIGRAM(S): 10 TABLET ORAL at 08:42

## 2025-03-10 RX ADMIN — OLANZAPINE 20 MILLIGRAM(S): 10 TABLET ORAL at 21:05

## 2025-03-10 RX ADMIN — NICOTINE POLACRILEX 2 MILLIGRAM(S): 4 GUM, CHEWING ORAL at 17:56

## 2025-03-10 RX ADMIN — Medication 650 MILLIGRAM(S): at 21:05

## 2025-03-10 RX ADMIN — Medication 3 MILLIGRAM(S): at 21:06

## 2025-03-10 RX ADMIN — Medication 650 MILLIGRAM(S): at 22:05

## 2025-03-11 PROCEDURE — 99232 SBSQ HOSP IP/OBS MODERATE 35: CPT

## 2025-03-11 RX ADMIN — Medication 650 MILLIGRAM(S): at 20:41

## 2025-03-11 RX ADMIN — NICOTINE POLACRILEX 2 MILLIGRAM(S): 4 GUM, CHEWING ORAL at 08:20

## 2025-03-11 RX ADMIN — OLANZAPINE 20 MILLIGRAM(S): 10 TABLET ORAL at 20:26

## 2025-03-11 RX ADMIN — Medication 3 MILLIGRAM(S): at 20:26

## 2025-03-11 RX ADMIN — OLANZAPINE 5 MILLIGRAM(S): 10 TABLET ORAL at 08:20

## 2025-03-11 RX ADMIN — OLANZAPINE 405 MILLIGRAM(S): 10 TABLET ORAL at 12:50

## 2025-03-11 RX ADMIN — Medication 650 MILLIGRAM(S): at 21:40

## 2025-03-11 NOTE — BH INPATIENT PSYCHIATRY PROGRESS NOTE - NSBHFUPINTERVALHXFT_PSY_A_CORE
Patient seen and evaluated for follow up of psychosis and disorganization. Chart reviewed and case discussed with treatment team.  She denies SI/HI.  No agitation noted.  No somatic complaints.  Denies side effects.  She is engaged with the milieu and in groups.  She states she is feeling fine and denies residual paranoia.  I explain Zyprexa RelPrevv risks and benefits again including post dose sedation syndrome, risk of falls and drop in blood pressure which can be life threatening.  She understands and signs consent for injection.  We discuss her cousin Tamika's agreement to accompany her for follow up injection appts at our clinic and that projected discharge date will be Thursday.

## 2025-03-11 NOTE — BH INPATIENT PSYCHIATRY PROGRESS NOTE - NSBHASSESSSUMMFT_PSY_ALL_CORE
39/F, unemployed, , lives in nearby apartment with 2 sons (16 and 20), with reported hx of depression, anxiety and psychosis, chronically non adherent to meds with multiple psych admissions, per [facility], has multiple diagnoses to include: unspecified/Other Psychotic Disorders, Unspecified/Other Depressive Disorder, Adjustment Disorder, PTSD,  Schizophrenia, Delusional Disorder, Major Depressive Disorder, Other Mental Disorders, and Schizoaffective Disorder, presented to the ED BIB EMS activated by her "counselor" from "[facility] in [facility]" due to concern for SA (overdosed on tablets few days ago).    Pt's mood and sweta symptoms (pressured speech, disorganized thinking) have improved since admission. Pt does not have active or passive SI at this time, endorsed potential passive SI on admission of "wanting to sleep for a very long time." Will explore possibility of long-acting injectable medication with the pt to improve treatment adherence.     Patient with no overt paranoia, improved on exam, engaged in milieu.  Will transition to Zyprexa RelPrevv with family support confirmed.  405mg dose received today and will receive [id number] post discharge.     Plan:  Admitted involuntary status on 1N, no CO needed, routine checks  olanzapine 5 mg daily and 20 mg qhs  melatonin 3mg qhs    PRNs:  atarax 25mg q6 for anxiety  olanzapine 10mg q6 for agitation  flonase for nasal irritation

## 2025-03-11 NOTE — BH INPATIENT PSYCHIATRY PROGRESS NOTE - MSE UNSTRUCTURED FT
Appearance: patient appeared stated age wearing clothes, appropriate hygiene and grooming  Behavior: cooperative throughout interview  Eye contact: fair  Speech: Normal volume, rate, prosody  Mood: "fine"  Affect: euthymic affect, constricted range, congruent with mood  Thought Process:  linear  Thought Content: No apparent delusions. No active SI or HI.  Perception: Denies current AH or VH. Does not appear to be RIS.   Insight: fair  Judgement: emerging  Impulse Control: appropriate to the setting  Gait: normal

## 2025-03-12 PROCEDURE — 99231 SBSQ HOSP IP/OBS SF/LOW 25: CPT

## 2025-03-12 RX ORDER — OLANZAPINE 10 MG/1
1 TABLET ORAL
Qty: 12 | Refills: 0
Start: 2025-03-12 | End: 2025-03-23

## 2025-03-12 RX ORDER — MELATONIN 5 MG
1 TABLET ORAL
Qty: 14 | Refills: 0
Start: 2025-03-12 | End: 2025-03-25

## 2025-03-12 RX ORDER — NICOTINE POLACRILEX 4 MG/1
1 GUM, CHEWING ORAL
Qty: 4 | Refills: 0
Start: 2025-03-12 | End: 2025-03-21

## 2025-03-12 RX ORDER — OLANZAPINE 10 MG/1
405 TABLET ORAL
Qty: 0 | Refills: 0 | DISCHARGE

## 2025-03-12 RX ADMIN — HYDROXYZINE HYDROCHLORIDE 25 MILLIGRAM(S): 25 TABLET, FILM COATED ORAL at 22:14

## 2025-03-12 RX ADMIN — NICOTINE POLACRILEX 2 MILLIGRAM(S): 4 GUM, CHEWING ORAL at 11:25

## 2025-03-12 RX ADMIN — Medication 650 MILLIGRAM(S): at 19:30

## 2025-03-12 RX ADMIN — OLANZAPINE 20 MILLIGRAM(S): 10 TABLET ORAL at 20:31

## 2025-03-12 RX ADMIN — Medication 650 MILLIGRAM(S): at 18:17

## 2025-03-12 RX ADMIN — Medication 3 MILLIGRAM(S): at 20:31

## 2025-03-12 RX ADMIN — OLANZAPINE 5 MILLIGRAM(S): 10 TABLET ORAL at 10:02

## 2025-03-12 NOTE — BH INPATIENT PSYCHIATRY PROGRESS NOTE - NSTXDISORGGOAL_PSY_ALL_CORE
Will identify 2 coping skills that assist in organizing
Will identify 2 coping skills that assist in organizing
Will demonstrate the ability to maintain reality orientation and communicate clearly with others during 2 conversations with staff daily
Will demonstrate purposeful and predictable thoughts/behaviors by making a request
Will demonstrate the ability to maintain reality orientation and communicate clearly with others during 2 conversations with staff daily

## 2025-03-12 NOTE — BH INPATIENT PSYCHIATRY PROGRESS NOTE - MSE UNSTRUCTURED FT
Appearance: patient appeared stated age wearing clothes, appropriate hygiene and grooming  Behavior: cooperative throughout interview  Eye contact: fair  Speech: Normal volume, rate, prosody  Mood: "fine"  Affect: euthymic affect,  congruent with mood  Thought Process:  linear  Thought Content: No apparent delusions. No active SI or HI.  Perception: Denies current AH or VH. Does not appear to be RIS.   Insight: fair  Judgement: emerging  Impulse Control: appropriate to the setting  Gait: normal

## 2025-03-12 NOTE — BH INPATIENT PSYCHIATRY PROGRESS NOTE - NSBHLEGALSTATUSCHANGE_PSY_ALL_CORE
----- Message from Paget A Freyou, RN sent at 1/6/2022 11:45 AM CST -----  Patient is being discharged from hospital today and needs a follow up appointment in 1 week.   Please contact the patient to schedule appointment.     Thanks  Paget Freyou,RN    
Handled/scheduled on a previous message.   
No

## 2025-03-12 NOTE — BH INPATIENT PSYCHIATRY PROGRESS NOTE - NSTXDISORGINTERMD_PSY_ALL_CORE
med management, psychoed, therapy 

## 2025-03-12 NOTE — BH INPATIENT PSYCHIATRY PROGRESS NOTE - NSTXDEPRESPROGRES_PSY_ALL_CORE
Improving
No Change
Improving
No Change
Improving
No Change
No Change
Improving
No Change
No Change
Improving

## 2025-03-12 NOTE — BH INPATIENT PSYCHIATRY PROGRESS NOTE - NSTXDEPRESDATEEST_PSY_ALL_CORE
11-Feb-2025
19-Feb-2025
05-Mar-2025
11-Feb-2025
26-Feb-2025
11-Feb-2025
11-Mar-2025
26-Feb-2025
11-Feb-2025
26-Feb-2025
19-Feb-2025
26-Feb-2025
19-Feb-2025
12-Mar-2025
05-Mar-2025
05-Mar-2025
26-Feb-2025

## 2025-03-12 NOTE — BH INPATIENT PSYCHIATRY PROGRESS NOTE - NSTXSUICIDDATEEST_PSY_ALL_CORE
19-Feb-2025
11-Feb-2025
10-Feb-2025
10-Feb-2025
11-Feb-2025
26-Feb-2025
19-Feb-2025
26-Feb-2025
19-Feb-2025
26-Feb-2025
11-Feb-2025
11-Feb-2025
19-Feb-2025
11-Feb-2025
11-Feb-2025
05-Mar-2025
11-Feb-2025
05-Mar-2025
05-Mar-2025

## 2025-03-12 NOTE — BH INPATIENT PSYCHIATRY PROGRESS NOTE - NSBHATTESTTYPEVISIT_PSY_A_CORE
Attending Only
Attending with Resident/Fellow/Student
Attending with Resident/Fellow/Student
Attending Only
Attending with Resident/Fellow/Student
Attending Only
Attending Only

## 2025-03-12 NOTE — BH INPATIENT PSYCHIATRY PROGRESS NOTE - NSTXSUICIDGOAL_PSY_ALL_CORE
Will identify and utilize 2 coping skills
Will identify and utilize 2 coping skills
Will verbalize a decrease in preoccupation with suicidal thoughts and / or intent to commit suicide to 2 on a 10-point scale
Will identify and utilize 2 coping skills
Will verbalize a decrease in preoccupation with suicidal thoughts and / or intent to commit suicide to 2 on a 10-point scale
Will identify and utilize 2 coping skills
Will verbalize a decrease in preoccupation with suicidal thoughts and / or intent to commit suicide to 2 on a 10-point scale
Will identify and utilize 2 coping skills
Will verbalize a decrease in preoccupation with suicidal thoughts and / or intent to commit suicide to 2 on a 10-point scale
Will identify and utilize 2 coping skills
Will verbalize a decrease in preoccupation with suicidal thoughts and / or intent to commit suicide to 2 on a 10-point scale
Will verbalize a decrease in preoccupation with suicidal thoughts and / or intent to commit suicide to 2 on a 10-point scale
Will identify and utilize 2 coping skills
Will identify and utilize 2 coping skills
Will verbalize a decrease in preoccupation with suicidal thoughts and / or intent to commit suicide to 2 on a 10-point scale
Will identify and utilize 2 coping skills
Will verbalize a decrease in preoccupation with suicidal thoughts and / or intent to commit suicide to 2 on a 10-point scale

## 2025-03-12 NOTE — BH INPATIENT PSYCHIATRY PROGRESS NOTE - NSBHMETABOLIC_PSY_ALL_CORE_FT
BMI: BMI (kg/m2): 25.6 (03-01-25 @ 09:35)  HbA1c: A1C with Estimated Average Glucose Result: 5.0 % (02-10-25 @ 12:53)    Glucose:   BP: --Vital Signs Last 24 Hrs  T(C): 36.3 (03-12-25 @ 07:40), Max: 36.3 (03-12-25 @ 07:40)  T(F): 97.4 (03-12-25 @ 07:40), Max: 97.4 (03-12-25 @ 07:40)  HR: --  BP: --  BP(mean): --  RR: --  SpO2: --    Orthostatic VS  03-12-25 @ 07:40  Lying BP: --/-- HR: --  Sitting BP: 124/88 HR: 92  Standing BP: 108/74 HR: 97  Site: --  Mode: --  Orthostatic VS  03-11-25 @ 07:48  Lying BP: --/-- HR: --  Sitting BP: 132/62 HR: 82  Standing BP: 132/75 HR: 79  Site: --  Mode: --    Lipid Panel: Date/Time: 02-10-25 @ 12:53  Cholesterol, Serum: 142  LDL Cholesterol Calculated: 74  HDL Cholesterol, Serum: 58  Total Cholesterol/HDL Ration Measurement: --  Triglycerides, Serum: 46  
BMI: BMI (kg/m2): 22.3 (07-12-24 @ 16:04)  HbA1c: A1C with Estimated Average Glucose Result: 5.0 % (02-10-25 @ 12:53)    Glucose:   BP: 122/64 (02-11-25 @ 01:59) (112/59 - 157/69)Vital Signs Last 24 Hrs  T(C): 36.8 (02-13-25 @ 06:07), Max: 36.8 (02-13-25 @ 06:07)  T(F): 98.2 (02-13-25 @ 06:07), Max: 98.2 (02-13-25 @ 06:07)  HR: --  BP: --  BP(mean): --  RR: --  SpO2: --    Orthostatic VS  02-13-25 @ 06:07  Lying BP: --/-- HR: --  Sitting BP: 130/73 HR: 75  Standing BP: 138/78 HR: 73  Site: --  Mode: --  Orthostatic VS  02-12-25 @ 08:27  Lying BP: --/-- HR: --  Sitting BP: 128/67 HR: 67  Standing BP: 139/74 HR: 72  Site: --  Mode: --    Lipid Panel: Date/Time: 02-10-25 @ 12:53  Cholesterol, Serum: 142  LDL Cholesterol Calculated: 74  HDL Cholesterol, Serum: 58  Total Cholesterol/HDL Ration Measurement: --  Triglycerides, Serum: 46  
BMI: BMI (kg/m2): 22.3 (07-12-24 @ 16:04)  HbA1c: A1C with Estimated Average Glucose Result: 5.0 % (02-10-25 @ 12:53)    Glucose:   BP: --Vital Signs Last 24 Hrs  T(C): 36.8 (02-25-25 @ 05:56), Max: 36.8 (02-25-25 @ 05:56)  T(F): 98.2 (02-25-25 @ 05:56), Max: 98.2 (02-25-25 @ 05:56)  HR: --  BP: --  BP(mean): --  RR: --  SpO2: --    Orthostatic VS  02-25-25 @ 05:56  Lying BP: --/-- HR: --  Sitting BP: 134/64 HR: 77  Standing BP: 137/99 HR: 101  Site: --  Mode: --  Orthostatic VS  02-24-25 @ 06:30  Lying BP: --/-- HR: --  Sitting BP: 146/84 HR: 69  Standing BP: 155/90 HR: 76  Site: --  Mode: --    Lipid Panel: Date/Time: 02-10-25 @ 12:53  Cholesterol, Serum: 142  LDL Cholesterol Calculated: 74  HDL Cholesterol, Serum: 58  Total Cholesterol/HDL Ration Measurement: --  Triglycerides, Serum: 46  
BMI: BMI (kg/m2): 22.3 (07-12-24 @ 16:04)  HbA1c: A1C with Estimated Average Glucose Result: 5.0 % (02-10-25 @ 12:53)    Glucose:   BP: --Vital Signs Last 24 Hrs  T(C): 36.6 (02-27-25 @ 08:42), Max: 36.6 (02-27-25 @ 08:42)  T(F): 97.9 (02-27-25 @ 08:42), Max: 97.9 (02-27-25 @ 08:42)  HR: --  BP: --  BP(mean): --  RR: --  SpO2: --    Orthostatic VS  02-27-25 @ 08:42  Lying BP: --/-- HR: --  Sitting BP: 134/88 HR: 73  Standing BP: 150/78 HR: 85  Site: --  Mode: --  Orthostatic VS  02-26-25 @ 06:26  Lying BP: --/-- HR: --  Sitting BP: 137/65 HR: 75  Standing BP: 136/82 HR: 78  Site: upper left arm  Mode: electronic    Lipid Panel: Date/Time: 02-10-25 @ 12:53  Cholesterol, Serum: 142  LDL Cholesterol Calculated: 74  HDL Cholesterol, Serum: 58  Total Cholesterol/HDL Ration Measurement: --  Triglycerides, Serum: 46  
BMI: BMI (kg/m2): 22.3 (07-12-24 @ 16:04)  HbA1c: A1C with Estimated Average Glucose Result: 5.0 % (02-10-25 @ 12:53)    Glucose:   BP: --Vital Signs Last 24 Hrs  T(C): 35.8 (02-21-25 @ 07:12), Max: 35.8 (02-21-25 @ 07:12)  T(F): 96.4 (02-21-25 @ 07:12), Max: 96.4 (02-21-25 @ 07:12)  HR: --  BP: --  BP(mean): --  RR: --  SpO2: --    Orthostatic VS  02-21-25 @ 07:12  Lying BP: --/-- HR: --  Sitting BP: 136/59 HR: 66  Standing BP: 147/74 HR: 69  Site: --  Mode: --  Orthostatic VS  02-20-25 @ 06:10  Lying BP: --/-- HR: --  Sitting BP: 124/60 HR: 72  Standing BP: 137/72 HR: 92  Site: --  Mode: --    Lipid Panel: Date/Time: 02-10-25 @ 12:53  Cholesterol, Serum: 142  LDL Cholesterol Calculated: 74  HDL Cholesterol, Serum: 58  Total Cholesterol/HDL Ration Measurement: --  Triglycerides, Serum: 46  
BMI: BMI (kg/m2): 22.3 (07-12-24 @ 16:04)  HbA1c: A1C with Estimated Average Glucose Result: 5.0 % (02-10-25 @ 12:53)    Glucose:   BP: --Vital Signs Last 24 Hrs  T(C): 36.6 (02-26-25 @ 06:26), Max: 36.6 (02-26-25 @ 06:26)  T(F): 97.8 (02-26-25 @ 06:26), Max: 97.8 (02-26-25 @ 06:26)  HR: --  BP: --  BP(mean): --  RR: --  SpO2: --    Orthostatic VS  02-26-25 @ 06:26  Lying BP: --/-- HR: --  Sitting BP: 137/65 HR: 75  Standing BP: 136/82 HR: 78  Site: upper left arm  Mode: electronic  Orthostatic VS  02-25-25 @ 05:56  Lying BP: --/-- HR: --  Sitting BP: 134/64 HR: 77  Standing BP: 137/99 HR: 101  Site: --  Mode: --    Lipid Panel: Date/Time: 02-10-25 @ 12:53  Cholesterol, Serum: 142  LDL Cholesterol Calculated: 74  HDL Cholesterol, Serum: 58  Total Cholesterol/HDL Ration Measurement: --  Triglycerides, Serum: 46  
BMI: BMI (kg/m2): 22.3 (07-12-24 @ 16:04)  HbA1c: A1C with Estimated Average Glucose Result: 5.0 % (02-10-25 @ 12:53)    Glucose:   BP: --Vital Signs Last 24 Hrs  T(C): 36.8 (02-18-25 @ 06:02), Max: 36.8 (02-18-25 @ 06:02)  T(F): 98.2 (02-18-25 @ 06:02), Max: 98.2 (02-18-25 @ 06:02)  HR: --  BP: --  BP(mean): --  RR: --  SpO2: --    Orthostatic VS  02-18-25 @ 06:02  Lying BP: --/-- HR: --  Sitting BP: 128/63 HR: 75  Standing BP: 135/73 HR: 77  Site: --  Mode: --  Orthostatic VS  02-17-25 @ 06:14  Lying BP: --/-- HR: --  Sitting BP: 114/76 HR: 70  Standing BP: 124/73 HR: 72  Site: --  Mode: --    Lipid Panel: Date/Time: 02-10-25 @ 12:53  Cholesterol, Serum: 142  LDL Cholesterol Calculated: 74  HDL Cholesterol, Serum: 58  Total Cholesterol/HDL Ration Measurement: --  Triglycerides, Serum: 46  
BMI: BMI (kg/m2): 22.3 (07-12-24 @ 16:04)  HbA1c: A1C with Estimated Average Glucose Result: 5.0 % (02-10-25 @ 12:53)    Glucose:   BP: --Vital Signs Last 24 Hrs  T(C): 37.1 (02-17-25 @ 06:14), Max: 37.1 (02-17-25 @ 06:14)  T(F): 98.7 (02-17-25 @ 06:14), Max: 98.7 (02-17-25 @ 06:14)  HR: --  BP: --  BP(mean): --  RR: --  SpO2: --    Orthostatic VS  02-17-25 @ 06:14  Lying BP: --/-- HR: --  Sitting BP: 114/76 HR: 70  Standing BP: 124/73 HR: 72  Site: --  Mode: --  Orthostatic VS  02-16-25 @ 08:47  Lying BP: --/-- HR: --  Sitting BP: 120/64 HR: 66  Standing BP: 116/65 HR: 70  Site: --  Mode: --    Lipid Panel: Date/Time: 02-10-25 @ 12:53  Cholesterol, Serum: 142  LDL Cholesterol Calculated: 74  HDL Cholesterol, Serum: 58  Total Cholesterol/HDL Ration Measurement: --  Triglycerides, Serum: 46  
BMI: BMI (kg/m2): 22.3 (07-12-24 @ 16:04)  HbA1c: A1C with Estimated Average Glucose Result: 5.0 % (02-10-25 @ 12:53)    Glucose:   BP: 122/64 (02-11-25 @ 01:59) (112/59 - 157/69)Vital Signs Last 24 Hrs  T(C): 35.9 (02-12-25 @ 08:27), Max: 35.9 (02-12-25 @ 08:27)  T(F): 96.7 (02-12-25 @ 08:27), Max: 96.7 (02-12-25 @ 08:27)  HR: --  BP: --  BP(mean): --  RR: --  SpO2: --    Orthostatic VS  02-12-25 @ 08:27  Lying BP: --/-- HR: --  Sitting BP: 128/67 HR: 67  Standing BP: 139/74 HR: 72  Site: --  Mode: --  Orthostatic VS  02-11-25 @ 06:55  Lying BP: --/-- HR: --  Sitting BP: 122/53 HR: 76  Standing BP: 126/63 HR: 78  Site: --  Mode: --    Lipid Panel: Date/Time: 02-10-25 @ 12:53  Cholesterol, Serum: 142  LDL Cholesterol Calculated: 74  HDL Cholesterol, Serum: 58  Total Cholesterol/HDL Ration Measurement: --  Triglycerides, Serum: 46  
BMI: BMI (kg/m2): 25.6 (03-01-25 @ 09:35)  HbA1c: A1C with Estimated Average Glucose Result: 5.0 % (02-10-25 @ 12:53)    Glucose:   BP: --Vital Signs Last 24 Hrs  T(C): --  T(F): --  HR: --  BP: --  BP(mean): --  RR: --  SpO2: --    Orthostatic VS  03-06-25 @ 10:28  Lying BP: --/-- HR: --  Sitting BP: 137/76 HR: 74  Standing BP: 148/84 HR: 79  Site: --  Mode: --  Orthostatic VS  03-05-25 @ 07:14  Lying BP: --/-- HR: --  Sitting BP: 131/66 HR: 72  Standing BP: 134/85 HR: 78  Site: --  Mode: --    Lipid Panel: Date/Time: 02-10-25 @ 12:53  Cholesterol, Serum: 142  LDL Cholesterol Calculated: 74  HDL Cholesterol, Serum: 58  Total Cholesterol/HDL Ration Measurement: --  Triglycerides, Serum: 46  
BMI: BMI (kg/m2): 25.6 (03-01-25 @ 09:35)  HbA1c: A1C with Estimated Average Glucose Result: 5.0 % (02-10-25 @ 12:53)    Glucose:   BP: --Vital Signs Last 24 Hrs  T(C): 36.8 (03-05-25 @ 07:14), Max: 36.8 (03-05-25 @ 07:14)  T(F): 98.2 (03-05-25 @ 07:14), Max: 98.2 (03-05-25 @ 07:14)  HR: --  BP: --  BP(mean): --  RR: --  SpO2: --    Orthostatic VS  03-05-25 @ 07:14  Lying BP: --/-- HR: --  Sitting BP: 131/66 HR: 72  Standing BP: 134/85 HR: 78  Site: --  Mode: --  Orthostatic VS  03-04-25 @ 06:53  Lying BP: --/-- HR: --  Sitting BP: 128/62 HR: 83  Standing BP: 131/81 HR: 83  Site: --  Mode: --    Lipid Panel: Date/Time: 02-10-25 @ 12:53  Cholesterol, Serum: 142  LDL Cholesterol Calculated: 74  HDL Cholesterol, Serum: 58  Total Cholesterol/HDL Ration Measurement: --  Triglycerides, Serum: 46  
BMI: BMI (kg/m2): 22.3 (07-12-24 @ 16:04)  HbA1c: A1C with Estimated Average Glucose Result: 5.0 % (02-10-25 @ 12:53)    Glucose:   BP: --Vital Signs Last 24 Hrs  T(C): 36.9 (02-20-25 @ 06:10), Max: 36.9 (02-20-25 @ 06:10)  T(F): 98.5 (02-20-25 @ 06:10), Max: 98.5 (02-20-25 @ 06:10)  HR: --  BP: --  BP(mean): --  RR: --  SpO2: --    Orthostatic VS  02-20-25 @ 06:10  Lying BP: --/-- HR: --  Sitting BP: 124/60 HR: 72  Standing BP: 137/72 HR: 92  Site: --  Mode: --  Orthostatic VS  02-19-25 @ 06:18  Lying BP: --/-- HR: --  Sitting BP: 120/69 HR: 72  Standing BP: 126/76 HR: 77  Site: --  Mode: --    Lipid Panel: Date/Time: 02-10-25 @ 12:53  Cholesterol, Serum: 142  LDL Cholesterol Calculated: 74  HDL Cholesterol, Serum: 58  Total Cholesterol/HDL Ration Measurement: --  Triglycerides, Serum: 46  
BMI: BMI (kg/m2): 22.3 (07-12-24 @ 16:04)  HbA1c: A1C with Estimated Average Glucose Result: 5.0 % (02-10-25 @ 12:53)    Glucose:   BP: --Vital Signs Last 24 Hrs  T(C): 36.7 (02-14-25 @ 08:41), Max: 36.7 (02-14-25 @ 08:41)  T(F): 98 (02-14-25 @ 08:41), Max: 98 (02-14-25 @ 08:41)  HR: --  BP: --  BP(mean): --  RR: --  SpO2: --    Orthostatic VS  02-14-25 @ 08:41  Lying BP: --/-- HR: --  Sitting BP: 117/84 HR: 71  Standing BP: 132/69 HR: 73  Site: --  Mode: --  Orthostatic VS  02-13-25 @ 06:07  Lying BP: --/-- HR: --  Sitting BP: 130/73 HR: 75  Standing BP: 138/78 HR: 73  Site: --  Mode: --    Lipid Panel: Date/Time: 02-10-25 @ 12:53  Cholesterol, Serum: 142  LDL Cholesterol Calculated: 74  HDL Cholesterol, Serum: 58  Total Cholesterol/HDL Ration Measurement: --  Triglycerides, Serum: 46  
BMI: BMI (kg/m2): 25.6 (03-01-25 @ 09:35)  HbA1c: A1C with Estimated Average Glucose Result: 5.0 % (02-10-25 @ 12:53)    Glucose:   BP: --Vital Signs Last 24 Hrs  T(C): 36.8 (03-03-25 @ 07:49), Max: 36.8 (03-03-25 @ 07:49)  T(F): 98.2 (03-03-25 @ 07:49), Max: 98.2 (03-03-25 @ 07:49)  HR: --  BP: --  BP(mean): --  RR: --  SpO2: --    Orthostatic VS  03-03-25 @ 07:49  Lying BP: --/-- HR: --  Sitting BP: 122/88 HR: 77  Standing BP: 125/75 HR: 79  Site: --  Mode: --  Orthostatic VS  03-02-25 @ 07:53  Lying BP: --/-- HR: --  Sitting BP: 132/72 HR: 68  Standing BP: 133/77 HR: 76  Site: --  Mode: --    Lipid Panel: Date/Time: 02-10-25 @ 12:53  Cholesterol, Serum: 142  LDL Cholesterol Calculated: 74  HDL Cholesterol, Serum: 58  Total Cholesterol/HDL Ration Measurement: --  Triglycerides, Serum: 46  
BMI: BMI (kg/m2): 22.3 (07-12-24 @ 16:04)  HbA1c: A1C with Estimated Average Glucose Result: 5.0 % (02-10-25 @ 12:53)    Glucose:   BP: --Vital Signs Last 24 Hrs  T(C): 36.1 (02-19-25 @ 06:18), Max: 36.1 (02-19-25 @ 06:18)  T(F): 96.9 (02-19-25 @ 06:18), Max: 96.9 (02-19-25 @ 06:18)  HR: --  BP: --  BP(mean): --  RR: --  SpO2: --    Orthostatic VS  02-19-25 @ 06:18  Lying BP: --/-- HR: --  Sitting BP: 120/69 HR: 72  Standing BP: 126/76 HR: 77  Site: --  Mode: --  Orthostatic VS  02-18-25 @ 06:02  Lying BP: --/-- HR: --  Sitting BP: 128/63 HR: 75  Standing BP: 135/73 HR: 77  Site: --  Mode: --    Lipid Panel: Date/Time: 02-10-25 @ 12:53  Cholesterol, Serum: 142  LDL Cholesterol Calculated: 74  HDL Cholesterol, Serum: 58  Total Cholesterol/HDL Ration Measurement: --  Triglycerides, Serum: 46  
BMI: BMI (kg/m2): 25.6 (03-01-25 @ 09:35)  HbA1c: A1C with Estimated Average Glucose Result: 5.0 % (02-10-25 @ 12:53)    Glucose:   BP: --Vital Signs Last 24 Hrs  T(C): 36.2 (03-10-25 @ 06:39), Max: 36.2 (03-10-25 @ 06:39)  T(F): 97.2 (03-10-25 @ 06:39), Max: 97.2 (03-10-25 @ 06:39)  HR: --  BP: --  BP(mean): --  RR: --  SpO2: --    Orthostatic VS  03-10-25 @ 06:39  Lying BP: --/-- HR: --  Sitting BP: 135/88 HR: 74  Standing BP: 145/86 HR: 73  Site: --  Mode: --  Orthostatic VS  03-09-25 @ 07:46  Lying BP: --/-- HR: --  Sitting BP: 145/76 HR: 77  Standing BP: 136/84 HR: 83  Site: --  Mode: --    Lipid Panel: Date/Time: 02-10-25 @ 12:53  Cholesterol, Serum: 142  LDL Cholesterol Calculated: 74  HDL Cholesterol, Serum: 58  Total Cholesterol/HDL Ration Measurement: --  Triglycerides, Serum: 46  
BMI: BMI (kg/m2): 22.3 (07-12-24 @ 16:04)  HbA1c: A1C with Estimated Average Glucose Result: 5.0 % (02-10-25 @ 12:53)    Glucose:   BP: --Vital Signs Last 24 Hrs  T(C): 36.5 (02-24-25 @ 06:30), Max: 36.5 (02-24-25 @ 06:30)  T(F): 97.7 (02-24-25 @ 06:30), Max: 97.7 (02-24-25 @ 06:30)  HR: --  BP: --  BP(mean): --  RR: --  SpO2: --    Orthostatic VS  02-24-25 @ 06:30  Lying BP: --/-- HR: --  Sitting BP: 146/84 HR: 69  Standing BP: 155/90 HR: 76  Site: --  Mode: --    Lipid Panel: Date/Time: 02-10-25 @ 12:53  Cholesterol, Serum: 142  LDL Cholesterol Calculated: 74  HDL Cholesterol, Serum: 58  Total Cholesterol/HDL Ration Measurement: --  Triglycerides, Serum: 46  
BMI: BMI (kg/m2): 22.3 (07-12-24 @ 16:04)  HbA1c: A1C with Estimated Average Glucose Result: 5.0 % (02-10-25 @ 12:53)    Glucose:   BP: --Vital Signs Last 24 Hrs  T(C): 36.9 (02-15-25 @ 06:06), Max: 36.9 (02-15-25 @ 06:06)  T(F): 98.4 (02-15-25 @ 06:06), Max: 98.4 (02-15-25 @ 06:06)  HR: --  BP: --  BP(mean): --  RR: --  SpO2: --    Orthostatic VS  02-15-25 @ 06:06  Lying BP: --/-- HR: --  Sitting BP: 142/78 HR: 77  Standing BP: 143/80 HR: 78  Site: --  Mode: --  Orthostatic VS  02-14-25 @ 08:41  Lying BP: --/-- HR: --  Sitting BP: 117/84 HR: 71  Standing BP: 132/69 HR: 73  Site: --  Mode: --    Lipid Panel: Date/Time: 02-10-25 @ 12:53  Cholesterol, Serum: 142  LDL Cholesterol Calculated: 74  HDL Cholesterol, Serum: 58  Total Cholesterol/HDL Ration Measurement: --  Triglycerides, Serum: 46  
BMI: BMI (kg/m2): 22.3 (07-12-24 @ 16:04)  HbA1c: A1C with Estimated Average Glucose Result: 5.0 % (02-10-25 @ 12:53)    Glucose:   BP: --Vital Signs Last 24 Hrs  T(C): 36.6 (02-28-25 @ 06:27), Max: 36.6 (02-28-25 @ 06:27)  T(F): 97.8 (02-28-25 @ 06:27), Max: 97.8 (02-28-25 @ 06:27)  HR: --  BP: --  BP(mean): --  RR: --  SpO2: --    Orthostatic VS  02-28-25 @ 06:27  Lying BP: --/-- HR: --  Sitting BP: 148/78 HR: 68  Standing BP: 143/71 HR: 82  Site: --  Mode: --  Orthostatic VS  02-27-25 @ 08:42  Lying BP: --/-- HR: --  Sitting BP: 134/88 HR: 73  Standing BP: 150/78 HR: 85  Site: --  Mode: --    Lipid Panel: Date/Time: 02-10-25 @ 12:53  Cholesterol, Serum: 142  LDL Cholesterol Calculated: 74  HDL Cholesterol, Serum: 58  Total Cholesterol/HDL Ration Measurement: --  Triglycerides, Serum: 46  
BMI: BMI (kg/m2): 25.6 (03-01-25 @ 09:35)  HbA1c: A1C with Estimated Average Glucose Result: 5.0 % (02-10-25 @ 12:53)    Glucose:   BP: --Vital Signs Last 24 Hrs  T(C): 36.4 (03-04-25 @ 06:53), Max: 36.4 (03-04-25 @ 06:53)  T(F): 97.6 (03-04-25 @ 06:53), Max: 97.6 (03-04-25 @ 06:53)  HR: --  BP: --  BP(mean): --  RR: --  SpO2: --    Orthostatic VS  03-04-25 @ 06:53  Lying BP: --/-- HR: --  Sitting BP: 128/62 HR: 83  Standing BP: 131/81 HR: 83  Site: --  Mode: --  Orthostatic VS  03-03-25 @ 07:49  Lying BP: --/-- HR: --  Sitting BP: 122/88 HR: 77  Standing BP: 125/75 HR: 79  Site: --  Mode: --    Lipid Panel: Date/Time: 02-10-25 @ 12:53  Cholesterol, Serum: 142  LDL Cholesterol Calculated: 74  HDL Cholesterol, Serum: 58  Total Cholesterol/HDL Ration Measurement: --  Triglycerides, Serum: 46  
BMI: BMI (kg/m2): 25.6 (03-01-25 @ 09:35)  HbA1c: A1C with Estimated Average Glucose Result: 5.0 % (02-10-25 @ 12:53)    Glucose:   BP: --Vital Signs Last 24 Hrs  T(C): 36.3 (03-11-25 @ 07:48), Max: 36.3 (03-11-25 @ 07:48)  T(F): 97.4 (03-11-25 @ 07:48), Max: 97.4 (03-11-25 @ 07:48)  HR: --  BP: --  BP(mean): --  RR: --  SpO2: --    Orthostatic VS  03-11-25 @ 07:48  Lying BP: --/-- HR: --  Sitting BP: 132/62 HR: 82  Standing BP: 132/75 HR: 79  Site: --  Mode: --  Orthostatic VS  03-10-25 @ 06:39  Lying BP: --/-- HR: --  Sitting BP: 135/88 HR: 74  Standing BP: 145/86 HR: 73  Site: --  Mode: --    Lipid Panel: Date/Time: 02-10-25 @ 12:53  Cholesterol, Serum: 142  LDL Cholesterol Calculated: 74  HDL Cholesterol, Serum: 58  Total Cholesterol/HDL Ration Measurement: --  Triglycerides, Serum: 46

## 2025-03-12 NOTE — BH INPATIENT PSYCHIATRY PROGRESS NOTE - NSBHFUPINTERVALHXFT_PSY_A_CORE
Patient seen and evaluated for follow up of psychosis and disorganization. Chart reviewed and case discussed with treatment team.  She denies SI/HI.  No agitation noted.  No somatic complaints.  Denies side effects.  She is engaged with the milieu and in groups.  She states she is feeling fine and denies residual paranoia.  States she tolerated RelPRevv well without significant sedation.  Looks forward to discharge tomorrow and feels motivated for follow up care.

## 2025-03-12 NOTE — BH INPATIENT PSYCHIATRY PROGRESS NOTE - NSTXDCFAMINTERMD_PSY_ALL_CORE
Psycho education.

## 2025-03-12 NOTE — BH DISCHARGE NOTE NURSING/SOCIAL WORK/PSYCH REHAB - NSDCPEWEB_GEN_ALL_CORE
[facility] for Tobacco Control website --- [email or web address] website --- [email or web address]

## 2025-03-12 NOTE — BH INPATIENT PSYCHIATRY PROGRESS NOTE - NSBHATTESTBILLING_PSY_A_CORE
[id number]-Vhirsddpqz OBS or IP - low complexity OR 25-34 mins
[id number]-Qbqxulsgvu OBS or IP - low complexity OR 25-34 mins
[id number]-Iixqlltqsc OBS or IP - low complexity OR 25-34 mins
[id number]-Itusgrfcwt OBS or IP - moderate complexity OR 35-49 mins
[id number]-Pdzhxkbwou OBS or IP - low complexity OR 25-34 mins
[id number]-Gqweghyitt OBS or IP - moderate complexity OR 35-49 mins
[id number]-Gnmwylcvez OBS or IP - moderate complexity OR 35-49 mins
[id number]-Pplcgrzgea OBS or IP - low complexity OR 25-34 mins
[id number]-Dzafwrzavt OBS or IP - low complexity OR 25-34 mins
97283-Ltwimxlsjx OBS or IP - low complexity OR 25-34 mins
03851-Qlihlexmbx OBS or IP - low complexity OR 25-34 mins
78361-Mtwdwmluvv OBS or IP - low complexity OR 25-34 mins
03108-Mhxvxopwuw OBS or IP - low complexity OR 25-34 mins
50281-Cqnejdflwb OBS or IP - low complexity OR 25-34 mins
81854-Wooqqqxsep OBS or IP - moderate complexity OR 35-49 mins
75086-Slkpdtebne OBS or IP - low complexity OR 25-34 mins
67877-Dhdozgwocm OBS or IP - low complexity OR 25-34 mins
17245-Taqyshatua OBS or IP - low complexity OR 25-34 mins
84645-Vkfsrjktil OBS or IP - low complexity OR 25-34 mins
06212-Foygqemrbl OBS or IP - low complexity OR 25-34 mins
79435-Offjngtufu OBS or IP - moderate complexity OR 35-49 mins

## 2025-03-12 NOTE — DIETITIAN INITIAL EVALUATION ADULT - ADD RECOMMEND
(E4) spontaneous - Encourage diet adherence and healthy food choices.  - Monitor PO intake/tolerance, weights, labs, BM's, and skin integrity.

## 2025-03-12 NOTE — BH INPATIENT PSYCHIATRY PROGRESS NOTE - NSTXPROBVIOLNT_PSY_ALL_CORE
90 day refill given on 10/30/19, appointment needed for further refills. CSS, please call pt and schedule f/u appt.
VIOLENT/AGGRESSIVE BEHAVIOR

## 2025-03-12 NOTE — BH INPATIENT PSYCHIATRY PROGRESS NOTE - NSTXDCOPNODATEEST_PSY_ALL_CORE
11-Feb-2025
12-Feb-2025
11-Feb-2025
20-Feb-2025
11-Feb-2025
12-Feb-2025
11-Feb-2025
11-Feb-2025
25-Feb-2025
11-Feb-2025
11-Feb-2025
12-Feb-2025
25-Feb-2025
12-Feb-2025
20-Feb-2025
25-Feb-2025

## 2025-03-12 NOTE — BH INPATIENT PSYCHIATRY PROGRESS NOTE - NSBHFUPINTERVALCCFT_PSY_A_CORE
psychosis
"I think I have the right number for my son nowd!"
"I am interested in leaving soon!!"
"I am not 39 I ma 38 years old!"
psychosis
"I want you to contact my son but his number is disconnected!"
"I think I have the right number for my son nowd!"
psychosis
f/u SA
psychosis
f/u SA
f/u SA
psychosis
"I want you to contact my son but his number is disconnected!"
"I am interested in leaving soon!!"
psychosis
"I don't need any of these women in my life"
"I want you to contact my son but his number is disconnected!"
psychosis
"Tomorrow is my younger son's birthday and I wish I could be with him!"
psychosis

## 2025-03-12 NOTE — BH DISCHARGE NOTE NURSING/SOCIAL WORK/PSYCH REHAB - NSDCPEEMAIL_GEN_ALL_CORE
[facility] email [email or web address]

## 2025-03-12 NOTE — BH INPATIENT PSYCHIATRY PROGRESS NOTE - NSBHCONTPROVIDER_PSY_ALL_CORE
No...

## 2025-03-12 NOTE — BH INPATIENT PSYCHIATRY PROGRESS NOTE - NSBHASSESSSUMMFT_PSY_ALL_CORE
39/F, unemployed, , lives in nearby apartment with 2 sons (16 and 20), with reported hx of depression, anxiety and psychosis, chronically non adherent to meds with multiple psych admissions, per PSYCKES, has multiple diagnoses to include: unspecified/Other Psychotic Disorders, Unspecified/Other Depressive Disorder, Adjustment Disorder, PTSD,  Schizophrenia, Delusional Disorder, Major Depressive Disorder, Other Mental Disorders, and Schizoaffective Disorder, presented to the ED BIB EMS activated by her "counselor" from "[facility] in [facility]" due to concern for SA (overdosed on tablets few days ago).    Pt's mood and sweta symptoms (pressured speech, disorganized thinking) have improved since admission. Pt does not have active or passive SI at this time, endorsed potential passive SI on admission of "wanting to sleep for a very long time." Will explore possibility of long-acting injectable medication with the pt to improve treatment adherence.     Patient with no overt paranoia, improved on exam, engaged in milieu.  Will transition to Zyprexa RelPrevv with family support confirmed.  405mg dose received 3/11 and will receive q2wepdb post discharge.     Plan:  Admitted involuntary status on 1N, no CO needed, routine checks  olanzapine 5 mg daily and 20 mg qhs for oral overlap after HUGHES  melatonin 3mg qhs    PRNs:  atarax 25mg q6 for anxiety  olanzapine 10mg q6 for agitation  flonase for nasal irritation

## 2025-03-12 NOTE — BH INPATIENT PSYCHIATRY PROGRESS NOTE - NSTXDEPRESDATETRGT_PSY_ALL_CORE
18-Feb-2025
26-Feb-2025
26-Feb-2025
18-Feb-2025
05-Mar-2025
05-Mar-2025
18-Feb-2025
05-Mar-2025
26-Feb-2025
18-Feb-2025
26-Feb-2025
26-Feb-2025
18-Feb-2025
05-Mar-2025
18-Feb-2025
12-Mar-2025
19-Mar-2025
12-Mar-2025
17-Mar-2025
12-Mar-2025
05-Mar-2025

## 2025-03-12 NOTE — BH INPATIENT PSYCHIATRY PROGRESS NOTE - NSTXVIOLNTINTERMD_PSY_ALL_CORE
med management, psychoed, therapy.

## 2025-03-12 NOTE — BH DISCHARGE NOTE NURSING/SOCIAL WORK/PSYCH REHAB - NSBHDCADDR2FT_A_CORE
[id number] [place] [address]. Best entrance is on [address] [place]. Please arrive 20 minutes early to register and bring photo ID and insurance cards.

## 2025-03-12 NOTE — BH INPATIENT PSYCHIATRY PROGRESS NOTE - NSTXSUICIDPROGRES_PSY_ALL_CORE
Improving
No Change
Improving
No Change
Improving
No Change
Improving
No Change
Improving
Improving
No Change
Improving

## 2025-03-12 NOTE — DIETITIAN INITIAL EVALUATION ADULT - OBTAIN WEEKLY WEIGHT
Caller: Luisa Sepulveda    Relationship to patient: Self    Best call back number: [phone]     Patient is needing: PT CALLED TO SETUP A TIME TO COME IN AND GET A SHOT FOR HER HIP, UNABLE TO WARM TRANSFER, PLEASE CALL BACK AND ADVISE, THANK YOU.              
yes

## 2025-03-12 NOTE — BH DISCHARGE NOTE NURSING/SOCIAL WORK/PSYCH REHAB - PATIENT PORTAL LINK FT
You can access the FollowMyHealth Patient Portal offered by [facility] by registering at the following website: [email or web address]. By joining [facility]’s FollowMyHealth portal, you will also be able to view your health information using other applications (apps) compatible with our system.

## 2025-03-12 NOTE — BH DISCHARGE NOTE NURSING/SOCIAL WORK/PSYCH REHAB - NSBHDCADDR1FT_A_CORE
[address] [place] [address]. Best entrance is on [address] [place]. [address] [place] [address]. Best entrance is on [address] [place].[facility]

## 2025-03-12 NOTE — BH INPATIENT PSYCHIATRY PROGRESS NOTE - NSTXDCOPNOPROGRES_PSY_ALL_CORE
Improving
No Change
No Change
Improving
No Change
Improving
Improving
No Change
No Change
Improving

## 2025-03-12 NOTE — BH INPATIENT PSYCHIATRY PROGRESS NOTE - NSBHCHARTREVIEWVS_PSY_A_CORE FT
Vital Signs Last 24 Hrs  T(C): 37.1 (02-17-25 @ 06:14), Max: 37.1 (02-17-25 @ 06:14)  T(F): 98.7 (02-17-25 @ 06:14), Max: 98.7 (02-17-25 @ 06:14)  HR: --  BP: --  BP(mean): --  RR: --  SpO2: --    Orthostatic VS  02-17-25 @ 06:14  Lying BP: --/-- HR: --  Sitting BP: 114/76 HR: 70  Standing BP: 124/73 HR: 72  Site: --  Mode: --  Orthostatic VS  02-16-25 @ 08:47  Lying BP: --/-- HR: --  Sitting BP: 120/64 HR: 66  Standing BP: 116/65 HR: 70  Site: --  Mode: --  
Vital Signs Last 24 Hrs  T(C): 36.4 (03-04-25 @ 06:53), Max: 36.4 (03-04-25 @ 06:53)  T(F): 97.6 (03-04-25 @ 06:53), Max: 97.6 (03-04-25 @ 06:53)  HR: --  BP: --  BP(mean): --  RR: --  SpO2: --    Orthostatic VS  03-04-25 @ 06:53  Lying BP: --/-- HR: --  Sitting BP: 128/62 HR: 83  Standing BP: 131/81 HR: 83  Site: --  Mode: --  Orthostatic VS  03-03-25 @ 07:49  Lying BP: --/-- HR: --  Sitting BP: 122/88 HR: 77  Standing BP: 125/75 HR: 79  Site: --  Mode: --  
Vital Signs Last 24 Hrs  T(C): 36.6 (02-28-25 @ 06:27), Max: 36.6 (02-28-25 @ 06:27)  T(F): 97.8 (02-28-25 @ 06:27), Max: 97.8 (02-28-25 @ 06:27)  HR: --  BP: --  BP(mean): --  RR: --  SpO2: --    Orthostatic VS  02-28-25 @ 06:27  Lying BP: --/-- HR: --  Sitting BP: 148/78 HR: 68  Standing BP: 143/71 HR: 82  Site: --  Mode: --  Orthostatic VS  02-27-25 @ 08:42  Lying BP: --/-- HR: --  Sitting BP: 134/88 HR: 73  Standing BP: 150/78 HR: 85  Site: --  Mode: --  
Vital Signs Last 24 Hrs  T(C): 36.8 (02-13-25 @ 06:07), Max: 36.8 (02-13-25 @ 06:07)  T(F): 98.2 (02-13-25 @ 06:07), Max: 98.2 (02-13-25 @ 06:07)  HR: --  BP: --  BP(mean): --  RR: --  SpO2: --    Orthostatic VS  02-13-25 @ 06:07  Lying BP: --/-- HR: --  Sitting BP: 130/73 HR: 75  Standing BP: 138/78 HR: 73  Site: --  Mode: --  Orthostatic VS  02-12-25 @ 08:27  Lying BP: --/-- HR: --  Sitting BP: 128/67 HR: 67  Standing BP: 139/74 HR: 72  Site: --  Mode: --  
Vital Signs Last 24 Hrs  T(C): 36.7 (02-14-25 @ 08:41), Max: 36.7 (02-14-25 @ 08:41)  T(F): 98 (02-14-25 @ 08:41), Max: 98 (02-14-25 @ 08:41)  HR: --  BP: --  BP(mean): --  RR: --  SpO2: --    Orthostatic VS  02-14-25 @ 08:41  Lying BP: --/-- HR: --  Sitting BP: 117/84 HR: 71  Standing BP: 132/69 HR: 73  Site: --  Mode: --  Orthostatic VS  02-13-25 @ 06:07  Lying BP: --/-- HR: --  Sitting BP: 130/73 HR: 75  Standing BP: 138/78 HR: 73  Site: --  Mode: --  
Vital Signs Last 24 Hrs  T(C): 36.2 (03-10-25 @ 06:39), Max: 36.2 (03-10-25 @ 06:39)  T(F): 97.2 (03-10-25 @ 06:39), Max: 97.2 (03-10-25 @ 06:39)  HR: --  BP: --  BP(mean): --  RR: --  SpO2: --    Orthostatic VS  03-10-25 @ 06:39  Lying BP: --/-- HR: --  Sitting BP: 135/88 HR: 74  Standing BP: 145/86 HR: 73  Site: --  Mode: --  Orthostatic VS  03-09-25 @ 07:46  Lying BP: --/-- HR: --  Sitting BP: 145/76 HR: 77  Standing BP: 136/84 HR: 83  Site: --  Mode: --  
Vital Signs Last 24 Hrs  T(C): 36.9 (02-20-25 @ 06:10), Max: 36.9 (02-20-25 @ 06:10)  T(F): 98.5 (02-20-25 @ 06:10), Max: 98.5 (02-20-25 @ 06:10)  HR: --  BP: --  BP(mean): --  RR: --  SpO2: --    Orthostatic VS  02-20-25 @ 06:10  Lying BP: --/-- HR: --  Sitting BP: 124/60 HR: 72  Standing BP: 137/72 HR: 92  Site: --  Mode: --  Orthostatic VS  02-19-25 @ 06:18  Lying BP: --/-- HR: --  Sitting BP: 120/69 HR: 72  Standing BP: 126/76 HR: 77  Site: --  Mode: --  
Vital Signs Last 24 Hrs  T(C): 35.8 (02-21-25 @ 07:12), Max: 35.8 (02-21-25 @ 07:12)  T(F): 96.4 (02-21-25 @ 07:12), Max: 96.4 (02-21-25 @ 07:12)  HR: --  BP: --  BP(mean): --  RR: --  SpO2: --    Orthostatic VS  02-21-25 @ 07:12  Lying BP: --/-- HR: --  Sitting BP: 136/59 HR: 66  Standing BP: 147/74 HR: 69  Site: --  Mode: --  Orthostatic VS  02-20-25 @ 06:10  Lying BP: --/-- HR: --  Sitting BP: 124/60 HR: 72  Standing BP: 137/72 HR: 92  Site: --  Mode: --  
Vital Signs Last 24 Hrs  T(C): 36.3 (03-12-25 @ 07:40), Max: 36.3 (03-12-25 @ 07:40)  T(F): 97.4 (03-12-25 @ 07:40), Max: 97.4 (03-12-25 @ 07:40)  HR: --  BP: --  BP(mean): --  RR: --  SpO2: --    Orthostatic VS  03-12-25 @ 07:40  Lying BP: --/-- HR: --  Sitting BP: 124/88 HR: 92  Standing BP: 108/74 HR: 97  Site: --  Mode: --  Orthostatic VS  03-11-25 @ 07:48  Lying BP: --/-- HR: --  Sitting BP: 132/62 HR: 82  Standing BP: 132/75 HR: 79  Site: --  Mode: --  
Vital Signs Last 24 Hrs  T(C): 36.6 (02-27-25 @ 08:42), Max: 36.6 (02-27-25 @ 08:42)  T(F): 97.9 (02-27-25 @ 08:42), Max: 97.9 (02-27-25 @ 08:42)  HR: --  BP: --  BP(mean): --  RR: --  SpO2: --    Orthostatic VS  02-27-25 @ 08:42  Lying BP: --/-- HR: --  Sitting BP: 134/88 HR: 73  Standing BP: 150/78 HR: 85  Site: --  Mode: --  Orthostatic VS  02-26-25 @ 06:26  Lying BP: --/-- HR: --  Sitting BP: 137/65 HR: 75  Standing BP: 136/82 HR: 78  Site: upper left arm  Mode: electronic  
Vital Signs Last 24 Hrs  T(C): 36.1 (02-19-25 @ 06:18), Max: 36.1 (02-19-25 @ 06:18)  T(F): 96.9 (02-19-25 @ 06:18), Max: 96.9 (02-19-25 @ 06:18)  HR: --  BP: --  BP(mean): --  RR: --  SpO2: --    Orthostatic VS  02-19-25 @ 06:18  Lying BP: --/-- HR: --  Sitting BP: 120/69 HR: 72  Standing BP: 126/76 HR: 77  Site: --  Mode: --  Orthostatic VS  02-18-25 @ 06:02  Lying BP: --/-- HR: --  Sitting BP: 128/63 HR: 75  Standing BP: 135/73 HR: 77  Site: --  Mode: --  
Vital Signs Last 24 Hrs  T(C): 35.9 (02-12-25 @ 08:27), Max: 35.9 (02-12-25 @ 08:27)  T(F): 96.7 (02-12-25 @ 08:27), Max: 96.7 (02-12-25 @ 08:27)  HR: --  BP: --  BP(mean): --  RR: --  SpO2: --    Orthostatic VS  02-12-25 @ 08:27  Lying BP: --/-- HR: --  Sitting BP: 128/67 HR: 67  Standing BP: 139/74 HR: 72  Site: --  Mode: --  Orthostatic VS  02-11-25 @ 06:55  Lying BP: --/-- HR: --  Sitting BP: 122/53 HR: 76  Standing BP: 126/63 HR: 78  Site: --  Mode: --  
Vital Signs Last 24 Hrs  T(C): 36.5 (02-24-25 @ 06:30), Max: 36.5 (02-24-25 @ 06:30)  T(F): 97.7 (02-24-25 @ 06:30), Max: 97.7 (02-24-25 @ 06:30)  HR: --  BP: --  BP(mean): --  RR: --  SpO2: --    Orthostatic VS  02-24-25 @ 06:30  Lying BP: --/-- HR: --  Sitting BP: 146/84 HR: 69  Standing BP: 155/90 HR: 76  Site: --  Mode: --  
Vital Signs Last 24 Hrs  T(C): 36.8 (02-25-25 @ 05:56), Max: 36.8 (02-25-25 @ 05:56)  T(F): 98.2 (02-25-25 @ 05:56), Max: 98.2 (02-25-25 @ 05:56)  HR: --  BP: --  BP(mean): --  RR: --  SpO2: --    Orthostatic VS  02-25-25 @ 05:56  Lying BP: --/-- HR: --  Sitting BP: 134/64 HR: 77  Standing BP: 137/99 HR: 101  Site: --  Mode: --  Orthostatic VS  02-24-25 @ 06:30  Lying BP: --/-- HR: --  Sitting BP: 146/84 HR: 69  Standing BP: 155/90 HR: 76  Site: --  Mode: --  
Vital Signs Last 24 Hrs  T(C): 36.8 (02-18-25 @ 06:02), Max: 36.8 (02-18-25 @ 06:02)  T(F): 98.2 (02-18-25 @ 06:02), Max: 98.2 (02-18-25 @ 06:02)  HR: --  BP: --  BP(mean): --  RR: --  SpO2: --    Orthostatic VS  02-18-25 @ 06:02  Lying BP: --/-- HR: --  Sitting BP: 128/63 HR: 75  Standing BP: 135/73 HR: 77  Site: --  Mode: --  Orthostatic VS  02-17-25 @ 06:14  Lying BP: --/-- HR: --  Sitting BP: 114/76 HR: 70  Standing BP: 124/73 HR: 72  Site: --  Mode: --  
Vital Signs Last 24 Hrs  T(C): 36.6 (02-26-25 @ 06:26), Max: 36.6 (02-26-25 @ 06:26)  T(F): 97.8 (02-26-25 @ 06:26), Max: 97.8 (02-26-25 @ 06:26)  HR: --  BP: --  BP(mean): --  RR: --  SpO2: --    Orthostatic VS  02-26-25 @ 06:26  Lying BP: --/-- HR: --  Sitting BP: 137/65 HR: 75  Standing BP: 136/82 HR: 78  Site: upper left arm  Mode: electronic  Orthostatic VS  02-25-25 @ 05:56  Lying BP: --/-- HR: --  Sitting BP: 134/64 HR: 77  Standing BP: 137/99 HR: 101  Site: --  Mode: --  
Vital Signs Last 24 Hrs  T(C): 36.9 (02-15-25 @ 06:06), Max: 36.9 (02-15-25 @ 06:06)  T(F): 98.4 (02-15-25 @ 06:06), Max: 98.4 (02-15-25 @ 06:06)  HR: --  BP: --  BP(mean): --  RR: --  SpO2: --    Orthostatic VS  02-15-25 @ 06:06  Lying BP: --/-- HR: --  Sitting BP: 142/78 HR: 77  Standing BP: 143/80 HR: 78  Site: --  Mode: --  Orthostatic VS  02-14-25 @ 08:41  Lying BP: --/-- HR: --  Sitting BP: 117/84 HR: 71  Standing BP: 132/69 HR: 73  Site: --  Mode: --  
Vital Signs Last 24 Hrs  T(C): 36.8 (03-03-25 @ 07:49), Max: 36.8 (03-03-25 @ 07:49)  T(F): 98.2 (03-03-25 @ 07:49), Max: 98.2 (03-03-25 @ 07:49)  HR: --  BP: --  BP(mean): --  RR: --  SpO2: --    Orthostatic VS  03-03-25 @ 07:49  Lying BP: --/-- HR: --  Sitting BP: 122/88 HR: 77  Standing BP: 125/75 HR: 79  Site: --  Mode: --  Orthostatic VS  03-02-25 @ 07:53  Lying BP: --/-- HR: --  Sitting BP: 132/72 HR: 68  Standing BP: 133/77 HR: 76  Site: --  Mode: --  
Vital Signs Last 24 Hrs  T(C): 36.3 (03-11-25 @ 07:48), Max: 36.3 (03-11-25 @ 07:48)  T(F): 97.4 (03-11-25 @ 07:48), Max: 97.4 (03-11-25 @ 07:48)  HR: --  BP: --  BP(mean): --  RR: --  SpO2: --    Orthostatic VS  03-11-25 @ 07:48  Lying BP: --/-- HR: --  Sitting BP: 132/62 HR: 82  Standing BP: 132/75 HR: 79  Site: --  Mode: --  Orthostatic VS  03-10-25 @ 06:39  Lying BP: --/-- HR: --  Sitting BP: 135/88 HR: 74  Standing BP: 145/86 HR: 73  Site: --  Mode: --  
Vital Signs Last 24 Hrs  T(C): --  T(F): --  HR: --  BP: --  BP(mean): --  RR: --  SpO2: --    Orthostatic VS  03-06-25 @ 10:28  Lying BP: --/-- HR: --  Sitting BP: 137/76 HR: 74  Standing BP: 148/84 HR: 79  Site: --  Mode: --  Orthostatic VS  03-05-25 @ 07:14  Lying BP: --/-- HR: --  Sitting BP: 131/66 HR: 72  Standing BP: 134/85 HR: 78  Site: --  Mode: --  
Vital Signs Last 24 Hrs  T(C): 36.8 (03-05-25 @ 07:14), Max: 36.8 (03-05-25 @ 07:14)  T(F): 98.2 (03-05-25 @ 07:14), Max: 98.2 (03-05-25 @ 07:14)  HR: --  BP: --  BP(mean): --  RR: --  SpO2: --    Orthostatic VS  03-05-25 @ 07:14  Lying BP: --/-- HR: --  Sitting BP: 131/66 HR: 72  Standing BP: 134/85 HR: 78  Site: --  Mode: --  Orthostatic VS  03-04-25 @ 06:53  Lying BP: --/-- HR: --  Sitting BP: 128/62 HR: 83  Standing BP: 131/81 HR: 83  Site: --  Mode: --

## 2025-03-12 NOTE — BH DISCHARGE NOTE NURSING/SOCIAL WORK/PSYCH REHAB - NSCDUDCCRISIS_PSY_A_CORE
.National Suicide Prevention Lifeline 3 [phone]) 269-5473/.  [employer]  1 (590) LIFENET (340-8304)/.  [facility]  [address] [place] [address] [phone]   Hours:  Monday through Friday from 9 AM to 3 PM/988 Suicide and Crisis Lifeline

## 2025-03-12 NOTE — DIETITIAN INITIAL EVALUATION ADULT - OTHER INFO
Patient is a 38 y/o female with PMHx of heart murmur, PPHx of depression, anxiety, psychosis, chronically non adherent to meds with multiple psych admissions, PTSD,  Schizophrenia, Delusional Disorder, presented to the ED BIB EMS activated by her "counselor" from "[facility]" due to concern for SA (overdosed on tablets few days ago).    Met with patient in the day room today. Patient reports her appetite has been good with good PO intake on in-house meals, denies chewing/swallowing difficulties on current diet. NKFA reported. Has no specific food preferences voiced. Denies GI distress (nausea/vomiting/diarrhea/ constipation) at this time. Current wt 144lb (2/15), 149lb (3/1). +hx of heart murmur. On DASH (cholesterol & Na restricted) diet per MD order. Provided verbal heart healthy diet education to patient today who verbalized understanding.

## 2025-03-12 NOTE — BH INPATIENT PSYCHIATRY PROGRESS NOTE - NSTXDEPRESGOAL_PSY_ALL_CORE
Exhibit improvements in self-grooming, hygiene, sleep and appetite
Exhibit improvements in self-grooming, hygiene, sleep and appetite
Will identify 2 coping skills that assist in improving mood
Exhibit improvements in self-grooming, hygiene, sleep and appetite
Will identify 2 coping skills that assist in improving mood
Exhibit improvements in self-grooming, hygiene, sleep and appetite
Will identify 2 coping skills that assist in improving mood
Exhibit improvements in self-grooming, hygiene, sleep and appetite
Exhibit improvements in self-grooming, hygiene, sleep and appetite
Will identify 2 coping skills that assist in improving mood
Exhibit improvements in self-grooming, hygiene, sleep and appetite

## 2025-03-12 NOTE — BH DISCHARGE NOTE NURSING/SOCIAL WORK/PSYCH REHAB - NSDCPRGOAL_PSY_ALL_CORE
Pt met specified psych rehab goal of identifying coping strategies for better sxs management, as pt has identified taking a walk, doing art, baking, taking a shower and taking a nap as her coping strategies. Pt reported improvements in sxs and is future oriented. Pt completed a safety plan upon discharge.

## 2025-03-12 NOTE — BH INPATIENT PSYCHIATRY PROGRESS NOTE - MSE OPTIONS
Unstructured MSE

## 2025-03-12 NOTE — BH INPATIENT PSYCHIATRY PROGRESS NOTE - NSTXDCOPNODATETRGT_PSY_ALL_CORE
18-Feb-2025
26-Feb-2025
18-Feb-2025
18-Feb-2025
06-Mar-2025
26-Feb-2025
11-Mar-2025
18-Feb-2025
26-Feb-2025
18-Feb-2025
11-Mar-2025
26-Feb-2025
06-Mar-2025
11-Mar-2025

## 2025-03-12 NOTE — BH INPATIENT PSYCHIATRY PROGRESS NOTE - NSTXSUICIDDATETRGT_PSY_ALL_CORE
18-Feb-2025
26-Feb-2025
18-Feb-2025
05-Mar-2025
26-Feb-2025
26-Feb-2025
04-Mar-2025
05-Mar-2025
18-Feb-2025
17-Mar-2025
18-Feb-2025
26-Feb-2025
18-Feb-2025
12-Mar-2025
05-Mar-2025
17-Mar-2025
18-Feb-2025
10-Mar-2025
10-Mar-2025
12-Mar-2025
12-Mar-2025

## 2025-03-12 NOTE — BH DISCHARGE NOTE NURSING/SOCIAL WORK/PSYCH REHAB - NSDCVIVACCINE_GEN_ALL_CORE_FT
Influenza, split virus, trivalent, preservative free; 15-Feb-2025 12:38; Chary Wolff (RN); [facility]; [id number] (Exp. Date: 30-Jun-2025); IntraMuscular; Deltoid Left.; 0.5 milliLiter(s); VIS (VIS Published: 06-Aug-2021, VIS Presented: 15-Feb-2025);

## 2025-03-12 NOTE — BH INPATIENT PSYCHIATRY PROGRESS NOTE - NSTXVIOLNTGOAL_PSY_ALL_CORE
Will be able to walk away from an interpersonal conflict and use a self-soothing skill
Will decrease the number/duration/intensity of angry/aggressive outbursts
Will be able to walk away from an interpersonal conflict and use a self-soothing skill
Will decrease the number/duration/intensity of angry/aggressive outbursts

## 2025-03-12 NOTE — BH INPATIENT PSYCHIATRY PROGRESS NOTE - NSICDXBHPRIMARYDX_PSY_ALL_CORE
Schizoaffective disorder, bipolar type   F25.0  

## 2025-03-12 NOTE — DIETITIAN INITIAL EVALUATION ADULT - PERTINENT LABORATORY DATA
Blood Urea Nitrogen: 6 mg/dL (02.10.25 @ 12:53)   Creatinine: 0.56 mg/dL (02.10.25 @ 12:53)   Glucose: 100 mg/dL (02.10.25 @ 12:53)     A1C with Estimated Average Glucose Result: 5.0 % (02-10-25 @ 12:53)  A1C with Estimated Average Glucose Result: 5.0 % (07-03-24 @ 06:45)    Lipid Panel: Date/Time: 02-10-25 @ 12:53  Cholesterol, Serum: 142  LDL Cholesterol Calculated: 74  HDL Cholesterol, Serum: 58  Total Cholesterol/HDL Ration Measurement: --  Triglycerides, Serum: 46

## 2025-03-12 NOTE — BH INPATIENT PSYCHIATRY PROGRESS NOTE - NSTXMEDICINTERMD_PSY_ALL_CORE
med management, psychoed, therapy, HUGHES 

## 2025-03-12 NOTE — BH INPATIENT PSYCHIATRY PROGRESS NOTE - NSTXPSYCHOGOAL_PSY_ALL_CORE
Will identify 2 coping skills that assist with focus on reality
Will identify 2 coping skills that assist with focus on reality
Will engage in a 15 minute conversation with no irrational content
Will engage in a 15 minute conversation with no irrational content
Will identify 2 coping skills that assist with focus on reality

## 2025-03-12 NOTE — BH DISCHARGE NOTE NURSING/SOCIAL WORK/PSYCH REHAB - DISCHARGE INSTRUCTIONS AFTERCARE APPOINTMENTS
In order to check the location, date, or time of your aftercare appointment, please refer to your Discharge Instructions Document given to you upon leaving the hospital.  If you have lost the instructions please call [phone]

## 2025-03-13 VITALS — TEMPERATURE: 98 F

## 2025-03-13 PROCEDURE — 99239 HOSP IP/OBS DSCHRG MGMT >30: CPT

## 2025-03-13 RX ADMIN — OLANZAPINE 5 MILLIGRAM(S): 10 TABLET ORAL at 09:27

## 2025-03-13 NOTE — BH INPATIENT PSYCHIATRY DISCHARGE NOTE - NSBHMETABOLIC_PSY_ALL_CORE_FT
BMI: BMI (kg/m2): 25.6 (03-01-25 @ 09:35)  HbA1c: A1C with Estimated Average Glucose Result: 5.0 % (02-10-25 @ 12:53)    Glucose:   BP: --Vital Signs Last 24 Hrs  T(C): 36.7 (03-13-25 @ 07:46), Max: 36.7 (03-13-25 @ 07:46)  T(F): 98 (03-13-25 @ 07:46), Max: 98 (03-13-25 @ 07:46)  HR: --  BP: --  BP(mean): --  RR: --  SpO2: --    Orthostatic VS  03-13-25 @ 07:46  Lying BP: --/-- HR: --  Sitting BP: 128/88 HR: 82  Standing BP: 134/82 HR: 84  Site: --  Mode: --  Orthostatic VS  03-12-25 @ 07:40  Lying BP: --/-- HR: --  Sitting BP: 124/88 HR: 92  Standing BP: 108/74 HR: 97  Site: --  Mode: --    Lipid Panel: Date/Time: 02-10-25 @ 12:53  Cholesterol, Serum: 142  LDL Cholesterol Calculated: 74  HDL Cholesterol, Serum: 58  Total Cholesterol/HDL Ration Measurement: --  Triglycerides, Serum: 46

## 2025-03-13 NOTE — BH INPATIENT PSYCHIATRY DISCHARGE NOTE - OTHER PAST PSYCHIATRIC HISTORY (INCLUDE DETAILS REGARDING ONSET, COURSE OF ILLNESS, INPATIENT/OUTPATIENT TREATMENT)
Pt is a 40 yo  female living, unemployed, with history of multiple diagnoses including psychotic disorder, depessive disorder, PTSD, schizophrenia, delusional disorder, MDD, has been chronically nonadherent  to medications and treatment appointments, with multiple psychiatric hospitalizations and ER visits, last admission to [facility] in 8/2024 for psychosis, with history of substance use including alcohol, cannabis, tobacco. Pt has iron deficiency anemia, other medical issues. Pt made 1 suicide attempt via overdose on pills in 2023. Pt presented to the ED bib EMS activated by her counselor [facility] due to concern for SA. Pt endorsed paranoia, vague CAH, depression and anxiety. Pt has 2 children with whom she lives, ages 20 and 16. Son is taking care of her 17 yo.

## 2025-03-13 NOTE — BH INPATIENT PSYCHIATRY DISCHARGE NOTE - NSTOBACCOMEDDSC_GEN_A_CS
PT IS NEED OF THE UPPER AND LOWER SCOPE HE HAS THE LOWER SCOPE     HE IS HAVING TROUBLE WITH SWALLOWING   AT THIS TIME AND MD HENRY DID THE LAST ONE     POSSIBLE FOR PROVIDER TO REQUEST THE DOUBLE    Prescription provided
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
## 2025-03-13 NOTE — BH INPATIENT PSYCHIATRY DISCHARGE NOTE - HOSPITAL COURSE
Suicide and risk assessment performed prior to discharge. The patient has a low acute risk and low chronic risk of self-harm and aggression towards others. Protective factors include denying SI, no SIB, denying HI, good social supports in their family, no substance abuse, no current mood symptoms, no hopelessness, future-oriented in returning to home, no access to firearms.  Risk factors include presenting illness. Immediate risk was minimized by inpatient admission to a safe environment with appropriate supervision and limited access to lethal means. Future risk was minimized before discharge by treatment of acute episode, maximizing outpatient support, providing relevant patient education, discussing emergency procedures, and ensuring close follow-up. The patient remains at a low risk of self-harm, and such risk cannot be further ameliorated by continued inpatient treatment and the patient is therefore appropriate for discharge.       There were no behavioral problems on the unit.  Patient did not become agitated and did not require emergent intramuscular medications or seclusion / restraints.  Patient did not self-harm on the unit.  Patient remained actively engaged in treatment.  Patient participated in individual, group, and milieu therapy.  Patient got along appropriately with staff and peers.   Patient did not have any medical problems during this hospitalization.  There were no medical consultations.    A full discussion of the factors that predict treatment success and relapse was held including safety planning.  A discussion of the risks and benefits of patient’s medication was held including a discussion of the metabolic risks and risk of EPS and TD was done       The patient has improved significantly and no longer requires inpatient treatment and care. Patient denies all suicidal and aggressive ideation, intent and plan. Patient denies anxiety symptoms and panic attacks. Patient is not judged to be an acute danger to self or others at this time. Patient will be discharged today to home and outpatient follow up.     Ms. Robles presented initially with severe thought disorder which strained interview.  She described her OD and the reasoning behind it but in terms that were difficult to understand.  She reported good response to Zyprexa in the past.  She was started on Zyprexa titrated to a dose of 20mg qHS and 5mg in the morning.  She reported the morning dose helped with paranoia and distress due to paranoia.  Of note, she became emotionally dysregulated, and cursed, shouted or cried talking about various women she believed her  had been romantically or sexually involved with.  With time on medication, paranoia and dysregulation remitted.  She reported no residual symptoms.  She was pleasant, euthymic, denied depressed mood and SI though she skillfully discussed grief for the recent loss of her cousin in [place].  After several weeks of not being able to get in touch with social or provider contacts, we were able to reach her [facility] counselor and also her cousin Tamika.  Tamika was supportive and was agreeable to accompanying the patient for appointments for Zyprexa RelPrevv injections.  12 days of PO overlap (in addition to 2 inpatient) of Zyprexa 20mg was provided at discharge to minimize overdose potential.  She reported no side effects to PO or HUGHES antipsychotic.  Zyprexa RelPrevv was given on 3/11 with next dose due 4/8 in [facility] injection clinic.      Suicide and risk assessment performed prior to discharge. The patient has a low acute risk and low chronic risk of self-harm and aggression towards others. Protective factors include denying SI, no SIB, denying HI, good social supports in their family, no current mood symptoms, no hopelessness, future-oriented in returning to home, no access to firearms.  Risk factors include presenting illness. Immediate risk was minimized by inpatient admission to a safe environment with appropriate supervision and limited access to lethal means. Future risk was minimized before discharge by treatment of acute episode, maximizing outpatient support, providing relevant patient education, discussing emergency procedures, and ensuring close follow-up. The patient remains at a low risk of self-harm, and such risk cannot be further ameliorated by continued inpatient treatment and the patient is therefore appropriate for discharge.       There were no behavioral problems on the unit.  Patient did not become agitated and did not require emergent intramuscular medications or seclusion / restraints.  Patient did not self-harm on the unit.  Patient remained actively engaged in treatment.  Patient participated in individual, group, and milieu therapy.  Patient got along appropriately with staff and peers.   Patient did not have any medical problems during this hospitalization.  There were no medical consultations.    A full discussion of the factors that predict treatment success and relapse was held including safety planning.  A discussion of the risks and benefits of patient’s medication was held including a discussion of the metabolic risks and risk of EPS and TD was done     The patient has improved significantly and no longer requires inpatient treatment and care. Patient denies all suicidal and aggressive ideation, intent and plan. Patient denies anxiety symptoms and panic attacks. Patient is not judged to be an acute danger to self or others at this time. Patient will be discharged today to home and outpatient follow up.

## 2025-03-13 NOTE — BH INPATIENT PSYCHIATRY DISCHARGE NOTE - NSBHFUPINTERVALHXFT_PSY_A_CORE
Discharge Progress Note Date and Time: 03-13-25 @ 10:00    Patient seen individually for discharge day management. Greater than 30 minutes was spent with patient, staff and charting as part of discharge day management. I was physically present during the service to the patient and personally examined the patient and I was directly involved in the management plan and recommendations of the care provided to the patient.      Patient seen and evaluated for follow up of psychosis and disorganization. Chart reviewed and case discussed with treatment team.  She denies SI/HI.  No agitation noted.  No somatic complaints.  Denies side effects.  She is engaged with the milieu and in groups.  Patient states she is motivated for treatment.  Informed about effects of smoking on Zyprexa levels and says she plans to continue to use gum.  States she will go to [facility] for some extended period.  We discuss needing to have a plan for getting treatment including medication or injectable medication if she is planning this.  Discusses her social supports and her duncan.  Expresses gratitude.  We discuss habits of healthy living including sleep, diet, exercise.

## 2025-03-13 NOTE — BH INPATIENT PSYCHIATRY DISCHARGE NOTE - NSDCMRMEDTOKEN_GEN_ALL_CORE_FT
melatonin 3 mg oral tablet: 1 tab(s) orally once a day (at bedtime) as needed for  insomnia  nicotine 2 mg oral transmucosal gum: 1 gum by transmucosal administration 4 times a day  OLANZapine 20 mg oral tablet: 1 tab(s) orally once a day (at bedtime)  ZyPREXA Relprevv 405 mg intramuscular injection, extended release: 405 milligram(s) intramuscularly every 28 days
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
## 2025-03-13 NOTE — BH INPATIENT PSYCHIATRY DISCHARGE NOTE - REASON FOR ADMISSION
You were admitted for sweta and psychosis after being referred to the [facility] ED by your counselor due to taking an intentional overdose of prescribed medication several days prior to coming to the hospital.

## 2025-03-13 NOTE — BH INPATIENT PSYCHIATRY DISCHARGE NOTE - DESCRIPTION
domiciled with 2 children (now ages 20 and 16 - reports that they are safe at home; 16 yr old going to school); Pt is ;  does not live with herself and their sons.  siblings: 3 brothers + 1 sister - all domiciled in [facility].  when euthymic: likes dancing, socializing, learning new things, nature/ plants.  no pets; no reported access to guns. 7th day [employer].
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
## 2025-03-13 NOTE — BH INPATIENT PSYCHIATRY DISCHARGE NOTE - NSBHSUICIDESTATUS_PSY_ALL_CORE
elevated risk of suicide chronically given recent past attempt but current euthymia and remitted psychosis, on HUGHES, mitigates risk

## 2025-03-17 NOTE — ED BEHAVIORAL HEALTH ASSESSMENT NOTE - NSBHATTESTCOMMENTATTENDFT_PSY_A_CORE
[facility]OP calling in, stated that they are trying to use dexcom g6 that they received last week. DOP stated that the meter is saying needs a new sensor. Would like to know if the transmitter or the meter might be bad and if a new one can be ordered asap since they do not have a way to check sugar.    Please advise  DOP can be reached at [phone]   See attending attestation I evaluated the patient, I reviewed her record, I discussed the case with the resident and I reviewed the above note. I made modifications above as needed and I am in agreement with assessment and plan. Pt is not acutely, manic psychotic, or depressed, she denies Si/HI, she is psychiatrically stable for discharge and there are no psychiatric C/I to discharge at this time. Pt would benefit from outpatient treatment, psychoed was provided and patient was provided with resources for treatement as  noted above.

## 2025-03-18 ENCOUNTER — OUTPATIENT (OUTPATIENT)
Dept: OUTPATIENT SERVICES | Facility: HOSPITAL | Age: 40
LOS: 1 days | Discharge: ANOTHER HOSPITAL | End: 2025-03-18

## 2025-03-20 DIAGNOSIS — F20.9 SCHIZOPHRENIA, UNSPECIFIED: ICD-10-CM

## 2025-03-24 ENCOUNTER — APPOINTMENT (OUTPATIENT)
Dept: ENDOCRINOLOGY | Facility: CLINIC | Age: 40
End: 2025-03-24

## 2025-03-31 NOTE — SOCIAL WORK POST DISCHARGE FOLLOW UP NOTE - NSBHSWFOLLOWUP_PSY_ALL_CORE_FT
SW was informed that pt did not attend her outpatient appointment. SW made several attempts to reach pt, but was unsuccessful. Pt was not considered to be a danger to herself or others by the treatment team at the time of discharge. Case closed.

## 2025-05-20 ENCOUNTER — OUTPATIENT (OUTPATIENT)
Dept: OUTPATIENT SERVICES | Facility: HOSPITAL | Age: 40
LOS: 1 days | Discharge: ANOTHER HOSPITAL | End: 2025-05-20
Payer: MEDICAID

## 2025-05-20 PROCEDURE — 99215 OFFICE O/P EST HI 40 MIN: CPT

## 2025-05-20 PROCEDURE — 90839 PSYTX CRISIS INITIAL 60 MIN: CPT

## 2025-05-21 DIAGNOSIS — F25.0 SCHIZOAFFECTIVE DISORDER, BIPOLAR TYPE: ICD-10-CM

## 2025-06-06 NOTE — BH PATIENT PROFILE - INTERNATIONAL TRAVEL
[Ankle Swelling (On Exam)] : not present [Varicose Veins Of Lower Extremities] : bilaterally [Ankle Swelling On The Left] : moderate [Abdomen Tenderness] : ~T ~M No abdominal tenderness [de-identified] : WN/WD, NC/AT [de-identified] : NC/AT [de-identified] : FROM 5/5x4 throughout, strength 5/5x4 [de-identified] : grossly intact No

## 2025-06-16 NOTE — BH SAFETY PLAN - STEP 3 DISTRACTION NAME
----- Message from Kade Erickson sent at 6/14/2025 10:05 AM EDT -----    ----- Message -----  From: Frances [facility] Results In  Sent: 6/13/2025  10:22 PM EDT  To: Kade Erickson MD     .